# Patient Record
Sex: FEMALE | Race: WHITE | NOT HISPANIC OR LATINO
[De-identification: names, ages, dates, MRNs, and addresses within clinical notes are randomized per-mention and may not be internally consistent; named-entity substitution may affect disease eponyms.]

---

## 2023-10-14 ENCOUNTER — NON-APPOINTMENT (OUTPATIENT)
Age: 83
End: 2023-10-14

## 2023-10-15 ENCOUNTER — INPATIENT (INPATIENT)
Facility: HOSPITAL | Age: 83
LOS: 3 days | Discharge: HOME HEALTH | DRG: 291 | End: 2023-10-19
Attending: HOSPITALIST | Admitting: STUDENT IN AN ORGANIZED HEALTH CARE EDUCATION/TRAINING PROGRAM
Payer: MEDICARE

## 2023-10-15 VITALS
OXYGEN SATURATION: 96 % | TEMPERATURE: 99 F | DIASTOLIC BLOOD PRESSURE: 99 MMHG | RESPIRATION RATE: 19 BRPM | WEIGHT: 160.06 LBS | SYSTOLIC BLOOD PRESSURE: 137 MMHG | HEART RATE: 93 BPM

## 2023-10-15 DIAGNOSIS — R06.02 SHORTNESS OF BREATH: ICD-10-CM

## 2023-10-15 LAB
ALBUMIN SERPL ELPH-MCNC: 4.1 G/DL — SIGNIFICANT CHANGE UP (ref 3.5–5.2)
ALP SERPL-CCNC: 54 U/L — SIGNIFICANT CHANGE UP (ref 30–115)
ALT FLD-CCNC: 29 U/L — SIGNIFICANT CHANGE UP (ref 0–41)
ANION GAP SERPL CALC-SCNC: 13 MMOL/L — SIGNIFICANT CHANGE UP (ref 7–14)
AST SERPL-CCNC: 28 U/L — SIGNIFICANT CHANGE UP (ref 0–41)
BASOPHILS # BLD AUTO: 0.04 K/UL — SIGNIFICANT CHANGE UP (ref 0–0.2)
BASOPHILS NFR BLD AUTO: 0.6 % — SIGNIFICANT CHANGE UP (ref 0–1)
BILIRUB SERPL-MCNC: 0.7 MG/DL — SIGNIFICANT CHANGE UP (ref 0.2–1.2)
BUN SERPL-MCNC: 21 MG/DL — HIGH (ref 10–20)
CALCIUM SERPL-MCNC: 9.1 MG/DL — SIGNIFICANT CHANGE UP (ref 8.4–10.4)
CHLORIDE SERPL-SCNC: 109 MMOL/L — SIGNIFICANT CHANGE UP (ref 98–110)
CO2 SERPL-SCNC: 22 MMOL/L — SIGNIFICANT CHANGE UP (ref 17–32)
CREAT SERPL-MCNC: 0.9 MG/DL — SIGNIFICANT CHANGE UP (ref 0.7–1.5)
D DIMER BLD IA.RAPID-MCNC: 474 NG/ML DDU — HIGH
EGFR: 63 ML/MIN/1.73M2 — SIGNIFICANT CHANGE UP
EOSINOPHIL # BLD AUTO: 0.04 K/UL — SIGNIFICANT CHANGE UP (ref 0–0.7)
EOSINOPHIL NFR BLD AUTO: 0.6 % — SIGNIFICANT CHANGE UP (ref 0–8)
GAS PNL BLDV: SIGNIFICANT CHANGE UP
GLUCOSE SERPL-MCNC: 98 MG/DL — SIGNIFICANT CHANGE UP (ref 70–99)
HCT VFR BLD CALC: 42.1 % — SIGNIFICANT CHANGE UP (ref 37–47)
HGB BLD-MCNC: 13.2 G/DL — SIGNIFICANT CHANGE UP (ref 12–16)
IMM GRANULOCYTES NFR BLD AUTO: 0.3 % — SIGNIFICANT CHANGE UP (ref 0.1–0.3)
LYMPHOCYTES # BLD AUTO: 1.7 K/UL — SIGNIFICANT CHANGE UP (ref 1.2–3.4)
LYMPHOCYTES # BLD AUTO: 23.5 % — SIGNIFICANT CHANGE UP (ref 20.5–51.1)
MAGNESIUM SERPL-MCNC: 2.1 MG/DL — SIGNIFICANT CHANGE UP (ref 1.8–2.4)
MCHC RBC-ENTMCNC: 28.6 PG — SIGNIFICANT CHANGE UP (ref 27–31)
MCHC RBC-ENTMCNC: 31.4 G/DL — LOW (ref 32–37)
MCV RBC AUTO: 91.3 FL — SIGNIFICANT CHANGE UP (ref 81–99)
MONOCYTES # BLD AUTO: 0.41 K/UL — SIGNIFICANT CHANGE UP (ref 0.1–0.6)
MONOCYTES NFR BLD AUTO: 5.7 % — SIGNIFICANT CHANGE UP (ref 1.7–9.3)
NEUTROPHILS # BLD AUTO: 5.03 K/UL — SIGNIFICANT CHANGE UP (ref 1.4–6.5)
NEUTROPHILS NFR BLD AUTO: 69.3 % — SIGNIFICANT CHANGE UP (ref 42.2–75.2)
NRBC # BLD: 0 /100 WBCS — SIGNIFICANT CHANGE UP (ref 0–0)
NT-PROBNP SERPL-SCNC: HIGH PG/ML (ref 0–300)
PLATELET # BLD AUTO: 266 K/UL — SIGNIFICANT CHANGE UP (ref 130–400)
PMV BLD: 9.4 FL — SIGNIFICANT CHANGE UP (ref 7.4–10.4)
POTASSIUM SERPL-MCNC: 5.1 MMOL/L — HIGH (ref 3.5–5)
POTASSIUM SERPL-SCNC: 5.1 MMOL/L — HIGH (ref 3.5–5)
PROT SERPL-MCNC: 6.7 G/DL — SIGNIFICANT CHANGE UP (ref 6–8)
RBC # BLD: 4.61 M/UL — SIGNIFICANT CHANGE UP (ref 4.2–5.4)
RBC # FLD: 14.2 % — SIGNIFICANT CHANGE UP (ref 11.5–14.5)
SODIUM SERPL-SCNC: 144 MMOL/L — SIGNIFICANT CHANGE UP (ref 135–146)
TROPONIN T SERPL-MCNC: 0.02 NG/ML — HIGH
WBC # BLD: 7.24 K/UL — SIGNIFICANT CHANGE UP (ref 4.8–10.8)
WBC # FLD AUTO: 7.24 K/UL — SIGNIFICANT CHANGE UP (ref 4.8–10.8)

## 2023-10-15 PROCEDURE — 85025 COMPLETE CBC W/AUTO DIFF WBC: CPT

## 2023-10-15 PROCEDURE — 97166 OT EVAL MOD COMPLEX 45 MIN: CPT | Mod: GO

## 2023-10-15 PROCEDURE — 71045 X-RAY EXAM CHEST 1 VIEW: CPT | Mod: 26

## 2023-10-15 PROCEDURE — 97162 PT EVAL MOD COMPLEX 30 MIN: CPT | Mod: GP

## 2023-10-15 PROCEDURE — 97116 GAIT TRAINING THERAPY: CPT | Mod: GP

## 2023-10-15 PROCEDURE — 36415 COLL VENOUS BLD VENIPUNCTURE: CPT

## 2023-10-15 PROCEDURE — 97110 THERAPEUTIC EXERCISES: CPT | Mod: GP

## 2023-10-15 PROCEDURE — 80053 COMPREHEN METABOLIC PANEL: CPT

## 2023-10-15 PROCEDURE — 93970 EXTREMITY STUDY: CPT | Mod: 26

## 2023-10-15 PROCEDURE — 80061 LIPID PANEL: CPT

## 2023-10-15 PROCEDURE — 99223 1ST HOSP IP/OBS HIGH 75: CPT

## 2023-10-15 PROCEDURE — 93005 ELECTROCARDIOGRAM TRACING: CPT

## 2023-10-15 PROCEDURE — 99285 EMERGENCY DEPT VISIT HI MDM: CPT

## 2023-10-15 PROCEDURE — 84436 ASSAY OF TOTAL THYROXINE: CPT

## 2023-10-15 PROCEDURE — 72125 CT NECK SPINE W/O DYE: CPT | Mod: 26,MA

## 2023-10-15 PROCEDURE — 82962 GLUCOSE BLOOD TEST: CPT

## 2023-10-15 PROCEDURE — 70450 CT HEAD/BRAIN W/O DYE: CPT | Mod: 26,MA

## 2023-10-15 PROCEDURE — 73620 X-RAY EXAM OF FOOT: CPT | Mod: 26,LT

## 2023-10-15 PROCEDURE — 83735 ASSAY OF MAGNESIUM: CPT

## 2023-10-15 PROCEDURE — 84443 ASSAY THYROID STIM HORMONE: CPT

## 2023-10-15 PROCEDURE — 83880 ASSAY OF NATRIURETIC PEPTIDE: CPT

## 2023-10-15 PROCEDURE — 85027 COMPLETE CBC AUTOMATED: CPT

## 2023-10-15 PROCEDURE — 80048 BASIC METABOLIC PNL TOTAL CA: CPT

## 2023-10-15 PROCEDURE — 83036 HEMOGLOBIN GLYCOSYLATED A1C: CPT

## 2023-10-15 PROCEDURE — 73600 X-RAY EXAM OF ANKLE: CPT | Mod: 26,LT

## 2023-10-15 PROCEDURE — 71045 X-RAY EXAM CHEST 1 VIEW: CPT

## 2023-10-15 PROCEDURE — 70486 CT MAXILLOFACIAL W/O DYE: CPT | Mod: 26,MA

## 2023-10-15 PROCEDURE — 71275 CT ANGIOGRAPHY CHEST: CPT | Mod: 26,MA

## 2023-10-15 PROCEDURE — 73590 X-RAY EXAM OF LOWER LEG: CPT | Mod: 26,LT

## 2023-10-15 PROCEDURE — 84484 ASSAY OF TROPONIN QUANT: CPT

## 2023-10-15 RX ORDER — DIAZEPAM 5 MG
5 TABLET ORAL AT BEDTIME
Refills: 0 | Status: DISCONTINUED | OUTPATIENT
Start: 2023-10-15 | End: 2023-10-19

## 2023-10-15 RX ORDER — CARVEDILOL PHOSPHATE 80 MG/1
6.25 CAPSULE, EXTENDED RELEASE ORAL EVERY 12 HOURS
Refills: 0 | Status: DISCONTINUED | OUTPATIENT
Start: 2023-10-15 | End: 2023-10-19

## 2023-10-15 RX ORDER — ENOXAPARIN SODIUM 100 MG/ML
40 INJECTION SUBCUTANEOUS EVERY 24 HOURS
Refills: 0 | Status: DISCONTINUED | OUTPATIENT
Start: 2023-10-15 | End: 2023-10-19

## 2023-10-15 RX ORDER — FUROSEMIDE 40 MG
40 TABLET ORAL ONCE
Refills: 0 | Status: COMPLETED | OUTPATIENT
Start: 2023-10-15 | End: 2023-10-15

## 2023-10-15 RX ORDER — DEXAMETHASONE 0.5 MG/5ML
6 ELIXIR ORAL ONCE
Refills: 0 | Status: COMPLETED | OUTPATIENT
Start: 2023-10-15 | End: 2023-10-15

## 2023-10-15 RX ORDER — ONDANSETRON 8 MG/1
4 TABLET, FILM COATED ORAL EVERY 8 HOURS
Refills: 0 | Status: DISCONTINUED | OUTPATIENT
Start: 2023-10-15 | End: 2023-10-19

## 2023-10-15 RX ORDER — ATORVASTATIN CALCIUM 80 MG/1
40 TABLET, FILM COATED ORAL AT BEDTIME
Refills: 0 | Status: DISCONTINUED | OUTPATIENT
Start: 2023-10-15 | End: 2023-10-19

## 2023-10-15 RX ORDER — ACETAMINOPHEN 500 MG
650 TABLET ORAL EVERY 6 HOURS
Refills: 0 | Status: DISCONTINUED | OUTPATIENT
Start: 2023-10-15 | End: 2023-10-19

## 2023-10-15 RX ORDER — IPRATROPIUM/ALBUTEROL SULFATE 18-103MCG
3 AEROSOL WITH ADAPTER (GRAM) INHALATION ONCE
Refills: 0 | Status: COMPLETED | OUTPATIENT
Start: 2023-10-15 | End: 2023-10-15

## 2023-10-15 RX ORDER — FUROSEMIDE 40 MG
40 TABLET ORAL EVERY 12 HOURS
Refills: 0 | Status: DISCONTINUED | OUTPATIENT
Start: 2023-10-16 | End: 2023-10-16

## 2023-10-15 RX ADMIN — Medication 40 MILLIGRAM(S): at 14:51

## 2023-10-15 RX ADMIN — Medication 6 MILLIGRAM(S): at 14:51

## 2023-10-15 RX ADMIN — Medication 40 MILLIGRAM(S): at 18:04

## 2023-10-15 RX ADMIN — Medication 3 MILLILITER(S): at 14:51

## 2023-10-15 NOTE — H&P ADULT - HISTORY OF PRESENT ILLNESS
83F w/ PMHx HTN, HLD, and CHF presents to the ED with shortness of breath. Patient states that she has been visiting her family in [place] since Wednesday, but forgot all of her medications at her home in [place]. She has been without her carvedilol, lisinopril, and Lasix 40mg which she takes daily. She went to an urgent care this morning was noted to be short of breath and hypoxic in the mid 80s without any oxygen.  Paramedics noted that she was in paroxysmal A-fib on the EKG and brought her to the ED. At this time she remains with shortness of breath on minimal exertion however 100% O2 on 2L NC. She admits to some lightheadedness, dry cough and leg swelling but denies palpitations, chest pain, fever, chills, sore throat, abdominal pain, nausea, vomiting, dysuria. Of note 2 weeks ago she experienced a mechanical trip and fall with head strike and has had some pain around her nose and left leg since. Patient is not on AC.    Vitals:    Labs:    Imaging:    Admitted to medicine for further management 83F w/ PMHx HTN, HLD, and CHF presents to the ED with shortness of breath. Patient states that she has been visiting her family in [place] since Wednesday, but forgot all of her medications at her home in [place]. She has been without her carvedilol, lisinopril, and Lasix 40mg which she takes daily. She went to an urgent care this morning was noted to be short of breath and hypoxic in the mid 80s without any oxygen.  Paramedics noted that she was in paroxysmal A-fib on the EKG and brought her to the ED. At this time she remains with shortness of breath on minimal exertion however 100% O2 on 2L NC. She admits to some lightheadedness, dry cough and leg swelling but denies palpitations, chest pain, fever, chills, sore throat, abdominal pain, nausea, vomiting, dysuria. Of note 2 weeks ago she experienced a mechanical trip and fall with head strike and has had some pain around her nose and left leg since. Patient is not on AC.    Vitals: Temp 99F, /99, HR 93, RR 19, SpO2 96% on 3L NC    Labs: K 5.1, Trop 0.02, BNP 51665 (unknown baseline), D-dimer 474    Imaging:  - CT Head/Maxillofacial/Neck: Bilateral nasal bone fractures are noted with displacement and deviation to the right, otherwise no other acute intracranial pathology.  - CTA Chest: No PE noted. Small bilateral pleural effusions with compressive atelectasis. No consolidations. Reflux of contrast into the inferior vena cava and hepatic veins. Small bilateral pleural effusions and mild mosaic lung attenuation. Findings are compatible with right heart failure.    EKG showed sinus rhythm w/ PACs and PVCs    In the ED:  - s/p Lasix 40mg IV x2  - Duonebs x1  - Decadron 6mg IV x1    Admitted to medicine/telemetry for further management 83F w/ PMHx CHF, HTN, HLD and remote smoking hx presents to the ED with shortness of breath. Patient is visiting family from [place] and forgot her medications for the past few days. Prior to the ED, she was at urgent care where she was found to be hypoxic in the mid 80s on RA. Patient is not on home O2. She admits to some lightheadedness, dry cough, leg swelling, left leg pain and nose pain, but denies palpitations, chest pain, headaches, fever, chills, sore throat, abdominal pain, nausea, vomiting, diarrhea or dysuria. Of note, patient's daughter passed away 2 weeks ago and is undergoing a lot of emotional stress. She also had a mechanical fall a few days ago with head and LLE trauma.    Vitals: Temp 99F, /99, HR 93, RR 19, SpO2 96% on 3L NC    Labs: K 5.1, Trop 0.02, BNP 79522 (unknown baseline), D-dimer 474    Imaging:  - CT Head/Maxillofacial/Neck: Bilateral nasal bone fractures are noted with displacement and deviation to the right, otherwise no other acute intracranial pathology.  - CTA Chest: No PE noted. Small bilateral pleural effusions with compressive atelectasis. No consolidations. Reflux of contrast into the inferior vena cava and hepatic veins. Small bilateral pleural effusions and mild mosaic lung attenuation. Findings are compatible with right heart failure.    EKG showed sinus rhythm w/ PACs and PVCs    In the ED:  - s/p Lasix 40mg IV x2  - Duonebs x1  - Decadron 6mg IV x1    Admitted to medicine/telemetry for further management

## 2023-10-15 NOTE — H&P ADULT - NSHPPHYSICALEXAM_GEN_ALL_CORE
VITALS:   Vital Signs Last 24 Hrs  T(C): 36.6 (15 Oct 2023 15:59), Max: 37.2 (15 Oct 2023 11:19)  T(F): 97.9 (15 Oct 2023 15:59), Max: 99 (15 Oct 2023 11:19)  HR: 63 (15 Oct 2023 15:59) (63 - 93)  BP: 127/92 (15 Oct 2023 18:02) (127/92 - 137/99)  RR: 19 (15 Oct 2023 11:19) (19 - 19)  SpO2: 99% (15 Oct 2023 15:59) (96% - 99%)    Parameters below as of 15 Oct 2023 15:59  Patient On (Oxygen Delivery Method): nasal cannula      I&O's Summary    CAPILLARY BLOOD GLUCOSE          PHYSICAL EXAM:  General: WN/WD NAD  HEENT: PERRLA, EOMI, moist mucous membranes  Neurology: A&Ox3, nonfocal, KING x 4  Respiratory: CTA B/L, normal respiratory effort, no wheezes, crackles, rales  CV: RRR, S1S2, no murmurs, rubs or gallops  Abdominal: Soft, NT, ND +BS, Last BM  Extremities: No edema, + peripheral pulses VITALS:   Vital Signs Last 24 Hrs  T(C): 36.6 (15 Oct 2023 15:59), Max: 37.2 (15 Oct 2023 11:19)  T(F): 97.9 (15 Oct 2023 15:59), Max: 99 (15 Oct 2023 11:19)  HR: 63 (15 Oct 2023 15:59) (63 - 93)  BP: 127/92 (15 Oct 2023 18:02) (127/92 - 137/99)  RR: 19 (15 Oct 2023 11:19) (19 - 19)  SpO2: 99% (15 Oct 2023 15:59) (96% - 99%)    Parameters below as of 15 Oct 2023 15:59  Patient On (Oxygen Delivery Method): nasal cannula      I&O's Summary    CAPILLARY BLOOD GLUCOSE          PHYSICAL EXAM:  General: in NAD  HEENT: PERRLA, EOMI, moist mucous membranes, tenderness to palpation w/ slight deviation of nasal bone  Neurology: A&Ox3  Respiratory: CTA B/L, normal respiratory effort, no wheezes, crackles, rales  CV: tachycardic, irregular pulse, +S3  Abdominal: Soft, NT, ND, +BS, no guarding or rebound tenderness  Extremities: +1 edema in b/l LEs, + peripheral pulses, slight bruising of LLE

## 2023-10-15 NOTE — ED PROVIDER NOTE - CLINICAL SUMMARY MEDICAL DECISION MAKING FREE TEXT BOX
83-year-old female past medical history of hypertension hyperlipidemia CHF presents with multiple complaints.  Sent from urgent care for hypoxia, [facility] told him that patient had new onset A-fib in route to ED.  Patient reports 1 week of chest tightness shortness of breath generalized weakness dry cough.  Patient states she missed the last 5 days of Lasix because she was staying with her daughter.  Not on home O2.  No fever.  Has leg swelling associated.  Patient states she fell 2 weeks ago missed that step and hit her face.  No LOC or blood thinners.  States her nose was broken before and looks more crooked now.  No numbness weakness blurry vision altered mental status slurred speech.  No abdominal pain nausea vomiting diarrhea.  No other injuries from fall.  No neck pain back pain.    On exam, AFVSS, Well appearing, nasal septum deviation, no septal hematoma, abrasions to face healed no sign of infection, no acute distress, NCAT, EOMI, PERRLA, MMM, Neck supple, bibasilar crackles, RRR nl s1s2 No mrg, Abdomen Soft NTND, AAOx3, No Focal Deficits, 1+ bilateral pitting LE edema , no calf TTP,    A/P; mechanical fall 2 weeks ago head injury we will get CT head rule out fractures, intracranial bleed, shortness of breath chest pain appears overloaded, rule out ACS, rule out PE, rule out pneumonia, will do labs EKG chest x-ray troponin BNP D-dimer continue O2 nasal cannula, Lasix as needed, needs admission as patient is hypoxic

## 2023-10-15 NOTE — ED PROVIDER NOTE - ATTENDING APP SHARED VISIT CONTRIBUTION OF CARE
83-year-old female past medical history of hypertension hyperlipidemia CHF presents with multiple complaints.  Sent from urgent care for hypoxia, [facility] told him that patient had new onset A-fib in route to ED.  Patient reports 1 week of chest tightness shortness of breath generalized weakness dry cough.  Patient states she missed the last 5 days of Lasix because she was staying with her daughter.  Not on home O2.  No fever.  Has leg swelling associated.  Patient states she fell 2 weeks ago missed that step and hit her face.  No LOC or blood thinners.  States her nose was broken before and looks more crooked now.  No numbness weakness blurry vision altered mental status slurred speech.  No abdominal pain nausea vomiting diarrhea.  No other injuries from fall.  No neck pain back pain.    On exam, AFVSS, Well appearing, nasal septum deviation, no septal hematoma, abrasions to face healed no sign of infection, no acute distress, NCAT, EOMI, PERRLA, MMM, Neck supple, bibasilar crackles, RRR nl s1s2 No mrg, Abdomen Soft NTND, AAOx3, No Focal Deficits, 1+ bilateral pitting LE edema , no calf TTP,    A/P; mechanical fall 2 weeks ago head injury we will get CT head rule out fractures, intracranial bleed, shortness of breath chest pain appears overloaded, rule out ACS, rule out PE, rule out pneumonia, will do labs EKG chest x-ray troponin BNP D-dimer continue O2 nasal cannula, Lasix as needed, needs admission as patient is hypoxic

## 2023-10-15 NOTE — H&P ADULT - NSHPLABSRESULTS_GEN_ALL_CORE
LABS:  cret                        13.2   7.24  )-----------( 266      ( 15 Oct 2023 14:35 )             42.1     10-15    144  |  109  |  21<H>  ----------------------------<  98  5.1<H>   |  22  |  0.9    Ca    9.1      15 Oct 2023 14:35  Mg     2.1     10-15    TPro  6.7  /  Alb  4.1  /  TBili  0.7  /  DBili  x   /  AST  28  /  ALT  29  /  AlkPhos  54  10-15

## 2023-10-15 NOTE — H&P ADULT - ASSESSMENT
83F w/ PMHx HTN, HLD, and CHF presents to the ED with shortness of breath.    #CHF    #HTN    #HLD    #DVT ppx:  #GI ppx:  #Diet:  #Activity:  #Code Status:  #Dispo: 83F w/ PMHx HTN, HLD, and CHF presents to the ED with shortness of breath.    #CHF  - CTA Chest shows small b/l pleural effusions, evidence of right heart failure, no consolidations  - CXR shows vascular congestion and small b/l effusions  - EKG in ED showed Sinus Rhythm w/ PVC and PACs (according to EMS, their EKG showed AFib, pt has no hx of Afib)  - satting 96% on 3L NC  - BNP 95537 (unknown baseline)  - Trop elevated at 0.02  - takes Lasix 40mg PO qD at home, has not taken meds for the last few days  - s/p Lasix 40mg IV x2 in the ED  - trend trop until stable or downtrending  - f/u TTE  - f/u Cardio consult  - monitor tele    #Hyperkalemia  - K 5.1 on admission  -     #Nasal Bone Fractures s/p Mechanical Fall  - CT Maxillofacial shows bilateral nasal bone fractures are noted with displacement and deviation to the right  - CT Head -ve for acute intracranial pathology    #Left Leg Trauma s/p Mechanical Fall  - f/u Xray of left foot, ankle and tib/fib    #HTN  -     #HLD  -     #DVT ppx:  #GI ppx:  #Diet:  #Activity:  #Code Status:  #Dispo: from home 83F w/ PMHx HTN, HLD, and CHF presents to the ED with shortness of breath.    #Acute on Chronic CHF Exacerbation  - CTA Chest shows small b/l pleural effusions, evidence of right heart failure, no consolidations  - CXR shows vascular congestion and small b/l effusions  - EKG in ED showed Sinus Rhythm w/ PVC and PACs  - satting 96% on 3L NC  - BNP 15679 (unknown baseline)  - Trop elevated at 0.02  - takes Lasix 40mg PO qD at home, has not taken meds for the last few days  - s/p Lasix 40mg IV x2 in the ED  - c/w Lasix 40mg IV BID for now  - trend trop until stable or downtrending  - f/u TSH, A1C  - f/u TTE  - f/u Cardio consult  - strict I&Os, daily weight, 1.5 fluid restriction  - monitor Cr as pt is being actively diuresed and received IV contrast  - monitor tele    #Hyperkalemia  - K 5.1 on admission  - actively diuresing  - monitor lytes and correct PRN    #Nasal Bone Fractures s/p Mechanical Fall  #h/o Facial Trauma s/p Mechanical Fall (~2019)  - CT Maxillofacial shows bilateral nasal bone fractures are noted with displacement and deviation to the right  - CT Head -ve for acute intracranial pathology  - f/u ENT consult    #Left Leg Trauma s/p Mechanical Fall  - f/u Xray of left foot, ankle and tib/fib    #HTN  - holding home Lisinopril 10mg daily d/t hyperkalemia  - monitor BP  - resume home BP meds when appropriate    #HLD  - c/w home Atorvastatin 40mg daily  - f/u lipid panel    #DVT ppx: Heparin SubQ  #GI ppx: n/a  #Diet: regular - DASH/TLC w/ 1.5L fluid restriction  #Activity: IAT, fall risk  #Code Status: Full Code  #Dispo: from home 83F w/ PMHx HTN, HLD, and CHF presents to the ED with shortness of breath.    #Acute on Chronic CHF Exacerbation  - CTA Chest shows small b/l pleural effusions, evidence of right heart failure, no consolidations  - CXR shows vascular congestion and small b/l effusions  - EKG in ED showed Sinus Rhythm w/ PVC and PACs  - satting 96% on 3L NC  - BNP 07329 (unknown baseline)  - Trop elevated at 0.02  - takes Lasix 40mg PO qD at home, has not taken meds for the last few days  - s/p Lasix 40mg IV x2 in the ED  - c/w Lasix 40mg IV BID for now  - trend trop until stable or downtrending  - f/u TSH, A1C  - f/u TTE  - f/u Cardio consult  - strict I&Os, daily weight, 1.5 fluid restriction  - monitor Cr as pt is being actively diuresed and received IV contrast  - monitor tele    #Hyperkalemia  - K 5.1 on admission  - actively diuresing  - monitor lytes and correct PRN    #Nasal Bone Fractures s/p Mechanical Fall  #h/o Facial Trauma s/p Mechanical Fall (~2019)  - CT Maxillofacial shows bilateral nasal bone fractures are noted with displacement and deviation to the right  - CT Head -ve for acute intracranial pathology  - f/u ENT consult    #Left Leg Trauma s/p Mechanical Fall  - f/u Xray of left foot, ankle and tib/fib    #HTN  - holding home Lisinopril 10mg daily d/t hyperkalemia  - monitor BP  - resume home BP meds when appropriate    #HLD  - c/w home Atorvastatin 40mg daily  - f/u lipid panel    #DVT ppx: Lovenox SubQ  #GI ppx: n/a  #Diet: regular - DASH/TLC w/ 1.5L fluid restriction  #Activity: IAT, fall risk  #Code Status: Full Code  #Dispo: from home 83F w/ PMHx HTN, HLD, and CHF presents to the ED with shortness of breath.    #Acute on Chronic CHF Exacerbation  - CTA Chest shows small b/l pleural effusions, evidence of right heart failure, no consolidations  - CXR shows vascular congestion and small b/l effusions  - EKG in ED showed Sinus Rhythm w/ PVC and PACs  - satting 96% on 3L NC  - BNP 02993 (unknown baseline)  - Trop elevated at 0.02  - takes Lasix 40mg PO qD at home, has not taken meds for the last few days  - s/p Lasix 40mg IV x2 in the ED  - c/w Lasix 40mg IV BID for now  - trend trop until stable or downtrending  - f/u TSH, A1C  - f/u TTE  - f/u Cardio consult  - strict I&Os, daily weight, 1.5 fluid restriction  - monitor Cr as pt is being actively diuresed and received IV contrast  - monitor tele    #Hyperkalemia  - K 5.1 on admission  - actively diuresing  - monitor lytes and correct PRN    #Nasal Bone Fractures s/p Mechanical Fall  #h/o Facial Trauma s/p Mechanical Fall (~2019)  - CT Maxillofacial shows bilateral nasal bone fractures are noted with displacement and deviation to the right  - CT Head -ve for acute intracranial pathology  - f/u ENT consult    #Left Leg Trauma s/p Mechanical Fall  - f/u Xray of left foot, ankle and tib/fib    #HTN  - holding home Lisinopril 10mg daily d/t hyperkalemia  - monitor BP  - resume home BP meds when appropriate    #HLD  - c/w home Atorvastatin 40mg daily  - f/u lipid panel    #DVT ppx: Lovenox SubQ  #GI ppx: n/a  #Diet: regular - DASH/TLC w/ 1.5L fluid restriction  #Activity: IAT, fall risk - f/u PT/OT consult  #Code Status: Full Code  #Dispo: from home

## 2023-10-15 NOTE — ED PROVIDER NOTE - OBJECTIVE STATEMENT
83 year old female with a history of 83 year old female with a history of HTN, HLD, and CHF presents to the ED with shortness of breath. Patient states that she has been visiting her family in [place] since Wednesday but forgot all of her medications at her home in [place].  She has been without her carvedilol, lisinopril, and Lasix 40 mg which she takes daily.  She went to an urgent care this morning was noted to be short of breath and hypoxic in the mid 80s without any oxygen.  Paramedics noted that she was in paroxysmal A-fib on the EKG and brought her to the ED.  At this time she remains with shortness of breath on minimal exertion however 100% O2 on 2L NC.  She admits to some lightheadedness, dry cough and leg swelling but denies palpitations, chest pain, fever, chills, sore throat, abdominal pain, nausea, vomiting, dysuria.  Of note 2 weeks ago she experienced a mechanical trip and fall with head strike and has had some pain around her nose and left leg since. Patient is not on AC.

## 2023-10-15 NOTE — ED PROVIDER NOTE - CARE PLAN
Principal Discharge DX:	Acute CHF  Secondary Diagnosis:	Shortness of breath  Secondary Diagnosis:	Cardiac arrhythmia   1

## 2023-10-15 NOTE — ED PROVIDER NOTE - PHYSICAL EXAMINATION
Physical Exam    Constitutional: No acute distress.   Eyes: Conjunctiva pink, Sclera clear, PERRLA, EOMI.  ENT: Nasal bone tenderness with some deviation. No nasal discharge. No oropharyngeal erythema, edema, or exudates. Uvula midline.   Cardiovascular: irregular rhythm, regular rate. No noted murmur.   Respiratory: unlabored respiratory effort, 2L NC. Bilateral crackles  Gastrointestinal: Normal bowel sounds. soft, non distended, non-tender abdomen.   Musculoskeletal: supple neck, no midline tenderness. No joint or bony deformity. Bilateral lower extremity edema  Integumentary: warm, dry, no rash  Neurologic: awake, alert, cranial nerves II-XII grossly intact, extremities’ motor and sensory functions grossly intact  Psychiatric: appropriate mood, appropriate affect

## 2023-10-15 NOTE — H&P ADULT - ATTENDING COMMENTS
Patient seen and examined at bedside independently of the residents. I read the resident's note and agree with the plan with the additions and corrections as noted below. My note supersedes the resident's note.     REVIEW OF SYSTEMS:  CONSTITUTIONAL: No weakness, fevers or chills  EYES/ENT: No visual changes;  No vertigo or throat pain   NECK: No pain or stiffness  RESPIRATORY: No cough, wheezing, hemoptysis; + shortness of breath  CARDIOVASCULAR: No chest pain or palpitations  GASTROINTESTINAL: No abdominal or epigastric pain. No nausea, vomiting, or hematemesis; No diarrhea or constipation. No melena or hematochezia.  GENITOURINARY: No dysuria, frequency or hematuria  NEUROLOGICAL: No numbness or weakness  MSK: No pain. No weakness.   SKIN: No itching, rashes.     PMH: HTN, HLD, CHF     FHx: Reviewed. No fhx of asthma/copd, No fhx of liver and pulmonary disease. No fhx of hematological disorder.     Physical Exam:  GEN: No acute distress. Awake, Alert and oriented x 3.   Head: Atraumatic, Normocephalic.   Eye: PEERLA. No sclera icterus. EOMI.   ENT: Normal oropharynx, no thyromegaly, no mass, no lymphadenopathy.   LUNGS: Crackles b/l lung fields.   HEART: Normal. S1/S2 present. RRR. No murmur/gallops.   ABD: Soft, non-tender, non-distended. Bowel sounds present.   EXT: 1+ pitting edema B/L LE . No erythema. No tenderness.  Integumentary: No rash, No sore, No petechia.   NEURO: CN III-XII intact. Strength: 5/5 b/l ULE. Sensory intact b/l ULE.     Vital Signs Last 24 Hrs  T(C): 36.6 (15 Oct 2023 15:59), Max: 37.2 (15 Oct 2023 11:19)  T(F): 97.9 (15 Oct 2023 15:59), Max: 99 (15 Oct 2023 11:19)  HR: 63 (15 Oct 2023 15:59) (63 - 93)  BP: 127/92 (15 Oct 2023 18:02) (127/92 - 137/99)  BP(mean): --  RR: 19 (15 Oct 2023 11:19) (19 - 19)  SpO2: 99% (15 Oct 2023 15:59) (96% - 99%)    Parameters below as of 15 Oct 2023 15:59  Patient On (Oxygen Delivery Method): nasal cannula      Please see the above notes for Labs and radiology.     Assessment and Plan:     82 yo F with hx of HTN, HLD, CHF presents to ED for SOB.     Acute hypoxic respiratory failure likely 2/2 acute CHF exacerbation 2/2 medication non-compliance   - CTA chest: neg for acute PE. Reflux of contrast into the inferior vena cava and hepatic veins. Small bilateral pleural effusions and mild mosaic lung attenuation. - CXR shows b/l pulmonary vascular congestion to my read --> pending official report.   - EKG: Sinus rhythm with PVC and PAC. Prolong QTc 487.   - proBNP 90032  - 1st troponin 0.02 --> trend troponin.   - s/p IV lasix 40mg x 2 in ED.   - c/w IV lasix 40mg BID (pt takes PO lasix 4mg qd at home)   - check TTE and TSH  - monitor daily weight, strict I & O, Low Na diet with fluid restriction  - monitor on Telemetry.     HTN/HLD - c/w home med    DVT ppx: Lovenox SC  GI ppx: not indicated.   Diet: DASH diet with fluid restriction  Activity: as tolerated.     Date seen by the attending: 10/15/2023  Total time spent: 75 minutes. Patient seen and examined at bedside independently of the residents. I read the resident's note and agree with the plan with the additions and corrections as noted below. My note supersedes the resident's note.     REVIEW OF SYSTEMS:  CONSTITUTIONAL: No weakness, fevers or chills  EYES/ENT: No visual changes;  No vertigo or throat pain   NECK: No pain or stiffness  RESPIRATORY: No cough, wheezing, hemoptysis; + shortness of breath  CARDIOVASCULAR: No chest pain or palpitations  GASTROINTESTINAL: No abdominal or epigastric pain. No nausea, vomiting, or hematemesis; No diarrhea or constipation. No melena or hematochezia.  GENITOURINARY: No dysuria, frequency or hematuria  NEUROLOGICAL: No numbness or weakness  MSK: No pain. No weakness.   SKIN: No itching, rashes.     PMH: HTN, HLD, CHF     FHx: Reviewed. No fhx of asthma/copd, No fhx of liver and pulmonary disease. No fhx of hematological disorder.     Physical Exam:  GEN: No acute distress. Awake, Alert and oriented x 3.   Head: Atraumatic, Normocephalic.   Eye: PEERLA. No sclera icterus. EOMI.   ENT: Normal oropharynx, no thyromegaly, no mass, no lymphadenopathy.   LUNGS: Crackles b/l lung fields.   HEART: Normal. S1/S2 present. RRR. No murmur/gallops.   ABD: Soft, non-tender, non-distended. Bowel sounds present.   EXT: 1+ pitting edema B/L LE . No erythema. No tenderness.  Integumentary: No rash, No sore, No petechia.   NEURO: CN III-XII intact. Strength: 5/5 b/l ULE. Sensory intact b/l ULE.     Vital Signs Last 24 Hrs  T(C): 36.6 (15 Oct 2023 15:59), Max: 37.2 (15 Oct 2023 11:19)  T(F): 97.9 (15 Oct 2023 15:59), Max: 99 (15 Oct 2023 11:19)  HR: 63 (15 Oct 2023 15:59) (63 - 93)  BP: 127/92 (15 Oct 2023 18:02) (127/92 - 137/99)  BP(mean): --  RR: 19 (15 Oct 2023 11:19) (19 - 19)  SpO2: 99% (15 Oct 2023 15:59) (96% - 99%)    Parameters below as of 15 Oct 2023 15:59  Patient On (Oxygen Delivery Method): nasal cannula      Please see the above notes for Labs and radiology.     Assessment and Plan:     82 yo F with hx of HTN, HLD, CHF presents to ED for SOB.     Acute hypoxic respiratory failure likely 2/2 acute CHF exacerbation 2/2 medication non-compliance   - CTA chest: neg for acute PE. Reflux of contrast into the inferior vena cava and hepatic veins. Small bilateral pleural effusions and mild mosaic lung attenuation. - CXR shows b/l pulmonary vascular congestion to my read --> pending official report.   - EKG: Sinus rhythm with PVC and PAC. Prolong QTc 487.   - proBNP 46655  - 1st troponin 0.02 --> trend troponin.   - s/p IV lasix 40mg x 2 in ED.   - c/w IV lasix 40mg BID (pt takes PO lasix 4mg qd at home)   - check TTE and TSH  - monitor daily weight, strict I & O, Low Na diet with fluid restriction  - monitor on Telemetry.     Mild hyperkalemia   - c/w IV lasix   - repeat BMP     Nasal bone fracture 2/2 mechanical fall   - pain control prn  - ENT consult.     HTN/HLD - c/w home med    DVT ppx: Lovenox SC  GI ppx: not indicated.   Diet: DASH diet with fluid restriction  Activity: as tolerated.     Date seen by the attending: 10/15/2023  Total time spent: 75 minutes.

## 2023-10-16 LAB
A1C WITH ESTIMATED AVERAGE GLUCOSE RESULT: 5.8 % — HIGH (ref 4–5.6)
ALBUMIN SERPL ELPH-MCNC: 4.2 G/DL — SIGNIFICANT CHANGE UP (ref 3.5–5.2)
ALP SERPL-CCNC: 50 U/L — SIGNIFICANT CHANGE UP (ref 30–115)
ALT FLD-CCNC: 24 U/L — SIGNIFICANT CHANGE UP (ref 0–41)
ANION GAP SERPL CALC-SCNC: 15 MMOL/L — HIGH (ref 7–14)
AST SERPL-CCNC: 20 U/L — SIGNIFICANT CHANGE UP (ref 0–41)
BASOPHILS # BLD AUTO: 0.05 K/UL — SIGNIFICANT CHANGE UP (ref 0–0.2)
BASOPHILS NFR BLD AUTO: 0.7 % — SIGNIFICANT CHANGE UP (ref 0–1)
BILIRUB SERPL-MCNC: 1.1 MG/DL — SIGNIFICANT CHANGE UP (ref 0.2–1.2)
BUN SERPL-MCNC: 21 MG/DL — HIGH (ref 10–20)
CALCIUM SERPL-MCNC: 8.7 MG/DL — SIGNIFICANT CHANGE UP (ref 8.4–10.5)
CHLORIDE SERPL-SCNC: 100 MMOL/L — SIGNIFICANT CHANGE UP (ref 98–110)
CHOLEST SERPL-MCNC: 223 MG/DL — HIGH
CO2 SERPL-SCNC: 27 MMOL/L — SIGNIFICANT CHANGE UP (ref 17–32)
CREAT SERPL-MCNC: 1.1 MG/DL — SIGNIFICANT CHANGE UP (ref 0.7–1.5)
EGFR: 49 ML/MIN/1.73M2 — LOW
EOSINOPHIL # BLD AUTO: 0.1 K/UL — SIGNIFICANT CHANGE UP (ref 0–0.7)
EOSINOPHIL NFR BLD AUTO: 1.4 % — SIGNIFICANT CHANGE UP (ref 0–8)
ESTIMATED AVERAGE GLUCOSE: 120 MG/DL — HIGH (ref 68–114)
GLUCOSE BLDC GLUCOMTR-MCNC: 102 MG/DL — HIGH (ref 70–99)
GLUCOSE BLDC GLUCOMTR-MCNC: 102 MG/DL — HIGH (ref 70–99)
GLUCOSE BLDC GLUCOMTR-MCNC: 103 MG/DL — HIGH (ref 70–99)
GLUCOSE BLDC GLUCOMTR-MCNC: 103 MG/DL — HIGH (ref 70–99)
GLUCOSE SERPL-MCNC: 86 MG/DL — SIGNIFICANT CHANGE UP (ref 70–99)
HCT VFR BLD CALC: 42.1 % — SIGNIFICANT CHANGE UP (ref 37–47)
HDLC SERPL-MCNC: 66 MG/DL — SIGNIFICANT CHANGE UP
HGB BLD-MCNC: 13.2 G/DL — SIGNIFICANT CHANGE UP (ref 12–16)
IMM GRANULOCYTES NFR BLD AUTO: 0.4 % — HIGH (ref 0.1–0.3)
LIPID PNL WITH DIRECT LDL SERPL: 137 MG/DL — HIGH
LYMPHOCYTES # BLD AUTO: 1.79 K/UL — SIGNIFICANT CHANGE UP (ref 1.2–3.4)
LYMPHOCYTES # BLD AUTO: 25.9 % — SIGNIFICANT CHANGE UP (ref 20.5–51.1)
MAGNESIUM SERPL-MCNC: 1.9 MG/DL — SIGNIFICANT CHANGE UP (ref 1.8–2.4)
MCHC RBC-ENTMCNC: 28.7 PG — SIGNIFICANT CHANGE UP (ref 27–31)
MCHC RBC-ENTMCNC: 31.4 G/DL — LOW (ref 32–37)
MCV RBC AUTO: 91.5 FL — SIGNIFICANT CHANGE UP (ref 81–99)
MONOCYTES # BLD AUTO: 0.52 K/UL — SIGNIFICANT CHANGE UP (ref 0.1–0.6)
MONOCYTES NFR BLD AUTO: 7.5 % — SIGNIFICANT CHANGE UP (ref 1.7–9.3)
NEUTROPHILS # BLD AUTO: 4.43 K/UL — SIGNIFICANT CHANGE UP (ref 1.4–6.5)
NEUTROPHILS NFR BLD AUTO: 64.1 % — SIGNIFICANT CHANGE UP (ref 42.2–75.2)
NON HDL CHOLESTEROL: 157 MG/DL — HIGH
NRBC # BLD: 0 /100 WBCS — SIGNIFICANT CHANGE UP (ref 0–0)
PLATELET # BLD AUTO: 242 K/UL — SIGNIFICANT CHANGE UP (ref 130–400)
PMV BLD: 9.5 FL — SIGNIFICANT CHANGE UP (ref 7.4–10.4)
POTASSIUM SERPL-MCNC: 3.9 MMOL/L — SIGNIFICANT CHANGE UP (ref 3.5–5)
POTASSIUM SERPL-SCNC: 3.9 MMOL/L — SIGNIFICANT CHANGE UP (ref 3.5–5)
PROT SERPL-MCNC: 6.7 G/DL — SIGNIFICANT CHANGE UP (ref 6–8)
RAPID RVP RESULT: DETECTED
RBC # BLD: 4.6 M/UL — SIGNIFICANT CHANGE UP (ref 4.2–5.4)
RBC # FLD: 14.2 % — SIGNIFICANT CHANGE UP (ref 11.5–14.5)
RV+EV RNA SPEC QL NAA+PROBE: DETECTED
SARS-COV-2 RNA SPEC QL NAA+PROBE: SIGNIFICANT CHANGE UP
SODIUM SERPL-SCNC: 142 MMOL/L — SIGNIFICANT CHANGE UP (ref 135–146)
T4 AB SER-ACNC: 7.1 UG/DL — SIGNIFICANT CHANGE UP (ref 4.6–12)
TRIGL SERPL-MCNC: 96 MG/DL — SIGNIFICANT CHANGE UP
TROPONIN T SERPL-MCNC: 0.03 NG/ML — CRITICAL HIGH
TROPONIN T SERPL-MCNC: 0.03 NG/ML — CRITICAL HIGH
TSH SERPL-MCNC: 3.3 UIU/ML — SIGNIFICANT CHANGE UP (ref 0.27–4.2)
WBC # BLD: 6.92 K/UL — SIGNIFICANT CHANGE UP (ref 4.8–10.8)
WBC # FLD AUTO: 6.92 K/UL — SIGNIFICANT CHANGE UP (ref 4.8–10.8)

## 2023-10-16 PROCEDURE — 99232 SBSQ HOSP IP/OBS MODERATE 35: CPT

## 2023-10-16 PROCEDURE — 99222 1ST HOSP IP/OBS MODERATE 55: CPT

## 2023-10-16 RX ORDER — FUROSEMIDE 40 MG
40 TABLET ORAL DAILY
Refills: 0 | Status: DISCONTINUED | OUTPATIENT
Start: 2023-10-17 | End: 2023-10-17

## 2023-10-16 RX ADMIN — CARVEDILOL PHOSPHATE 6.25 MILLIGRAM(S): 80 CAPSULE, EXTENDED RELEASE ORAL at 17:28

## 2023-10-16 RX ADMIN — Medication 40 MILLIGRAM(S): at 17:29

## 2023-10-16 RX ADMIN — Medication 650 MILLIGRAM(S): at 08:05

## 2023-10-16 RX ADMIN — CARVEDILOL PHOSPHATE 6.25 MILLIGRAM(S): 80 CAPSULE, EXTENDED RELEASE ORAL at 05:18

## 2023-10-16 RX ADMIN — CARVEDILOL PHOSPHATE 6.25 MILLIGRAM(S): 80 CAPSULE, EXTENDED RELEASE ORAL at 00:18

## 2023-10-16 RX ADMIN — Medication 650 MILLIGRAM(S): at 09:05

## 2023-10-16 RX ADMIN — ATORVASTATIN CALCIUM 40 MILLIGRAM(S): 80 TABLET, FILM COATED ORAL at 21:49

## 2023-10-16 RX ADMIN — ENOXAPARIN SODIUM 40 MILLIGRAM(S): 100 INJECTION SUBCUTANEOUS at 05:18

## 2023-10-16 RX ADMIN — Medication 40 MILLIGRAM(S): at 05:18

## 2023-10-16 NOTE — PATIENT PROFILE ADULT - FUNCTIONAL ASSESSMENT - BASIC MOBILITY 6.
3-calculated by average/Not able to assess (calculate score using [facility] averaging method)

## 2023-10-16 NOTE — PHYSICAL THERAPY INITIAL EVALUATION ADULT - ADDITIONAL COMMENTS
pt states she lives alone, 4 TETO, was independently ambulating,sometimes using a cane, and climbing stairs independently.

## 2023-10-16 NOTE — PATIENT PROFILE ADULT - FALL HARM RISK - RISK INTERVENTIONS

## 2023-10-16 NOTE — PHYSICAL THERAPY INITIAL EVALUATION ADULT - REHAB POTENTIAL, PT EVAL
Subjective   Mariya is a 3 year old female who is accompanied by:mother     Well Child 3 Year    HPI  Additional concerns today: None     Review of Systems   All other systems reviewed and are negative.      Patient's medications, allergies, past medical, surgical, social and family histories were reviewed and updated as appropriate.    Objective   BP 92/60   Ht 3' 9.47\" (1.155 m)   Wt 21.6 kg (47 lb 9.9 oz)   BMI 16.19 kg/m²   BSA 0.83 m²   BMI Percentile: 76 %ile (Z= 0.71) based on CDC (Girls, 2-20 Years) BMI-for-age based on BMI available as of 7/3/2023.  Growth parameters appropriate for age? Yes    Physical Exam  Constitutional:       General: She is not in acute distress.     Appearance: She is well-developed.   HENT:      Head: Normocephalic.      Right Ear: Tympanic membrane, ear canal and external ear normal.      Left Ear: Tympanic membrane, ear canal and external ear normal.      Nose: Nose normal.      Mouth/Throat:      Mouth: Mucous membranes are moist.      Pharynx: Oropharynx is clear.      Neck: Normal range of motion and neck supple.   Eyes:      Pupils: Pupils are equal, round, and reactive to light.   Cardiovascular:      Rate and Rhythm: Normal rate and regular rhythm.      Heart sounds: S1 normal and S2 normal. No murmur heard.  Pulmonary:      Effort: Pulmonary effort is normal.      Breath sounds: Normal breath sounds.   Abdominal:      General: There is no distension.      Palpations: Abdomen is soft. There is no mass.      Tenderness: There is no abdominal tenderness.   Genitourinary:     Comments: Normal for age  Musculoskeletal:         General: No deformity. Normal range of motion.   Lymphadenopathy:      Cervical: No cervical adenopathy.   Skin:     General: Skin is warm.      Findings: No rash.   Neurological:      Mental Status: She is alert.      Sensory: No sensory deficit.      Motor: No abnormal muscle tone.         Assessment   Problem List Items Addressed This Visit     None  Visit Diagnoses     Encounter for routine child health examination without abnormal findings    -  Primary    Relevant Orders    DTAP IPV VACC 4 THRU 6 YRS (Completed)          Screening tests  Developmental milestones were reviewed and were: normal based on age.    Counseling  Nutrition/Weight Management:  Assessment and discussion of current Nutrition behaviors performed:  Yes  Assessment and discussion of current Physical Activity behaviors performed:   Yes    Immunizations: per orders.  History of previous adverse reactions to immunizations? no  Immunizations given today and vaccine counseling including benefits, risks, and adverse reactions were provided by myself during the visit.    School form was provided at today's visit    Follow-up yearly for a well visit, or sooner as needed.   good, to achieve stated therapy goals

## 2023-10-16 NOTE — PATIENT PROFILE ADULT - STATED REASON FOR ADMISSION
as per ems pt went to urgent care with mild chest pain. pt been non compliant with her meds for 5 days. ems gave 4 asa  chest pain

## 2023-10-16 NOTE — PROGRESS NOTE ADULT - SUBJECTIVE AND OBJECTIVE BOX
URIEL MCDANIEL  83y, Female  Allergy: No Known Allergies    Hospital Day: 1d    Patient seen and examined earlier today. patient with no major complaints  she does not use oxygen at home. family at bedside   as per family the nasal fracture was many years ago and patient refused surgery. again had a fall few weeks ago,.     PMH/PSH:  PAST MEDICAL & SURGICAL HISTORY:      LAST 24-Hr EVENTS:    VITALS:  T(F): 98.1 (10-16-23 @ 15:48), Max: 98.8 (10-16-23 @ 05:23)  HR: 79 (10-16-23 @ 15:48)  BP: 113/79 (10-16-23 @ 15:48) (112/76 - 150/78)  RR: 18 (10-16-23 @ 15:48)  SpO2: 99% (10-16-23 @ 15:48)          TESTS & MEASUREMENTS:  Weight/BMI  72.6 (10-15-23 @ 11:19)                          13.2   6.92  )-----------( 242      ( 16 Oct 2023 06:42 )             42.1         10-16    142  |  100  |  21<H>  ----------------------------<  86  3.9   |  27  |  1.1    Ca    8.7      16 Oct 2023 06:42  Mg     1.9     10-16    TPro  6.7  /  Alb  4.2  /  TBili  1.1  /  DBili  x   /  AST  20  /  ALT  24  /  AlkPhos  50  10-16    LIVER FUNCTIONS - ( 16 Oct 2023 06:42 )  Alb: 4.2 g/dL / Pro: 6.7 g/dL / ALK PHOS: 50 U/L / ALT: 24 U/L / AST: 20 U/L / GGT: x           CARDIAC MARKERS ( 16 Oct 2023 06:42 )  x     / 0.03 ng/mL / x     / x     / x      CARDIAC MARKERS ( 16 Oct 2023 01:15 )  x     / 0.03 ng/mL / x     / x     / x      CARDIAC MARKERS ( 15 Oct 2023 14:35 )  x     / 0.02 ng/mL / x     / x     / x            Urinalysis Basic - ( 16 Oct 2023 06:42 )    Color: x / Appearance: x / SG: x / pH: x  Gluc: 86 mg/dL / Ketone: x  / Bili: x / Urobili: x   Blood: x / Protein: x / Nitrite: x   Leuk Esterase: x / RBC: x / WBC x   Sq Epi: x / Non Sq Epi: x / Bacteria: x        D-Dimer Assay, Quantitative: 474 ng/mL DDU (10-15-23 @ 14:35)              A1C with Estimated Average Glucose Result: 5.8 % (10-16-23 @ 06:42)          RADIOLOGY, ECG, & ADDITIONAL TESTS:  12 Lead ECG:   Ventricular Rate 94 BPM    Atrial Rate 94 BPM    P-R Interval 178 ms    QRS Duration 90 ms    Q-T Interval 390 ms    QTC Calculation(Bazett) 487 ms    P Axis 78 degrees    R Axis 106 degrees    T Axis 37 degrees    Diagnosis Line Sinus rhythm with occasional Premature ventricular complexes and Premature  atrial complexes  Rightward axis  Nonspecific T wave abnormality  Prolonged QT  Abnormal ECG    Confirmed by Efrem De Paz ([id number]) on 10/15/2023 1:52:05 PM (10-15-23 @ 11:26)    CT Angio Chest PE Protocol w/ IV Cont:   ACC: [id number] EXAM:  CT ANGIO CHEST PULM ART [facility]   ORDERED BY: TASHA COBIAN     PROCEDURE DATE:  10/15/2023          INTERPRETATION:  CLINICAL HISTORY / REASON FOR EXAM: Shortness of breath    TECHNIQUE: Multislice helical sections were obtained from the thoracic   inlet to the lung bases during rapid administration of 75 mL Omnipaque   350 intravenous contrast using a CTA pulmonary embolism protocol, 25 mL   discarded. Thin sections were reconstructed through the pulmonary   vasculature. Coronal, sagittal and 3D/MIP reformatted images are also   submitted.    COMPARISON CT: None.    FINDINGS:    PULMONARY EMBOLUS: No central or segmental pulmonary embolus.    TUBES/LINES: None.    LUNGS, PLEURA, AIRWAYS: Small bilateral pleural effusions with   compressive atelectasis No pneumothorax. No consolidation. Central   airways patent.    THORACIC NODES: No mediastinal or axillary lymphadenopathy.    MEDIASTINUM/GREAT VESSELS: Reflux of contrast into the inferior vena cava   and hepatic veins. No pericardial effusion. Heart size unremarkable.   Multivessel coronary artery calcifications. No aneurysmal dilation of the   thoracic aorta.    VISUALIZED UPPER ABDOMEN: Unremarkable.    BONES/SOFT TISSUES: Degenerative changes of the thoracic spine.      IMPRESSION:    No CTA evidence of acute pulmonary embolus.    Reflux of contrast into the inferior vena cava and hepatic veins. Small   bilateral pleural effusions and mild mosaic lung attenuation. Findings   are compatible with right heart failure.    --- End of Report ---          RACHEL HEART MD; Resident Radiologist  This document has been electronically signed.  ESTELA EDWARDS MD; Attending Radiologist  This document has been electronically signed. Oct 15 2023  8:36PM (10-15-23 @ 17:10)  CT Head No Cont:   ACC: [id number] EXAM:  CT MAXILLOFACIAL   ORDERED BY: TASHA COBIAN     ACC: [id number] EXAM:  CT CERVICAL SPINE   ORDERED BY: TASHA COBIAN     ACC: [id number] EXAM:  CT BRAIN   ORDERED BY: TASHA COBIAN     PROCEDURE DATE:  10/15/2023          INTERPRETATION:  CLINICAL INDICATIONS:  Fall with head strike.    TECHNIQUE:  Noncontrast head, maxillofacial and cervical spine CT was   performed.    Multiple contiguous axial images were obtained from the skull base to the   vertex without the use of intravenous contrast. Reformatted coronal and   sagittal images were were subsequently obtained and reviewed.    Axial 1.3 mm sections from the frontal sinuses through the maxilla.  The   study was supplemented with coronal and sagittal reformatted images.   Intravenous contrast was not administered    Axial images were obtained through the cervical spine using multislice   helical technique.  Reformatted coronal and sagittal images were were   subsequently obtained and reviewed.    COMPARISON: None.    FINDINGS:    CT BRAIN:    There is no evidence for acute intracranial hemorrhage, mass effect or   midline shift.    There is periventricular and scattered subcortical white matter   hypodensity.    The basal cisterns are patent. There is no hydrocephalus.    There is no extra-axial collection.    The calvarium is intact, without depressed fracture. The mastoid air   cells are clear.    MAXILLOFACIAL BONES:    Heart palate there are bilateral nasal fractures which are displaced and   deviated to the right. Origins    The visualized sinuses demonstrate no evidence of opacification or   mucosal thickening.    The soft tissues are grossly unremarkable.    The orbits are unremarkable. The extraocular muscles, optic nerves and   retrobulbar fat are unremarkable.    The visualized neck soft tissues are unremarkable.      CT CERVICAL SPINE:    There is no prevertebral soft tissue swelling. There is no splaying of   the spinous processes. The occipital condyles are normal. Lateral masses   of C1 align normally with C2. No lucent fracture line is identified.   There is no spondylolisthesis.    Multilevel degenerative changes are present. Findings include   uncovertebral spurring, loss of normal disc space height, endplate   sclerosis, and facet joint arthrosis.    Spinal canal contents are grossly unremarkable though suboptimally   evaluated inherent to CT technique.    The visualized lung apices are within normal limits.    Miscellaneous:  None.    IMPRESSION:    CT HEAD:  No acute intracranial pathology or hemorrhage. No acute calvarial   fracture.    Mild chronic microvascular type changes.    MAXILLOFACIAL BONES:  Bilateral nasal bone fractures are noted with displacement and deviation   to the right..    CT CERVICAL SPINE:  No acute fracture or subluxation.    --- End of Report ---            LIBBY SNYDER MD; Attending Radiologist  This document has been electronically signed. Oct 15 2023  1:44PM (10-15-23 @ 13:00)    RECENT DIAGNOSTIC ORDERS:  Diet, Regular:   DASH/TLC Sodium & Cholesterol Restricted  1500mL Fluid Restriction ([id number])  Low Sodium (10-15-23 @ 22:40)  TTE Echo Complete w/o Contrast w/ Doppler:   Transport: Stretcher-Crib  Monitor: w/o Monitor (10-15-23 @ 20:42)      MEDICATIONS:  MEDICATIONS  (STANDING):  atorvastatin 40 milliGRAM(s) Oral at bedtime  carvedilol 6.25 milliGRAM(s) Oral every 12 hours  enoxaparin Injectable 40 milliGRAM(s) SubCutaneous every 24 hours  furosemide   Injectable 40 milliGRAM(s) IV Push every 12 hours    MEDICATIONS  (PRN):  acetaminophen     Tablet .. 650 milliGRAM(s) Oral every 6 hours PRN Temp greater or equal to 38C (100.4F), Mild Pain (1 - 3)  aluminum hydroxide/magnesium hydroxide/simethicone Suspension 30 milliLiter(s) Oral every 4 hours PRN Dyspepsia  diazepam    Tablet 5 milliGRAM(s) Oral at bedtime PRN for insomnia  ondansetron Injectable 4 milliGRAM(s) IV Push every 8 hours PRN Nausea and/or Vomiting      HOME MEDICATIONS:  atorvastatin 40 mg oral tablet (10-15)  carvedilol 6.25 mg oral tablet (10-15)  diazePAM 5 mg oral tablet (10-15)  Lasix 40 mg oral tablet (10-15)  lisinopril 10 mg oral tablet (10-15)      PHYSICAL EXAM:  GEN: No acute distress. Awake, Alert and oriented x 3.   Head: Atraumatic, Normocephalic.   Eye: PEERLA. No sclera icterus. EOMI.   LUNGS: no audible wheezing   HEART: Normal. S1/S2 present. RRR. No murmur/gallops.   ABD: Soft, non-tender, non-distended. Bowel sounds present.   EXT: 1+ pitting edema B/L LE . No erythema. No tenderness.  Integumentary: No rash, No sore, No petechia.   NEURO: CN III-XII intact. Strength: 5/5 b/l ULE. Sensory intact b/l ULE.

## 2023-10-16 NOTE — PHYSICAL THERAPY INITIAL EVALUATION ADULT - PERTINENT HX OF CURRENT PROBLEM, REHAB EVAL
83F w/ PMHx CHF, HTN, HLD and remote smoking hx presents to the ED with shortness of breath. Patient is visiting family from [place] and forgot her medications for the past few days. Prior to the ED, she was at urgent care where she was found to be hypoxic in the mid 80s on RA. Patient is not on home O2. She admits to some lightheadedness, dry cough, leg swelling, left leg pain and nose pain, but denies palpitations, chest pain, headaches, fever, chills, sore throat, abdominal pain, nausea, vomiting, diarrhea or dysuria. Of note, patient's daughter passed away 2 weeks ago and is undergoing a lot of emotional stress. She also had a mechanical fall a few days ago with head and LLE trauma.

## 2023-10-16 NOTE — CONSULT NOTE ADULT - SUBJECTIVE AND OBJECTIVE BOX
HPI:  83F w/ PMHx CHF, HTN, HLD and remote smoking hx presents to the ED with shortness of breath. Patient is visiting family from [place] and forgot her medications for the past few days. Prior to the ED, she was at urgent care where she was found to be hypoxic in the mid 80s on RA. Patient is not on home O2. She admits to some lightheadedness, dry cough, leg swelling, left leg pain and nose pain, but denies palpitations, chest pain, headaches, fever, chills, sore throat, abdominal pain, nausea, vomiting, diarrhea or dysuria. Of note, patient's daughter passed away 2 weeks ago and is undergoing a lot of emotional stress. She also had a mechanical fall a few days ago with head and LLE trauma.    Vitals: Temp 99F, /99, HR 93, RR 19, SpO2 96% on 3L NC    Labs: K 5.1, Trop 0.02, BNP 43826 (unknown baseline), D-dimer 474    Imaging:  - CT Head/Maxillofacial/Neck: Bilateral nasal bone fractures are noted with displacement and deviation to the right, otherwise no other acute intracranial pathology.  - CTA Chest: No PE noted. Small bilateral pleural effusions with compressive atelectasis. No consolidations. Reflux of contrast into the inferior vena cava and hepatic veins. Small bilateral pleural effusions and mild mosaic lung attenuation. Findings are compatible with right heart failure.    EKG showed sinus rhythm w/ PACs and PVCs    In the ED:  - s/p Lasix 40mg IV x2  - Duonebs x1  - Decadron 6mg IV x1    Admitted to medicine/telemetry for further management (15 Oct 2023 21:22)      PAST MEDICAL & SURGICAL HISTORY  see above    FAMILY HISTORY:  FAMILY HISTORY:      SOCIAL HISTORY:  Social History:      ALLERGIES:  No Known Allergies      MEDICATIONS:  atorvastatin 40 milliGRAM(s) Oral at bedtime  carvedilol 6.25 milliGRAM(s) Oral every 12 hours  enoxaparin Injectable 40 milliGRAM(s) SubCutaneous every 24 hours  furosemide   Injectable 40 milliGRAM(s) IV Push every 12 hours    PRN:  acetaminophen     Tablet .. 650 milliGRAM(s) Oral every 6 hours PRN  aluminum hydroxide/magnesium hydroxide/simethicone Suspension 30 milliLiter(s) Oral every 4 hours PRN  diazepam    Tablet 5 milliGRAM(s) Oral at bedtime PRN  ondansetron Injectable 4 milliGRAM(s) IV Push every 8 hours PRN      HOME MEDICATIONS:  Home Medications:  atorvastatin 40 mg oral tablet: 1 tab(s) orally once a day (at bedtime) (15 Oct 2023 22:29)  carvedilol 6.25 mg oral tablet: 1 tab(s) orally every 12 hours (15 Oct 2023 22:29)  diazePAM 5 mg oral tablet: 1 tab(s) orally once a day (at bedtime) as needed for  insomnia (15 Oct 2023 22:30)  Lasix 40 mg oral tablet: 1 tab(s) orally once a day (15 Oct 2023 22:30)  lisinopril 10 mg oral tablet: 1 tab(s) orally once a day (15 Oct 2023 22:30)      VITALS:   T(F): 98.4 (10-16 @ 08:04), Max: 99 (10-15 @ 11:19)  HR: 86 (10-16 @ 08:04) (63 - 93)  BP: 120/78 (10-16 @ 08:04) (112/76 - 150/78)  BP(mean): --  RR: 18 (10-16 @ 08:04) (18 - 19)  SpO2: 95% (10-16 @ 08:04) (95% - 99%)      REVIEW OF SYSTEMS:  CONSTITUTIONAL: generalized weakness  HEENT: No visual changes, neck/ear pain  RESPIRATORY: see HPI  CARDIOVASCULAR: See HPI  GASTROINTESTINAL: No abdominal pain. No nausea, vomiting, diarrhea   GENITOURINARY: No dysuria, frequency or hematuria  NEUROLOGICAL: No new focal deficits  SKIN: No new rashes    PHYSICAL EXAM:  General: Not in distress.     HEENT: EOMI  Cardio: regular, S1, S2   Pulm: B/L rales  Abdomen: Soft, non-tender, non-distended. Normoactive bowel sounds  Extremities: LE edema   Neuro: A&O x3. No focal deficits    LABS:                        13.2   6.92  )-----------( 242      ( 16 Oct 2023 06:42 )             42.1     10-16    142  |  100  |  21<H>  ----------------------------<  86  3.9   |  27  |  1.1    Ca    8.7      16 Oct 2023 06:42  Mg     1.9     10-16    TPro  6.7  /  Alb  4.2  /  TBili  1.1  /  DBili  x   /  AST  20  /  ALT  24  /  AlkPhos  50  10-16      Troponin T, Serum: 0.03 ng/mL *HH* (10-16-23 @ 06:42)  Troponin T, Serum: 0.03 ng/mL *HH* (10-16-23 @ 01:15)  Troponin T, Serum: 0.02 ng/mL *H* (10-15-23 @ 14:35)    CARDIAC MARKERS ( 16 Oct 2023 06:42 )  x     / 0.03 ng/mL / x     / x     / x      CARDIAC MARKERS ( 16 Oct 2023 01:15 )  x     / 0.03 ng/mL / x     / x     / x      CARDIAC MARKERS ( 15 Oct 2023 14:35 )  x     / 0.02 ng/mL / x     / x     / x            10-16 Chol 223<H> LDL -- HDL 66 Trig 96  SARS-CoV-2: NotDetec (15 Oct 2023 15:23)      RADIOLOGY:  < from: CT Angio Chest PE Protocol w/ IV Cont (10.15.23 @ 17:10) >  IMPRESSION:    No CTA evidence of acute pulmonary embolus.    Reflux of contrast into the inferior vena cava and hepatic veins. Small   bilateral pleural effusions and mild mosaic lung attenuation. Findings   are compatible with right heart failure.    --- End of Report ---    < end of copied text >    -TTE:  NA  -CCTA: NA  -STRESS TEST: NA  -CATHETERIZATION: NA    -OTHER:  EC Lead ECG:   Ventricular Rate 94 BPM    Atrial Rate 94 BPM    P-R Interval 178 ms    QRS Duration 90 ms    Q-T Interval 390 ms    QTC Calculation(Bazett) 487 ms    P Axis 78 degrees    R Axis 106 degrees    T Axis 37 degrees    Diagnosis Line Sinus rhythm with occasional Premature ventricular complexes and Premature  atrial complexes  Rightward axis  Nonspecific T wave abnormality  Prolonged QT  Abnormal ECG    Confirmed by Efrem De Paz ([id number]) on 10/15/2023 1:52:05 PM (10-15 @ 11:26)      TELEMETRY EVENTS: No events noted HPI:  83F w/ PMHx CHF, HTN, HLD and remote smoking hx presents to the ED with shortness of breath. Patient is visiting family from [place] and forgot her medications for the past few days. Prior to the ED, she was at urgent care where she was found to be hypoxic in the mid 80s on RA. Patient is not on home O2. She admits to some lightheadedness, dry cough, leg swelling, left leg pain and nose pain, but denies palpitations, chest pain, headaches, fever, chills, sore throat, abdominal pain, nausea, vomiting, diarrhea or dysuria. Of note, patient's daughter passed away 2 weeks ago and is undergoing a lot of emotional stress. She also had a mechanical fall a few days ago with head and LLE trauma.    Vitals: Temp 99F, /99, HR 93, RR 19, SpO2 96% on 3L NC    Labs: K 5.1, Trop 0.02, BNP 73885 (unknown baseline), D-dimer 474    Imaging:  - CT Head/Maxillofacial/Neck: Bilateral nasal bone fractures are noted with displacement and deviation to the right, otherwise no other acute intracranial pathology.  - CTA Chest: No PE noted. Small bilateral pleural effusions with compressive atelectasis. No consolidations. Reflux of contrast into the inferior vena cava and hepatic veins. Small bilateral pleural effusions and mild mosaic lung attenuation. Findings are compatible with right heart failure.    EKG showed sinus rhythm w/ PACs and PVCs    In the ED:  - s/p Lasix 40mg IV x2  - Duonebs x1  - Decadron 6mg IV x1    Admitted to medicine/telemetry for further management (15 Oct 2023 21:22)      PAST MEDICAL & SURGICAL HISTORY  see above    FAMILY HISTORY:  FAMILY HISTORY:      SOCIAL HISTORY:  Social History:      ALLERGIES:  No Known Allergies      MEDICATIONS:  atorvastatin 40 milliGRAM(s) Oral at bedtime  carvedilol 6.25 milliGRAM(s) Oral every 12 hours  enoxaparin Injectable 40 milliGRAM(s) SubCutaneous every 24 hours  furosemide   Injectable 40 milliGRAM(s) IV Push every 12 hours    PRN:  acetaminophen     Tablet .. 650 milliGRAM(s) Oral every 6 hours PRN  aluminum hydroxide/magnesium hydroxide/simethicone Suspension 30 milliLiter(s) Oral every 4 hours PRN  diazepam    Tablet 5 milliGRAM(s) Oral at bedtime PRN  ondansetron Injectable 4 milliGRAM(s) IV Push every 8 hours PRN      HOME MEDICATIONS:  Home Medications:  atorvastatin 40 mg oral tablet: 1 tab(s) orally once a day (at bedtime) (15 Oct 2023 22:29)  carvedilol 6.25 mg oral tablet: 1 tab(s) orally every 12 hours (15 Oct 2023 22:29)  diazePAM 5 mg oral tablet: 1 tab(s) orally once a day (at bedtime) as needed for  insomnia (15 Oct 2023 22:30)  Lasix 40 mg oral tablet: 1 tab(s) orally once a day (15 Oct 2023 22:30)  lisinopril 10 mg oral tablet: 1 tab(s) orally once a day (15 Oct 2023 22:30)      VITALS:   T(F): 98.4 (10-16 @ 08:04), Max: 99 (10-15 @ 11:19)  HR: 86 (10-16 @ 08:04) (63 - 93)  BP: 120/78 (10-16 @ 08:04) (112/76 - 150/78)  BP(mean): --  RR: 18 (10-16 @ 08:04) (18 - 19)  SpO2: 95% (10-16 @ 08:04) (95% - 99%)      REVIEW OF SYSTEMS:  CONSTITUTIONAL: generalized weakness  HEENT: No visual changes, neck/ear pain  RESPIRATORY: see HPI  CARDIOVASCULAR: See HPI  GASTROINTESTINAL: No abdominal pain. No nausea, vomiting, diarrhea   GENITOURINARY: No dysuria, frequency or hematuria  NEUROLOGICAL: No new focal deficits  SKIN: No new rashes    PHYSICAL EXAM:  General: Not in distress.     HEENT: EOMI  Cardio: regular, S1, S2   Pulm: B/L rales  Abdomen: Soft, non-tender, non-distended. Normoactive bowel sounds  Extremities: LE edema   Neuro: A&O x3. No focal deficits    LABS:                        13.2   6.92  )-----------( 242      ( 16 Oct 2023 06:42 )             42.1     10-16    142  |  100  |  21<H>  ----------------------------<  86  3.9   |  27  |  1.1    Ca    8.7      16 Oct 2023 06:42  Mg     1.9     10-16    TPro  6.7  /  Alb  4.2  /  TBili  1.1  /  DBili  x   /  AST  20  /  ALT  24  /  AlkPhos  50  10-16      Troponin T, Serum: 0.03 ng/mL *HH* (10-16-23 @ 06:42)  Troponin T, Serum: 0.03 ng/mL *HH* (10-16-23 @ 01:15)  Troponin T, Serum: 0.02 ng/mL *H* (10-15-23 @ 14:35)    CARDIAC MARKERS ( 16 Oct 2023 06:42 )  x     / 0.03 ng/mL / x     / x     / x      CARDIAC MARKERS ( 16 Oct 2023 01:15 )  x     / 0.03 ng/mL / x     / x     / x      CARDIAC MARKERS ( 15 Oct 2023 14:35 )  x     / 0.02 ng/mL / x     / x     / x            10-16 Chol 223<H> LDL -- HDL 66 Trig 96  SARS-CoV-2: NotDetec (15 Oct 2023 15:23)      RADIOLOGY:  < from: CT Angio Chest PE Protocol w/ IV Cont (10.15.23 @ 17:10) >  IMPRESSION:    No CTA evidence of acute pulmonary embolus.    Reflux of contrast into the inferior vena cava and hepatic veins. Small   bilateral pleural effusions and mild mosaic lung attenuation. Findings   are compatible with right heart failure.    --- End of Report ---    < end of copied text >    -TTE:  NA  -CCTA: NA  -STRESS TEST: NA  -CATHETERIZATION: NA    -OTHER:  EC Lead ECG:   Ventricular Rate 94 BPM    Atrial Rate 94 BPM    P-R Interval 178 ms    QRS Duration 90 ms    Q-T Interval 390 ms    QTC Calculation(Bazett) 487 ms    P Axis 78 degrees    R Axis 106 degrees    T Axis 37 degrees    Diagnosis Line Sinus rhythm with occasional Premature ventricular complexes and Premature  atrial complexes  Rightward axis  Nonspecific T wave abnormality  Prolonged QT  Abnormal ECG    Confirmed by Efrem De Paz ([id number]) on 10/15/2023 1:52:05 PM (10-15 @ 11:26)      TELEMETRY EVENTS: No events noted

## 2023-10-16 NOTE — CONSULT NOTE ADULT - ATTENDING COMMENTS
83yoF with HTN (on carvedilol 6.25 mg BID, lisinopril 10 mg daily, and Lasix 40 mg PO daily), HLD (on atorvastatin 40 mg nightly), and prior tobacco use who presented to the ED for worsening dyspnea in the context of 5 months of dyspnea.     Patient lives in [place], but has been recently staying in [place] with her daughter. She did not bring her medications, and therefore has not been taking her pills for the past 5 days. Per her daughter, the patient does not let her family know about any symptoms. She endorsed some SOB and was taken to an [facility] where her O2 saturation was found to be low, prompting presentation to the ED.     In the ED, CTA ruled out PE, but did show GGO and small bilateral pleural effusions. BNP > 12k. RVP (+) for Entero/rhinovirus.     It is unclear if the patient's symptoms are due to a viral URI/pneumonia or volume overload, likely a combination of both. For her pleural effusions and 5 days of medication noncompliance, agree with Lasix 40 mg IV BID. Please see below for more detailed instructions.     Recommendations:   - Continue Lasix 40 mg IV BID   - Check BMP BID  - When patient develops contraction alkalosis or Cr uptrends, please stop IV diuretics and resume home dose of Lasix 40 mg PO daily  - If oxygen requirement does not resolve with IV diuretics, her hypoxia may be due to viral URI/PNA  - TTE pending  - Continue carvedilol 6.25 mg BID and lisinopril 10 mg daily   - Please obtain outpatient records from her cardiologist  - Cardiology consult team to follow up once echo is read

## 2023-10-16 NOTE — ED ADULT NURSE NOTE - CHIEF COMPLAINT QUOTE
as per ems pt went to urgent care with mild chest pain. pt been non compliant with her meds for 5 days. ems gave 4 asa

## 2023-10-16 NOTE — PHYSICAL THERAPY INITIAL EVALUATION ADULT - GENERAL OBSERVATIONS, REHAB EVAL
3:00-3:30 pt encountere din bed in NAD, +tele, primafit. + O2 via nasal canula, 4L/min SPO2 was 93%. Pt had fair tolerance for transfers and ambulation, due to SOB on exertion.    Pt would benefit from home PT to restore prior level of mobility and safety.

## 2023-10-17 ENCOUNTER — TRANSCRIPTION ENCOUNTER (OUTPATIENT)
Age: 83
End: 2023-10-17

## 2023-10-17 LAB
ALBUMIN SERPL ELPH-MCNC: 4 G/DL — SIGNIFICANT CHANGE UP (ref 3.5–5.2)
ALBUMIN SERPL ELPH-MCNC: 4 G/DL — SIGNIFICANT CHANGE UP (ref 3.5–5.2)
ALP SERPL-CCNC: 47 U/L — SIGNIFICANT CHANGE UP (ref 30–115)
ALP SERPL-CCNC: 47 U/L — SIGNIFICANT CHANGE UP (ref 30–115)
ALT FLD-CCNC: 24 U/L — SIGNIFICANT CHANGE UP (ref 0–41)
ALT FLD-CCNC: 24 U/L — SIGNIFICANT CHANGE UP (ref 0–41)
ANION GAP SERPL CALC-SCNC: 10 MMOL/L — SIGNIFICANT CHANGE UP (ref 7–14)
ANION GAP SERPL CALC-SCNC: 10 MMOL/L — SIGNIFICANT CHANGE UP (ref 7–14)
ANION GAP SERPL CALC-SCNC: 11 MMOL/L — SIGNIFICANT CHANGE UP (ref 7–14)
ANION GAP SERPL CALC-SCNC: 11 MMOL/L — SIGNIFICANT CHANGE UP (ref 7–14)
AST SERPL-CCNC: 19 U/L — SIGNIFICANT CHANGE UP (ref 0–41)
AST SERPL-CCNC: 19 U/L — SIGNIFICANT CHANGE UP (ref 0–41)
BILIRUB SERPL-MCNC: 0.6 MG/DL — SIGNIFICANT CHANGE UP (ref 0.2–1.2)
BILIRUB SERPL-MCNC: 0.6 MG/DL — SIGNIFICANT CHANGE UP (ref 0.2–1.2)
BUN SERPL-MCNC: 28 MG/DL — HIGH (ref 10–20)
BUN SERPL-MCNC: 28 MG/DL — HIGH (ref 10–20)
BUN SERPL-MCNC: 43 MG/DL — HIGH (ref 10–20)
BUN SERPL-MCNC: 43 MG/DL — HIGH (ref 10–20)
CALCIUM SERPL-MCNC: 8.6 MG/DL — SIGNIFICANT CHANGE UP (ref 8.4–10.5)
CALCIUM SERPL-MCNC: 8.6 MG/DL — SIGNIFICANT CHANGE UP (ref 8.4–10.5)
CALCIUM SERPL-MCNC: 8.9 MG/DL — SIGNIFICANT CHANGE UP (ref 8.4–10.4)
CALCIUM SERPL-MCNC: 8.9 MG/DL — SIGNIFICANT CHANGE UP (ref 8.4–10.4)
CHLORIDE SERPL-SCNC: 101 MMOL/L — SIGNIFICANT CHANGE UP (ref 98–110)
CHLORIDE SERPL-SCNC: 101 MMOL/L — SIGNIFICANT CHANGE UP (ref 98–110)
CHLORIDE SERPL-SCNC: 96 MMOL/L — LOW (ref 98–110)
CHLORIDE SERPL-SCNC: 96 MMOL/L — LOW (ref 98–110)
CO2 SERPL-SCNC: 28 MMOL/L — SIGNIFICANT CHANGE UP (ref 17–32)
CO2 SERPL-SCNC: 28 MMOL/L — SIGNIFICANT CHANGE UP (ref 17–32)
CO2 SERPL-SCNC: 30 MMOL/L — SIGNIFICANT CHANGE UP (ref 17–32)
CO2 SERPL-SCNC: 30 MMOL/L — SIGNIFICANT CHANGE UP (ref 17–32)
CREAT SERPL-MCNC: 1.1 MG/DL — SIGNIFICANT CHANGE UP (ref 0.7–1.5)
CREAT SERPL-MCNC: 1.1 MG/DL — SIGNIFICANT CHANGE UP (ref 0.7–1.5)
CREAT SERPL-MCNC: 1.7 MG/DL — HIGH (ref 0.7–1.5)
CREAT SERPL-MCNC: 1.7 MG/DL — HIGH (ref 0.7–1.5)
EGFR: 29 ML/MIN/1.73M2 — LOW
EGFR: 29 ML/MIN/1.73M2 — LOW
EGFR: 49 ML/MIN/1.73M2 — LOW
EGFR: 49 ML/MIN/1.73M2 — LOW
GLUCOSE SERPL-MCNC: 135 MG/DL — HIGH (ref 70–99)
GLUCOSE SERPL-MCNC: 135 MG/DL — HIGH (ref 70–99)
GLUCOSE SERPL-MCNC: 90 MG/DL — SIGNIFICANT CHANGE UP (ref 70–99)
GLUCOSE SERPL-MCNC: 90 MG/DL — SIGNIFICANT CHANGE UP (ref 70–99)
HCT VFR BLD CALC: 42 % — SIGNIFICANT CHANGE UP (ref 37–47)
HCT VFR BLD CALC: 42 % — SIGNIFICANT CHANGE UP (ref 37–47)
HGB BLD-MCNC: 13.2 G/DL — SIGNIFICANT CHANGE UP (ref 12–16)
HGB BLD-MCNC: 13.2 G/DL — SIGNIFICANT CHANGE UP (ref 12–16)
MAGNESIUM SERPL-MCNC: 2.1 MG/DL — SIGNIFICANT CHANGE UP (ref 1.8–2.4)
MAGNESIUM SERPL-MCNC: 2.1 MG/DL — SIGNIFICANT CHANGE UP (ref 1.8–2.4)
MCHC RBC-ENTMCNC: 28.8 PG — SIGNIFICANT CHANGE UP (ref 27–31)
MCHC RBC-ENTMCNC: 28.8 PG — SIGNIFICANT CHANGE UP (ref 27–31)
MCHC RBC-ENTMCNC: 31.4 G/DL — LOW (ref 32–37)
MCHC RBC-ENTMCNC: 31.4 G/DL — LOW (ref 32–37)
MCV RBC AUTO: 91.7 FL — SIGNIFICANT CHANGE UP (ref 81–99)
MCV RBC AUTO: 91.7 FL — SIGNIFICANT CHANGE UP (ref 81–99)
NRBC # BLD: 0 /100 WBCS — SIGNIFICANT CHANGE UP (ref 0–0)
NRBC # BLD: 0 /100 WBCS — SIGNIFICANT CHANGE UP (ref 0–0)
PLATELET # BLD AUTO: 229 K/UL — SIGNIFICANT CHANGE UP (ref 130–400)
PLATELET # BLD AUTO: 229 K/UL — SIGNIFICANT CHANGE UP (ref 130–400)
PMV BLD: 9.3 FL — SIGNIFICANT CHANGE UP (ref 7.4–10.4)
PMV BLD: 9.3 FL — SIGNIFICANT CHANGE UP (ref 7.4–10.4)
POTASSIUM SERPL-MCNC: 3.6 MMOL/L — SIGNIFICANT CHANGE UP (ref 3.5–5)
POTASSIUM SERPL-MCNC: 3.6 MMOL/L — SIGNIFICANT CHANGE UP (ref 3.5–5)
POTASSIUM SERPL-MCNC: 4.2 MMOL/L — SIGNIFICANT CHANGE UP (ref 3.5–5)
POTASSIUM SERPL-MCNC: 4.2 MMOL/L — SIGNIFICANT CHANGE UP (ref 3.5–5)
POTASSIUM SERPL-SCNC: 3.6 MMOL/L — SIGNIFICANT CHANGE UP (ref 3.5–5)
POTASSIUM SERPL-SCNC: 3.6 MMOL/L — SIGNIFICANT CHANGE UP (ref 3.5–5)
POTASSIUM SERPL-SCNC: 4.2 MMOL/L — SIGNIFICANT CHANGE UP (ref 3.5–5)
POTASSIUM SERPL-SCNC: 4.2 MMOL/L — SIGNIFICANT CHANGE UP (ref 3.5–5)
PROT SERPL-MCNC: 6.2 G/DL — SIGNIFICANT CHANGE UP (ref 6–8)
PROT SERPL-MCNC: 6.2 G/DL — SIGNIFICANT CHANGE UP (ref 6–8)
RBC # BLD: 4.58 M/UL — SIGNIFICANT CHANGE UP (ref 4.2–5.4)
RBC # BLD: 4.58 M/UL — SIGNIFICANT CHANGE UP (ref 4.2–5.4)
RBC # FLD: 13.9 % — SIGNIFICANT CHANGE UP (ref 11.5–14.5)
RBC # FLD: 13.9 % — SIGNIFICANT CHANGE UP (ref 11.5–14.5)
SODIUM SERPL-SCNC: 137 MMOL/L — SIGNIFICANT CHANGE UP (ref 135–146)
SODIUM SERPL-SCNC: 137 MMOL/L — SIGNIFICANT CHANGE UP (ref 135–146)
SODIUM SERPL-SCNC: 139 MMOL/L — SIGNIFICANT CHANGE UP (ref 135–146)
SODIUM SERPL-SCNC: 139 MMOL/L — SIGNIFICANT CHANGE UP (ref 135–146)
WBC # BLD: 5.4 K/UL — SIGNIFICANT CHANGE UP (ref 4.8–10.8)
WBC # BLD: 5.4 K/UL — SIGNIFICANT CHANGE UP (ref 4.8–10.8)
WBC # FLD AUTO: 5.4 K/UL — SIGNIFICANT CHANGE UP (ref 4.8–10.8)
WBC # FLD AUTO: 5.4 K/UL — SIGNIFICANT CHANGE UP (ref 4.8–10.8)

## 2023-10-17 PROCEDURE — 99232 SBSQ HOSP IP/OBS MODERATE 35: CPT

## 2023-10-17 PROCEDURE — 93010 ELECTROCARDIOGRAM REPORT: CPT

## 2023-10-17 PROCEDURE — 99223 1ST HOSP IP/OBS HIGH 75: CPT

## 2023-10-17 RX ORDER — CHLORHEXIDINE GLUCONATE 213 G/1000ML
1 SOLUTION TOPICAL
Refills: 0 | Status: DISCONTINUED | OUTPATIENT
Start: 2023-10-17 | End: 2023-10-19

## 2023-10-17 RX ORDER — FUROSEMIDE 40 MG
40 TABLET ORAL DAILY
Refills: 0 | Status: DISCONTINUED | OUTPATIENT
Start: 2023-10-18 | End: 2023-10-18

## 2023-10-17 RX ORDER — FUROSEMIDE 40 MG
40 TABLET ORAL EVERY 12 HOURS
Refills: 0 | Status: DISCONTINUED | OUTPATIENT
Start: 2023-10-17 | End: 2023-10-17

## 2023-10-17 RX ORDER — POTASSIUM CHLORIDE 20 MEQ
40 PACKET (EA) ORAL ONCE
Refills: 0 | Status: COMPLETED | OUTPATIENT
Start: 2023-10-17 | End: 2023-10-17

## 2023-10-17 RX ADMIN — Medication 30 MILLILITER(S): at 00:06

## 2023-10-17 RX ADMIN — CARVEDILOL PHOSPHATE 6.25 MILLIGRAM(S): 80 CAPSULE, EXTENDED RELEASE ORAL at 05:46

## 2023-10-17 RX ADMIN — Medication 40 MILLIGRAM(S): at 05:46

## 2023-10-17 RX ADMIN — ENOXAPARIN SODIUM 40 MILLIGRAM(S): 100 INJECTION SUBCUTANEOUS at 05:46

## 2023-10-17 RX ADMIN — CHLORHEXIDINE GLUCONATE 1 APPLICATION(S): 213 SOLUTION TOPICAL at 06:50

## 2023-10-17 RX ADMIN — CARVEDILOL PHOSPHATE 6.25 MILLIGRAM(S): 80 CAPSULE, EXTENDED RELEASE ORAL at 18:11

## 2023-10-17 RX ADMIN — Medication 40 MILLIEQUIVALENT(S): at 18:11

## 2023-10-17 RX ADMIN — ATORVASTATIN CALCIUM 40 MILLIGRAM(S): 80 TABLET, FILM COATED ORAL at 21:24

## 2023-10-17 NOTE — OCCUPATIONAL THERAPY INITIAL EVALUATION ADULT - LOWER BODY DRESSING, ASSISTIVE DEVICE, OT EVAL
Pt educated on AE (sock aid) to increase I with LB dressing. Pt declined to use sock aid. Pt reports able to don socks from chair at home.

## 2023-10-17 NOTE — CONSULT NOTE ADULT - SUBJECTIVE AND OBJECTIVE BOX
ENT Consult Note:   Pt is an 83 year old female w/ PMHx CHF, HTN, HLD and remote smoking hx a/w a CHF exacerbation, ENT called for b/l nasal bone fracture seen on CT. Patient seen and examined at bedside, pt in NAD and without active bleeding or exposed nasal bone at this time. Patient states that her daughter passed away 3 weeks ago and is undergoing a lot of emotional stress and noted that around that time pt sustained a mechanical fall and presented to the hospital for a nose bleed which was difficult to control. Patient notes that she again a few days ago sustained another fall with head and LLE trauma and again hurt her nose, this time without any nasal bleeding.  Prior to the admission pt presented to an [facility] where she was found to be hypoxic in the mid 80s on RA. Patient is not on home O2. She admits to some lightheadedness, dry cough, leg swelling, left leg pain and nose pain, but denies palpitations, chest pain, headaches, fever or chills.    [ x ] A 10 Point Review of Systems was negative except where noted    Allergies: No Known Allergies    MEDICATIONS  (STANDING):  atorvastatin 40 milliGRAM(s) Oral at bedtime  carvedilol 6.25 milliGRAM(s) Oral every 12 hours  chlorhexidine 2% Cloths 1 Application(s) Topical <User Schedule>  enoxaparin Injectable 40 milliGRAM(s) SubCutaneous every 24 hours  furosemide   Injectable 40 milliGRAM(s) IV Push daily    MEDICATIONS  (PRN):  acetaminophen     Tablet .. 650 milliGRAM(s) Oral every 6 hours PRN Temp greater or equal to 38C (100.4F), Mild Pain (1 - 3)  aluminum hydroxide/magnesium hydroxide/simethicone Suspension 30 milliLiter(s) Oral every 4 hours PRN Dyspepsia  diazepam    Tablet 5 milliGRAM(s) Oral at bedtime PRN for insomnia  ondansetron Injectable 4 milliGRAM(s) IV Push every 8 hours PRN Nausea and/or Vomiting    Vital Signs Last 24 Hrs  T(C): 36.7 (17 Oct 2023 04:25), Max: 37.1 (17 Oct 2023 00:21)  T(F): 98.1 (17 Oct 2023 04:25), Max: 98.8 (17 Oct 2023 00:21)  HR: 75 (17 Oct 2023 04:25) (75 - 86)  BP: 101/69 (17 Oct 2023 04:25) (101/69 - 123/84)  RR: 18 (17 Oct 2023 04:25) (18 - 19)  SpO2: 96% (17 Oct 2023 04:25) (89% - 100%)    Parameters below as of 17 Oct 2023 00:21  Patient On (Oxygen Delivery Method): nasal cannula  O2 Flow (L/min): 5    PHYSICAL EXAM:  Gen: Well-developed, well-nourished, NAD.  No drooling or pooling of secretions.  Good vocal quality, no hoarseness  Skin: Good color, non diaphoretic  Head: NC/AT.   EENT: +lateral angulation and displacement of entire nose to the right. +minimal ttp to nose. +saddle deformity noted to nasal bridge. Nares bilaterally patent, no blood or discharge noted. Oral cavity no erythema/edema. Tongue wnl, uvula midline, no tenderness throughout FOM. Posterior oropharynx clear, no blood/discharge noted.   Neck: Trachea midline, supple  Resp: Breathing easily, no accessory muscle use, no stridor or stertor.    Cardio: +S1/S2  Abd: Soft, nontender, nondistended  Neuro: Awake and alert  Psych: Normal mood, normal affect  Ext: No peripheral edema/cyanosis, KING x 4    LABS:                        13.2   6.92  )-----------( 242      ( 16 Oct 2023 06:42 )             42.1     10-16    142  |  100  |  21<H>  ----------------------------<  86  3.9   |  27  |  1.1    Ca    8.7      16 Oct 2023 06:42  Mg     1.9     10-16    TPro  6.7  /  Alb  4.2  /  TBili  1.1  /  DBili  x   /  AST  20  /  ALT  24  /  AlkPhos  50  10-16      Urinalysis Basic - ( 16 Oct 2023 06:42 )  Color: x / Appearance: x / SG: x / pH: x  Gluc: 86 mg/dL / Ketone: x  / Bili: x / Urobili: x   Blood: x / Protein: x / Nitrite: x   Leuk Esterase: x / RBC: x / WBC x   Sq Epi: x / Non Sq Epi: x / Bacteria: x      IMAGING/ADDITIONAL STUDIES:     ACC: [id number] EXAM:  CT MAXILLOFACIAL   ORDERED BY: TASHA COBIAN     ACC: [id number] EXAM:  CT CERVICAL SPINE   ORDERED BY: TASHA COBIAN     ACC: [id number] EXAM:  CT BRAIN   ORDERED BY: TASHA COBIAN     PROCEDURE DATE:  10/15/2023      INTERPRETATION:  CLINICAL INDICATIONS:  Fall with head strike.    TECHNIQUE:  Noncontrast head, maxillofacial and cervical spine CT was   performed.    Multiple contiguous axial images were obtained from the skull base to the   vertex without the use of intravenous contrast. Reformatted coronal and   sagittal images were were subsequently obtained and reviewed.    Axial 1.3 mm sections from the frontal sinuses through the maxilla.  The   study was supplemented with coronal and sagittal reformatted images.   Intravenous contrast was not administered    Axial images were obtained through the cervical spine using multislice   helical technique.  Reformatted coronal and sagittal images were were   subsequently obtained and reviewed.    COMPARISON: None.    FINDINGS:    CT BRAIN:    There is no evidence for acute intracranial hemorrhage, mass effect or   midline shift.    There is periventricular and scattered subcortical white matter   hypodensity.    The basal cisterns are patent. There is no hydrocephalus.    There is no extra-axial collection.    The calvarium is intact, without depressed fracture. The mastoid air   cells are clear.    MAXILLOFACIAL BONES:    Heart palate there are bilateral nasal fractures which are displaced and   deviated to the right. Origins    The visualized sinuses demonstrate no evidence of opacification or   mucosal thickening.    The soft tissues are grossly unremarkable.    The orbits are unremarkable. The extraocular muscles, optic nerves and   retrobulbar fat are unremarkable.    The visualized neck soft tissues are unremarkable.      CT CERVICAL SPINE:    There is no prevertebral soft tissue swelling. There is no splaying of   the spinous processes. The occipital condyles are normal. Lateral masses   of C1 align normally with C2. No lucent fracture line is identified.   There is no spondylolisthesis.    Multilevel degenerative changes are present. Findings include   uncovertebral spurring, loss of normal disc space height, endplate   sclerosis, and facet joint arthrosis.    Spinal canal contents are grossly unremarkable though suboptimally   evaluated inherent to CT technique.    The visualized lung apices are within normal limits.    Miscellaneous:  None.    IMPRESSION:    CT HEAD:  No acute intracranial pathology or hemorrhage. No acute calvarial   fracture.    Mild chronic microvascular type changes.    MAXILLOFACIAL BONES:  Bilateral nasal bone fractures are noted with displacement and deviation   to the right..    CT CERVICAL SPINE:  No acute fracture or subluxation.    --- End of Report --    LIBBY SNYDER MD; Attending Radiologist  This document has been electronically signed. Oct 15 2023  1:44PM

## 2023-10-17 NOTE — CONSULT NOTE ADULT - ASSESSMENT
84 y/o F with PMHx of HTN, HLD, CHF (Undefined CM) presented with SOB/CARUSO X 3 days. Pt has DNS and chronic nasal congestion however noticed worsening post nasal drip with associated cough, minimal sputum production and generalized weakness. Of note pt has not been taking her home meds, including lasix and did not watch salt intake in her diet. She follows with cardiologist in [place] Dr Rodriguez and was told that she has a weak heart. Does not remember getting cath or NST however had a bunch of w/up done per pt.    IMPRESSION  Acute HF exacerbation  Undefined CM  URI, Entero-rhinovirus +  HTN, HLD    RECOMMENDATIONS  telemonitoring  strict Is and Os, daily weight, fluid restriction, monitor lytes/BUN/Cret  c/w IV Lasix BID for today, switch to qd from tomorrow and then resume PO when euvolemic  c/w Coreg and and Lisinopril  c/w lipitor  f/u TTE   get records from primary cardiologist  management of URI, possible bronchitis per primary team  
Pt is an 83 year old female w/ PMHx CHF, HTN, HLD and remote smoking hx a/w a CHF exacerbation, ENT called for b/l nasal bone fracture s/p fall 3 weeks and a few days ago. Patient without active bleeding or exposed nasal bone at this time.    Plan:  - No acute ENT/surgical intervention indicated at this time  - No exposed bone or active bleeding noted throughout exam  - Recommend outpatient f/u with ENT for further management of nasal bone fracture  - Will discuss with attending

## 2023-10-17 NOTE — PROGRESS NOTE ADULT - SUBJECTIVE AND OBJECTIVE BOX
Brief Interval History  Patient is a 83y old Female who presents with a chief complaint of CHF Exacerbation (17 Oct 2023 06:02)    URIEL MCDANIEL is admitted with a primary diagnosis of Acute CHF    Today is Hospital Day 2: Patient states that she is mildly labored in her breathing.    Admission HPI  HPI:  83F w/ PMHx CHF, HTN, HLD and remote smoking hx presents to the ED with shortness of breath. Patient is visiting family from [place] and forgot her medications for the past few days. Prior to the ED, she was at urgent care where she was found to be hypoxic in the mid 80s on RA. Patient is not on home O2. She admits to some lightheadedness, dry cough, leg swelling, left leg pain and nose pain, but denies palpitations, chest pain, headaches, fever, chills, sore throat, abdominal pain, nausea, vomiting, diarrhea or dysuria. Of note, patient's daughter passed away 2 weeks ago and is undergoing a lot of emotional stress. She also had a mechanical fall a few days ago with head and LLE trauma.    Vitals: Temp 99F, /99, HR 93, RR 19, SpO2 96% on 3L NC    Labs: K 5.1, Trop 0.02, BNP 71332 (unknown baseline), D-dimer 474    Imaging:  - CT Head/Maxillofacial/Neck: Bilateral nasal bone fractures are noted with displacement and deviation to the right, otherwise no other acute intracranial pathology.  - CTA Chest: No PE noted. Small bilateral pleural effusions with compressive atelectasis. No consolidations. Reflux of contrast into the inferior vena cava and hepatic veins. Small bilateral pleural effusions and mild mosaic lung attenuation. Findings are compatible with right heart failure.    EKG showed sinus rhythm w/ PACs and PVCs    In the ED:  - s/p Lasix 40mg IV x2  - Duonebs x1  - Decadron 6mg IV x1    Admitted to medicine/telemetry for further management (15 Oct 2023 21:22)      Code Status  Full Code    Past Medical and Surgical History    Social History  Social History:      Allergies  No Known Allergies    Medications  Standing Medications  atorvastatin 40 milliGRAM(s) Oral at bedtime  carvedilol 6.25 milliGRAM(s) Oral every 12 hours  chlorhexidine 2% Cloths 1 Application(s) Topical <User Schedule>  enoxaparin Injectable 40 milliGRAM(s) SubCutaneous every 24 hours  furosemide   Injectable 40 milliGRAM(s) IV Push every 12 hours  potassium chloride    Tablet ER 40 milliEquivalent(s) Oral once    PRN Medications  acetaminophen     Tablet .. 650 milliGRAM(s) Oral every 6 hours PRN  aluminum hydroxide/magnesium hydroxide/simethicone Suspension 30 milliLiter(s) Oral every 4 hours PRN  diazepam    Tablet 5 milliGRAM(s) Oral at bedtime PRN  ondansetron Injectable 4 milliGRAM(s) IV Push every 8 hours PRN    Vitals  T(F): 98.1  HR: 75  BP: 101/69  RR: 18  SpO2: 96%    I&O's    10-16-23 @ 07:01  -  10-17-23 @ 07:00  --------------------------------------------------------  IN: 120 mL / OUT: 225 mL / NET: -105 mL    10-17-23 @ 07:01  -  10-17-23 @ 12:46  --------------------------------------------------------  IN: 210 mL / OUT: 0 mL / NET: 210 mL        Review of Systems  Constitutional: No weakness, fevers, or chills  Eyes/ENT: No visual changes. No vertigo or throat pain   Neck: No pain or stiffness  Respiratory: Mild intermittent "labored breathing"  Cardiovascular: No chest pain or palpitations  Gastrointestinal: No abdominal or epigastric pain. No nausea, vomiting, or hematemesis. No diarrhea or constipation. No melena or hematochezia  Genitourinary: No dysuria, frequency, or hematuria  Musculoskeletal: FROM all extremities, normal strength, no calf tenderness  Neurological: No numbness or weakness  Skin: No itching or rashes    Physical Examination  General: NAD, lying in bed comfortably  Head: NCAT  Neck: Supple, no JVD  Cardiac: Regular rate and rhythm. No murmurs, gallops, or rubs  Pulmonary: On 3L NC, lungs clear to auscultation  Abdominal: Soft, nontender, and nondistended with positive bowel sounds  Extremities: Mild increase in edema   Neurologic: AOx3, nonfocal  Skin: No rashes or lesions    Tubes, Lines, and Drains    Labs                        13.2   5.40  )-----------( 229      ( 17 Oct 2023 05:41 )             42.0     10-17    139  |  101  |  28<H>  ----------------------------<  90  3.6   |  28  |  1.1    Ca    8.6      17 Oct 2023 05:41  Mg     2.1     10-17    TPro  6.2  /  Alb  4.0  /  TBili  0.6  /  DBili  x   /  AST  19  /  ALT  24  /  AlkPhos  47  10-17      Urinalysis Basic - ( 17 Oct 2023 05:41 )    Color: x / Appearance: x / SG: x / pH: x  Gluc: 90 mg/dL / Ketone: x  / Bili: x / Urobili: x   Blood: x / Protein: x / Nitrite: x   Leuk Esterase: x / RBC: x / WBC x   Sq Epi: x / Non Sq Epi: x / Bacteria: x            CARDIAC MARKERS ( 16 Oct 2023 06:42 )  x     / 0.03 ng/mL / x     / x     / x      CARDIAC MARKERS ( 16 Oct 2023 01:15 )  x     / 0.03 ng/mL / x     / x     / x      CARDIAC MARKERS ( 15 Oct 2023 14:35 )  x     / 0.02 ng/mL / x     / x     / x            Radiology  CXR  Xray Chest 1 View- PORTABLE-Urgent:   ACC: [id number] EXAM:  XR CHEST PORTABLE URGENT 1V   ORDERED BY: TASHA COBIAN     PROCEDURE DATE:  10/15/2023          INTERPRETATION:  Clinical History / Reason for exam: Chest Pain    Comparison : Chest radiograph: None    Technique/Positioning:Frontal view of the chest    Findings:    Support devices: None.    Cardiac/mediastinum/hilum: The cardiac silhouette is enlarged.    Skeleton/soft tissues: No evidence of acute osseous abnormality.    Lung parenchyma/Pleura: Bibasilar opacities. No evidence of pneumothorax.    Impression:  Bibasilar opacities.            --- End of Report ---            ROSSI MONTAÑO MD; Attending Radiologist  This document has been electronically signed. Oct 16 2023 12:35AM (10-15-23 @ 13:23)      CT  CT Angio Chest PE Protocol w/ IV Cont:   ACC: [id number] EXAM:  CT ANGIO CHEST PULM ART WAWIC   ORDERED BY: TASHA COBIAN     PROCEDURE DATE:  10/15/2023          INTERPRETATION:  CLINICAL HISTORY / REASON FOR EXAM: Shortness of breath    TECHNIQUE: Multislice helical sections were obtained from the thoracic   inlet to the lung bases during rapid administration of 75 mL Omnipaque   350 intravenous contrast using a CTA pulmonary embolism protocol, 25 mL   discarded. Thin sections were reconstructed through the pulmonary   vasculature. Coronal, sagittal and 3D/MIP reformatted images are also   submitted.    COMPARISON CT: None.    FINDINGS:    PULMONARY EMBOLUS: No central or segmental pulmonary embolus.    TUBES/LINES: None.    LUNGS, PLEURA, AIRWAYS: Small bilateral pleural effusions with   compressive atelectasis No pneumothorax. No consolidation. Central   airways patent.    THORACIC NODES: No mediastinal or axillary lymphadenopathy.    MEDIASTINUM/GREAT VESSELS: Reflux of contrast into the inferior vena cava   and hepatic veins. No pericardial effusion. Heart size unremarkable.   Multivessel coronary artery calcifications. No aneurysmal dilation of the   thoracic aorta.    VISUALIZED UPPER ABDOMEN: Unremarkable.    BONES/SOFT TISSUES: Degenerative changes of the thoracic spine.      IMPRESSION:    No CTA evidence of acute pulmonary embolus.    Reflux of contrast into the inferior vena cava and hepatic veins. Small   bilateral pleural effusions and mild mosaic lung attenuation. Findings   are compatible with right heart failure.    --- End of Report ---          RACHEL HEART MD; Resident Radiologist  This document has been electronically signed.  ESTELA EDWARDS MD; Attending Radiologist  This document has been electronically signed. Oct 15 2023  8:36PM (10-15-23 @ 17:10)   Brief Interval History  Patient is a 83y old Female who presents with a chief complaint of CHF Exacerbation (17 Oct 2023 06:02)    URIEL MCDANIEL is admitted with a primary diagnosis of Acute CHF    Today is Hospital Day 2: Patient states that she is mildly labored in her breathing.    Admission HPI  HPI:  83F w/ PMHx CHF, HTN, HLD and remote smoking hx presents to the ED with shortness of breath. Patient is visiting family from [place] and forgot her medications for the past few days. Prior to the ED, she was at urgent care where she was found to be hypoxic in the mid 80s on RA. Patient is not on home O2. She admits to some lightheadedness, dry cough, leg swelling, left leg pain and nose pain, but denies palpitations, chest pain, headaches, fever, chills, sore throat, abdominal pain, nausea, vomiting, diarrhea or dysuria. Of note, patient's daughter passed away 2 weeks ago and is undergoing a lot of emotional stress. She also had a mechanical fall a few days ago with head and LLE trauma.    Vitals: Temp 99F, /99, HR 93, RR 19, SpO2 96% on 3L NC    Labs: K 5.1, Trop 0.02, BNP 61537 (unknown baseline), D-dimer 474    Imaging:  - CT Head/Maxillofacial/Neck: Bilateral nasal bone fractures are noted with displacement and deviation to the right, otherwise no other acute intracranial pathology.  - CTA Chest: No PE noted. Small bilateral pleural effusions with compressive atelectasis. No consolidations. Reflux of contrast into the inferior vena cava and hepatic veins. Small bilateral pleural effusions and mild mosaic lung attenuation. Findings are compatible with right heart failure.    EKG showed sinus rhythm w/ PACs and PVCs    In the ED:  - s/p Lasix 40mg IV x2  - Duonebs x1  - Decadron 6mg IV x1    Admitted to medicine/telemetry for further management (15 Oct 2023 21:22)      Code Status  Full Code    Past Medical and Surgical History    Social History  Social History:      Allergies  No Known Allergies    Medications  Standing Medications  atorvastatin 40 milliGRAM(s) Oral at bedtime  carvedilol 6.25 milliGRAM(s) Oral every 12 hours  chlorhexidine 2% Cloths 1 Application(s) Topical <User Schedule>  enoxaparin Injectable 40 milliGRAM(s) SubCutaneous every 24 hours  furosemide   Injectable 40 milliGRAM(s) IV Push every 12 hours  potassium chloride    Tablet ER 40 milliEquivalent(s) Oral once    PRN Medications  acetaminophen     Tablet .. 650 milliGRAM(s) Oral every 6 hours PRN  aluminum hydroxide/magnesium hydroxide/simethicone Suspension 30 milliLiter(s) Oral every 4 hours PRN  diazepam    Tablet 5 milliGRAM(s) Oral at bedtime PRN  ondansetron Injectable 4 milliGRAM(s) IV Push every 8 hours PRN    Vitals  T(F): 98.1  HR: 75  BP: 101/69  RR: 18  SpO2: 96%    I&O's    10-16-23 @ 07:01  -  10-17-23 @ 07:00  --------------------------------------------------------  IN: 120 mL / OUT: 225 mL / NET: -105 mL    10-17-23 @ 07:01  -  10-17-23 @ 12:46  --------------------------------------------------------  IN: 210 mL / OUT: 0 mL / NET: 210 mL        Review of Systems  Constitutional: No weakness, fevers, or chills  Eyes/ENT: No visual changes. No vertigo or throat pain   Neck: No pain or stiffness  Respiratory: Mild intermittent "labored breathing"  Cardiovascular: No chest pain or palpitations  Gastrointestinal: No abdominal or epigastric pain. No nausea, vomiting, or hematemesis. No diarrhea or constipation. No melena or hematochezia  Genitourinary: No dysuria, frequency, or hematuria  Musculoskeletal: FROM all extremities, normal strength, no calf tenderness  Neurological: No numbness or weakness  Skin: No itching or rashes    Physical Examination  General: NAD, lying in bed comfortably  Head: NCAT  Neck: Supple, no JVD  Cardiac: Regular rate and rhythm. No murmurs, gallops, or rubs  Pulmonary: On 3L NC, lungs clear to auscultation  Abdominal: Soft, nontender, and nondistended with positive bowel sounds  Extremities: Trace edema in b/l LLE, L>R  Neurologic: AOx3, nonfocal  Skin: No rashes or lesions    Tubes, Lines, and Drains    Labs                        13.2   5.40  )-----------( 229      ( 17 Oct 2023 05:41 )             42.0     10-17    139  |  101  |  28<H>  ----------------------------<  90  3.6   |  28  |  1.1    Ca    8.6      17 Oct 2023 05:41  Mg     2.1     10-17    TPro  6.2  /  Alb  4.0  /  TBili  0.6  /  DBili  x   /  AST  19  /  ALT  24  /  AlkPhos  47  10-17      Urinalysis Basic - ( 17 Oct 2023 05:41 )    Color: x / Appearance: x / SG: x / pH: x  Gluc: 90 mg/dL / Ketone: x  / Bili: x / Urobili: x   Blood: x / Protein: x / Nitrite: x   Leuk Esterase: x / RBC: x / WBC x   Sq Epi: x / Non Sq Epi: x / Bacteria: x            CARDIAC MARKERS ( 16 Oct 2023 06:42 )  x     / 0.03 ng/mL / x     / x     / x      CARDIAC MARKERS ( 16 Oct 2023 01:15 )  x     / 0.03 ng/mL / x     / x     / x      CARDIAC MARKERS ( 15 Oct 2023 14:35 )  x     / 0.02 ng/mL / x     / x     / x            Radiology  CXR  Xray Chest 1 View- PORTABLE-Urgent:   ACC: [id number] EXAM:  XR CHEST PORTABLE URGENT 1V   ORDERED BY: TASHA COBIAN     PROCEDURE DATE:  10/15/2023          INTERPRETATION:  Clinical History / Reason for exam: Chest Pain    Comparison : Chest radiograph: None    Technique/Positioning:Frontal view of the chest    Findings:    Support devices: None.    Cardiac/mediastinum/hilum: The cardiac silhouette is enlarged.    Skeleton/soft tissues: No evidence of acute osseous abnormality.    Lung parenchyma/Pleura: Bibasilar opacities. No evidence of pneumothorax.    Impression:  Bibasilar opacities.            --- End of Report ---            ROSSI MONTAÑO MD; Attending Radiologist  This document has been electronically signed. Oct 16 2023 12:35AM (10-15-23 @ 13:23)      CT  CT Angio Chest PE Protocol w/ IV Cont:   ACC: [id number] EXAM:  CT ANGIO CHEST PULM ART WAWIC   ORDERED BY: TASHA COBIAN     PROCEDURE DATE:  10/15/2023          INTERPRETATION:  CLINICAL HISTORY / REASON FOR EXAM: Shortness of breath    TECHNIQUE: Multislice helical sections were obtained from the thoracic   inlet to the lung bases during rapid administration of 75 mL Omnipaque   350 intravenous contrast using a CTA pulmonary embolism protocol, 25 mL   discarded. Thin sections were reconstructed through the pulmonary   vasculature. Coronal, sagittal and 3D/MIP reformatted images are also   submitted.    COMPARISON CT: None.    FINDINGS:    PULMONARY EMBOLUS: No central or segmental pulmonary embolus.    TUBES/LINES: None.    LUNGS, PLEURA, AIRWAYS: Small bilateral pleural effusions with   compressive atelectasis No pneumothorax. No consolidation. Central   airways patent.    THORACIC NODES: No mediastinal or axillary lymphadenopathy.    MEDIASTINUM/GREAT VESSELS: Reflux of contrast into the inferior vena cava   and hepatic veins. No pericardial effusion. Heart size unremarkable.   Multivessel coronary artery calcifications. No aneurysmal dilation of the   thoracic aorta.    VISUALIZED UPPER ABDOMEN: Unremarkable.    BONES/SOFT TISSUES: Degenerative changes of the thoracic spine.      IMPRESSION:    No CTA evidence of acute pulmonary embolus.    Reflux of contrast into the inferior vena cava and hepatic veins. Small   bilateral pleural effusions and mild mosaic lung attenuation. Findings   are compatible with right heart failure.    --- End of Report ---          RACHEL HEART MD; Resident Radiologist  This document has been electronically signed.  ESTELA EDWARDS MD; Attending Radiologist  This document has been electronically signed. Oct 15 2023  8:36PM (10-15-23 @ 17:10)

## 2023-10-17 NOTE — DISCHARGE NOTE NURSING/CASE MANAGEMENT/SOCIAL WORK - NSDCPEFALRISK_GEN_ALL_CORE
For information on Fall & Injury Prevention, visit: [email or web address] OR  [email or web address] OR  https://www.cdc.gov/steadi/patient.html

## 2023-10-17 NOTE — OCCUPATIONAL THERAPY INITIAL EVALUATION ADULT - ADDITIONAL COMMENTS
Pt reports was independent with ADLS and homemaking. Pt son would often take pt shopping. Pt does not drive.

## 2023-10-17 NOTE — OCCUPATIONAL THERAPY INITIAL EVALUATION ADULT - DIAGNOSIS, OT EVAL
Debility 2* CHF exacerbation/B/L nasal bone fx with displacement
Debility 2* Acute hypoxic respiratory failure likely 2* CHF exacerbation, nasal fracture

## 2023-10-17 NOTE — OCCUPATIONAL THERAPY INITIAL EVALUATION ADULT - NSOTDMEREC_GEN_A_CORE
Pt educated on benefit of shower chair for increase safety with bathing. Pt declined to use shower chair

## 2023-10-17 NOTE — CONSULT NOTE ADULT - NS ATTEND AMEND GEN_ALL_CORE FT
Patient seen and examined at bedside.     I reviewed, interpreted and discussed CT images showing a mildly displaced nasal bone fracture.    I discussed with patient and her daughter options of closed reduction, that needs to be done within 3-4weeks from accident, otherwise the nose heals in a displaced position.    Patient admits her breathing has not changed much after the recent fall.    Will follow.

## 2023-10-17 NOTE — OCCUPATIONAL THERAPY INITIAL EVALUATION ADULT - PATIENT PROFILE REVIEW, REHAB EVAL
[id number] Pt chart thoroughly reviewed prior to OT evaluation./yes
Chart thoroughly reviewed prior to IE/yes

## 2023-10-17 NOTE — OCCUPATIONAL THERAPY INITIAL EVALUATION ADULT - NSOTDISCHREC_GEN_A_CORE
Pt requires supervision with ADLS. OT recommends d/c to home pending progress when medically stable. Refer to IE for details.

## 2023-10-17 NOTE — OCCUPATIONAL THERAPY INITIAL EVALUATION ADULT - PERTINENT HX OF CURRENT PROBLEM, REHAB EVAL
83F w/ PMHx CHF, HTN, HLD and remote smoking hx presents to the ED with shortness of breath. Patient is visiting family from [place] and forgot her medications for the past few days. Prior to the ED, she was at urgent care where she was found to be hypoxic in the mid 80s on RA. Patient is not on home O2. She admits to some lightheadedness, dry cough, leg swelling, left leg pain and nose pain, but denies palpitations, chest pain, headaches, fever, chills, sore throat, abdominal pain, nausea, vomiting, diarrhea or dysuria. Of note, patient's daughter passed away 2 weeks ago and is undergoing a lot of emotional stress. She also had a mechanical fall a few days ago with head and LLE trauma.
83F w/ PMHx CHF, HTN, HLD and remote smoking hx presents to the ED with shortness of breath. Patient is visiting family from [place] and forgot her medications for the past few days. Prior to the ED, she was at urgent care where she was found to be hypoxic in the mid 80s on RA. Patient is not on home O2. She admits to some lightheadedness, dry cough, leg swelling, left leg pain and nose pain, but denies palpitations, chest pain, headaches, fever, chills, sore throat, abdominal pain, nausea, vomiting, diarrhea or dysuria. Of note, patient's daughter passed away 2 weeks ago and is undergoing a lot of emotional stress. She also had a mechanical fall a few days ago with head and LLE trauma. There is no evidence for acute intracranial hemorrhage, mass effect or midline shift in brain. +nasal fracture

## 2023-10-17 NOTE — PROGRESS NOTE ADULT - SUBJECTIVE AND OBJECTIVE BOX
JONAS URIEL  83y, Female  Allergy: No Known Allergies    Hospital Day: 1d    Patient seen and examined earlier today. Patient with no major complaints  she does not use oxygen at home.  no acute overnight events.     PMH/PSH:  PAST MEDICAL & SURGICAL HISTORY:      LAST 24-Hr EVENTS:    VITALS:  T(F): 98.1 (10-16-23 @ 15:48), Max: 98.8 (10-16-23 @ 05:23)  HR: 79 (10-16-23 @ 15:48)  BP: 113/79 (10-16-23 @ 15:48) (112/76 - 150/78)  RR: 18 (10-16-23 @ 15:48)  SpO2: 99% (10-16-23 @ 15:48)          TESTS & MEASUREMENTS:  Weight/BMI      LABS:                        13.2   5.40  )-----------( 229      ( 17 Oct 2023 05:41 )             42.0     10-17    139  |  101  |  28<H>  ----------------------------<  90  3.6   |  28  |  1.1    Ca    8.6      17 Oct 2023 05:41  Mg     2.1     10-17    TPro  6.2  /  Alb  4.0  /  TBili  0.6  /  DBili  x   /  AST  19  /  ALT  24  /  AlkPhos  47  10-17                CARDIAC MARKERS ( 16 Oct 2023 06:42 )  x     / 0.03 ng/mL / x     / x     / x      CARDIAC MARKERS ( 16 Oct 2023 01:15 )  x     / 0.03 ng/mL / x     / x     / x      CARDIAC MARKERS ( 15 Oct 2023 14:35 )  x     / 0.02 ng/mL / x     / x     / x            Urinalysis Basic - ( 16 Oct 2023 06:42 )    Color: x / Appearance: x / SG: x / pH: x  Gluc: 86 mg/dL / Ketone: x  / Bili: x / Urobili: x   Blood: x / Protein: x / Nitrite: x   Leuk Esterase: x / RBC: x / WBC x   Sq Epi: x / Non Sq Epi: x / Bacteria: x        D-Dimer Assay, Quantitative: 474 ng/mL DDU (10-15-23 @ 14:35)              A1C with Estimated Average Glucose Result: 5.8 % (10-16-23 @ 06:42)          RADIOLOGY, ECG, & ADDITIONAL TESTS:  12 Lead ECG:   Ventricular Rate 94 BPM    Atrial Rate 94 BPM    P-R Interval 178 ms    QRS Duration 90 ms    Q-T Interval 390 ms    QTC Calculation(Bazett) 487 ms    P Axis 78 degrees    R Axis 106 degrees    T Axis 37 degrees    Diagnosis Line Sinus rhythm with occasional Premature ventricular complexes and Premature  atrial complexes  Rightward axis  Nonspecific T wave abnormality  Prolonged QT  Abnormal ECG    Confirmed by Efrem De Paz ([id number]) on 10/15/2023 1:52:05 PM (10-15-23 @ 11:26)    CT Angio Chest PE Protocol w/ IV Cont:   ACC: [id number] EXAM:  CT ANGIO CHEST PULM ART WAWIC   ORDERED BY: TASHA COBIAN     PROCEDURE DATE:  10/15/2023          INTERPRETATION:  CLINICAL HISTORY / REASON FOR EXAM: Shortness of breath    TECHNIQUE: Multislice helical sections were obtained from the thoracic   inlet to the lung bases during rapid administration of 75 mL Omnipaque   350 intravenous contrast using a CTA pulmonary embolism protocol, 25 mL   discarded. Thin sections were reconstructed through the pulmonary   vasculature. Coronal, sagittal and 3D/MIP reformatted images are also   submitted.    COMPARISON CT: None.    FINDINGS:    PULMONARY EMBOLUS: No central or segmental pulmonary embolus.    TUBES/LINES: None.    LUNGS, PLEURA, AIRWAYS: Small bilateral pleural effusions with   compressive atelectasis No pneumothorax. No consolidation. Central   airways patent.    THORACIC NODES: No mediastinal or axillary lymphadenopathy.    MEDIASTINUM/GREAT VESSELS: Reflux of contrast into the inferior vena cava   and hepatic veins. No pericardial effusion. Heart size unremarkable.   Multivessel coronary artery calcifications. No aneurysmal dilation of the   thoracic aorta.    VISUALIZED UPPER ABDOMEN: Unremarkable.    BONES/SOFT TISSUES: Degenerative changes of the thoracic spine.      IMPRESSION:    No CTA evidence of acute pulmonary embolus.    Reflux of contrast into the inferior vena cava and hepatic veins. Small   bilateral pleural effusions and mild mosaic lung attenuation. Findings   are compatible with right heart failure.    --- End of Report ---          RACHEL HEART MD; Resident Radiologist  This document has been electronically signed.  ESTELA EDWARDS MD; Attending Radiologist  This document has been electronically signed. Oct 15 2023  8:36PM (10-15-23 @ 17:10)  CT Head No Cont:   ACC: [id number] EXAM:  CT MAXILLOFACIAL   ORDERED BY: TASHA COBIAN     ACC: [id number] EXAM:  CT CERVICAL SPINE   ORDERED BY: TASHA COBIAN     ACC: [id number] EXAM:  CT BRAIN   ORDERED BY: TASHA COBIAN     PROCEDURE DATE:  10/15/2023          INTERPRETATION:  CLINICAL INDICATIONS:  Fall with head strike.    TECHNIQUE:  Noncontrast head, maxillofacial and cervical spine CT was   performed.    Multiple contiguous axial images were obtained from the skull base to the   vertex without the use of intravenous contrast. Reformatted coronal and   sagittal images were were subsequently obtained and reviewed.    Axial 1.3 mm sections from the frontal sinuses through the maxilla.  The   study was supplemented with coronal and sagittal reformatted images.   Intravenous contrast was not administered    Axial images were obtained through the cervical spine using multislice   helical technique.  Reformatted coronal and sagittal images were were   subsequently obtained and reviewed.    COMPARISON: None.    FINDINGS:    CT BRAIN:    There is no evidence for acute intracranial hemorrhage, mass effect or   midline shift.    There is periventricular and scattered subcortical white matter   hypodensity.    The basal cisterns are patent. There is no hydrocephalus.    There is no extra-axial collection.    The calvarium is intact, without depressed fracture. The mastoid air   cells are clear.    MAXILLOFACIAL BONES:    Heart palate there are bilateral nasal fractures which are displaced and   deviated to the right. Origins    The visualized sinuses demonstrate no evidence of opacification or   mucosal thickening.    The soft tissues are grossly unremarkable.    The orbits are unremarkable. The extraocular muscles, optic nerves and   retrobulbar fat are unremarkable.    The visualized neck soft tissues are unremarkable.      CT CERVICAL SPINE:    There is no prevertebral soft tissue swelling. There is no splaying of   the spinous processes. The occipital condyles are normal. Lateral masses   of C1 align normally with C2. No lucent fracture line is identified.   There is no spondylolisthesis.    Multilevel degenerative changes are present. Findings include   uncovertebral spurring, loss of normal disc space height, endplate   sclerosis, and facet joint arthrosis.    Spinal canal contents are grossly unremarkable though suboptimally   evaluated inherent to CT technique.    The visualized lung apices are within normal limits.    Miscellaneous:  None.    IMPRESSION:    CT HEAD:  No acute intracranial pathology or hemorrhage. No acute calvarial   fracture.    Mild chronic microvascular type changes.    MAXILLOFACIAL BONES:  Bilateral nasal bone fractures are noted with displacement and deviation   to the right..    CT CERVICAL SPINE:  No acute fracture or subluxation.    --- End of Report ---            LIBBY SNYDER MD; Attending Radiologist  This document has been electronically signed. Oct 15 2023  1:44PM (10-15-23 @ 13:00)    RECENT DIAGNOSTIC ORDERS:  Diet, Regular:   DASH/TLC Sodium & Cholesterol Restricted  1500mL Fluid Restriction ([id number])  Low Sodium (10-15-23 @ 22:40)  TTE Echo Complete w/o Contrast w/ Doppler:   Transport: Stretcher-Crib  Monitor: w/o Monitor (10-15-23 @ 20:42)      MEDICATIONS:  MEDICATIONS  (STANDING):  atorvastatin 40 milliGRAM(s) Oral at bedtime  carvedilol 6.25 milliGRAM(s) Oral every 12 hours  enoxaparin Injectable 40 milliGRAM(s) SubCutaneous every 24 hours  furosemide   Injectable 40 milliGRAM(s) IV Push every 12 hours    MEDICATIONS  (PRN):  acetaminophen     Tablet .. 650 milliGRAM(s) Oral every 6 hours PRN Temp greater or equal to 38C (100.4F), Mild Pain (1 - 3)  aluminum hydroxide/magnesium hydroxide/simethicone Suspension 30 milliLiter(s) Oral every 4 hours PRN Dyspepsia  diazepam    Tablet 5 milliGRAM(s) Oral at bedtime PRN for insomnia  ondansetron Injectable 4 milliGRAM(s) IV Push every 8 hours PRN Nausea and/or Vomiting      HOME MEDICATIONS:  atorvastatin 40 mg oral tablet (10-15)  carvedilol 6.25 mg oral tablet (10-15)  diazePAM 5 mg oral tablet (10-15)  Lasix 40 mg oral tablet (10-15)  lisinopril 10 mg oral tablet (10-15)      PHYSICAL EXAM:  GEN: No acute distress. Awake, Alert and oriented x 3.   Head: Atraumatic, Normocephalic.   Eye: PEERLA. No sclera icterus. EOMI.   LUNGS: no audible wheezing   HEART: Normal. S1/S2 present. RRR. No murmur/gallops.   ABD: Soft, non-tender, non-distended. Bowel sounds present.   EXT: 1+ pitting edema B/L LE . No erythema. No tenderness.  Integumentary: No rash, No sore, No petechia.   NEURO: CN III-XII intact. Strength: 5/5 b/l ULE. Sensory intact b/l ULE.

## 2023-10-17 NOTE — DISCHARGE NOTE NURSING/CASE MANAGEMENT/SOCIAL WORK - PATIENT PORTAL LINK FT
You can access the FollowMyHealth Patient Portal offered by [facility] by registering at the following website: [email or web address]. By joining [facility]’s FollowMyHealth portal, you will also be able to view your health information using other applications (apps) compatible with our system.

## 2023-10-17 NOTE — OCCUPATIONAL THERAPY INITIAL EVALUATION ADULT - LIVES WITH, PROFILE
Pt reports lives in private home (1/2 bath on first floor and full bath on second floor). Pt was living with daughter who passed away a few weeks ago. Pt currently living [place]. Pt son lives in the basement

## 2023-10-18 LAB
ANION GAP SERPL CALC-SCNC: 14 MMOL/L — SIGNIFICANT CHANGE UP (ref 7–14)
ANION GAP SERPL CALC-SCNC: 14 MMOL/L — SIGNIFICANT CHANGE UP (ref 7–14)
ANION GAP SERPL CALC-SCNC: 17 MMOL/L — HIGH (ref 7–14)
ANION GAP SERPL CALC-SCNC: 17 MMOL/L — HIGH (ref 7–14)
BUN SERPL-MCNC: 37 MG/DL — HIGH (ref 10–20)
CALCIUM SERPL-MCNC: 8.9 MG/DL — SIGNIFICANT CHANGE UP (ref 8.4–10.4)
CALCIUM SERPL-MCNC: 8.9 MG/DL — SIGNIFICANT CHANGE UP (ref 8.4–10.4)
CALCIUM SERPL-MCNC: 8.9 MG/DL — SIGNIFICANT CHANGE UP (ref 8.4–10.5)
CALCIUM SERPL-MCNC: 8.9 MG/DL — SIGNIFICANT CHANGE UP (ref 8.4–10.5)
CHLORIDE SERPL-SCNC: 100 MMOL/L — SIGNIFICANT CHANGE UP (ref 98–110)
CHLORIDE SERPL-SCNC: 100 MMOL/L — SIGNIFICANT CHANGE UP (ref 98–110)
CHLORIDE SERPL-SCNC: 103 MMOL/L — SIGNIFICANT CHANGE UP (ref 98–110)
CHLORIDE SERPL-SCNC: 103 MMOL/L — SIGNIFICANT CHANGE UP (ref 98–110)
CO2 SERPL-SCNC: 23 MMOL/L — SIGNIFICANT CHANGE UP (ref 17–32)
CO2 SERPL-SCNC: 23 MMOL/L — SIGNIFICANT CHANGE UP (ref 17–32)
CO2 SERPL-SCNC: 24 MMOL/L — SIGNIFICANT CHANGE UP (ref 17–32)
CO2 SERPL-SCNC: 24 MMOL/L — SIGNIFICANT CHANGE UP (ref 17–32)
CREAT SERPL-MCNC: 1.2 MG/DL — SIGNIFICANT CHANGE UP (ref 0.7–1.5)
CREAT SERPL-MCNC: 1.2 MG/DL — SIGNIFICANT CHANGE UP (ref 0.7–1.5)
CREAT SERPL-MCNC: 1.5 MG/DL — SIGNIFICANT CHANGE UP (ref 0.7–1.5)
CREAT SERPL-MCNC: 1.5 MG/DL — SIGNIFICANT CHANGE UP (ref 0.7–1.5)
EGFR: 34 ML/MIN/1.73M2 — LOW
EGFR: 34 ML/MIN/1.73M2 — LOW
EGFR: 44 ML/MIN/1.73M2 — LOW
EGFR: 44 ML/MIN/1.73M2 — LOW
GLUCOSE SERPL-MCNC: 165 MG/DL — HIGH (ref 70–99)
GLUCOSE SERPL-MCNC: 165 MG/DL — HIGH (ref 70–99)
GLUCOSE SERPL-MCNC: 82 MG/DL — SIGNIFICANT CHANGE UP (ref 70–99)
GLUCOSE SERPL-MCNC: 82 MG/DL — SIGNIFICANT CHANGE UP (ref 70–99)
MAGNESIUM SERPL-MCNC: 2.3 MG/DL — SIGNIFICANT CHANGE UP (ref 1.8–2.4)
MAGNESIUM SERPL-MCNC: 2.3 MG/DL — SIGNIFICANT CHANGE UP (ref 1.8–2.4)
NT-PROBNP SERPL-SCNC: 1796 PG/ML — HIGH (ref 0–300)
NT-PROBNP SERPL-SCNC: 1796 PG/ML — HIGH (ref 0–300)
POTASSIUM SERPL-MCNC: 4.3 MMOL/L — SIGNIFICANT CHANGE UP (ref 3.5–5)
POTASSIUM SERPL-MCNC: 4.3 MMOL/L — SIGNIFICANT CHANGE UP (ref 3.5–5)
POTASSIUM SERPL-MCNC: 4.7 MMOL/L — SIGNIFICANT CHANGE UP (ref 3.5–5)
POTASSIUM SERPL-MCNC: 4.7 MMOL/L — SIGNIFICANT CHANGE UP (ref 3.5–5)
POTASSIUM SERPL-SCNC: 4.3 MMOL/L — SIGNIFICANT CHANGE UP (ref 3.5–5)
POTASSIUM SERPL-SCNC: 4.3 MMOL/L — SIGNIFICANT CHANGE UP (ref 3.5–5)
POTASSIUM SERPL-SCNC: 4.7 MMOL/L — SIGNIFICANT CHANGE UP (ref 3.5–5)
POTASSIUM SERPL-SCNC: 4.7 MMOL/L — SIGNIFICANT CHANGE UP (ref 3.5–5)
SODIUM SERPL-SCNC: 137 MMOL/L — SIGNIFICANT CHANGE UP (ref 135–146)
SODIUM SERPL-SCNC: 137 MMOL/L — SIGNIFICANT CHANGE UP (ref 135–146)
SODIUM SERPL-SCNC: 144 MMOL/L — SIGNIFICANT CHANGE UP (ref 135–146)
SODIUM SERPL-SCNC: 144 MMOL/L — SIGNIFICANT CHANGE UP (ref 135–146)

## 2023-10-18 PROCEDURE — 71045 X-RAY EXAM CHEST 1 VIEW: CPT | Mod: 26

## 2023-10-18 PROCEDURE — 99233 SBSQ HOSP IP/OBS HIGH 50: CPT

## 2023-10-18 RX ORDER — FUROSEMIDE 40 MG
40 TABLET ORAL DAILY
Refills: 0 | Status: DISCONTINUED | OUTPATIENT
Start: 2023-10-19 | End: 2023-10-19

## 2023-10-18 RX ADMIN — CHLORHEXIDINE GLUCONATE 1 APPLICATION(S): 213 SOLUTION TOPICAL at 06:14

## 2023-10-18 RX ADMIN — ATORVASTATIN CALCIUM 40 MILLIGRAM(S): 80 TABLET, FILM COATED ORAL at 21:41

## 2023-10-18 RX ADMIN — CARVEDILOL PHOSPHATE 6.25 MILLIGRAM(S): 80 CAPSULE, EXTENDED RELEASE ORAL at 06:35

## 2023-10-18 RX ADMIN — CARVEDILOL PHOSPHATE 6.25 MILLIGRAM(S): 80 CAPSULE, EXTENDED RELEASE ORAL at 18:20

## 2023-10-18 RX ADMIN — Medication 40 MILLIGRAM(S): at 06:34

## 2023-10-18 RX ADMIN — ENOXAPARIN SODIUM 40 MILLIGRAM(S): 100 INJECTION SUBCUTANEOUS at 06:34

## 2023-10-18 NOTE — PROGRESS NOTE ADULT - SUBJECTIVE AND OBJECTIVE BOX
Brief Interval History  Patient is a 83y old Female who presents with a chief complaint of CHF Exacerbation (17 Oct 2023 06:02)    URIEL MCDANIEL is admitted with a primary diagnosis of Acute CHF    Day 2: Patient states that she is mildly labored in her breathing    Today is hospital Day 3: Patient on 3L NC, states she feels comfortable on this amount of O2, will try to wean    Admission HPI  HPI:  83F w/ PMHx CHF, HTN, HLD and remote smoking hx presents to the ED with shortness of breath. Patient is visiting family from [place] and forgot her medications for the past few days. Prior to the ED, she was at urgent care where she was found to be hypoxic in the mid 80s on RA. Patient is not on home O2. She admits to some lightheadedness, dry cough, leg swelling, left leg pain and nose pain, but denies palpitations, chest pain, headaches, fever, chills, sore throat, abdominal pain, nausea, vomiting, diarrhea or dysuria. Of note, patient's daughter passed away 2 weeks ago and is undergoing a lot of emotional stress. She also had a mechanical fall a few days ago with head and LLE trauma.    Vitals: Temp 99F, /99, HR 93, RR 19, SpO2 96% on 3L NC    Labs: K 5.1, Trop 0.02, BNP 99309 (unknown baseline), D-dimer 474    Imaging:  - CT Head/Maxillofacial/Neck: Bilateral nasal bone fractures are noted with displacement and deviation to the right, otherwise no other acute intracranial pathology.  - CTA Chest: No PE noted. Small bilateral pleural effusions with compressive atelectasis. No consolidations. Reflux of contrast into the inferior vena cava and hepatic veins. Small bilateral pleural effusions and mild mosaic lung attenuation. Findings are compatible with right heart failure.    EKG showed sinus rhythm w/ PACs and PVCs    In the ED:  - s/p Lasix 40mg IV x2  - Duonebs x1  - Decadron 6mg IV x1    Admitted to medicine/telemetry for further management (15 Oct 2023 21:22)      Code Status  Full Code    Past Medical and Surgical History    Social History  Social History:      Allergies  No Known Allergies    Medications  Standing Medications  atorvastatin 40 milliGRAM(s) Oral at bedtime  carvedilol 6.25 milliGRAM(s) Oral every 12 hours  chlorhexidine 2% Cloths 1 Application(s) Topical <User Schedule>  enoxaparin Injectable 40 milliGRAM(s) SubCutaneous every 24 hours  furosemide   Injectable 40 milliGRAM(s) IV Push every 12 hours  potassium chloride    Tablet ER 40 milliEquivalent(s) Oral once    PRN Medications  acetaminophen     Tablet .. 650 milliGRAM(s) Oral every 6 hours PRN  aluminum hydroxide/magnesium hydroxide/simethicone Suspension 30 milliLiter(s) Oral every 4 hours PRN  diazepam    Tablet 5 milliGRAM(s) Oral at bedtime PRN  ondansetron Injectable 4 milliGRAM(s) IV Push every 8 hours PRN    Vitals  T(F): 98.1  HR: 75  BP: 101/69  RR: 18  SpO2: 96%    I&O's    10-16-23 @ 07:01  -  10-17-23 @ 07:00  --------------------------------------------------------  IN: 120 mL / OUT: 225 mL / NET: -105 mL    10-17-23 @ 07:01  -  10-17-23 @ 12:46  --------------------------------------------------------  IN: 210 mL / OUT: 0 mL / NET: 210 mL      Physical Examination  General: NAD, lying in bed comfortably  Head: NCAT  Neck: Supple, no JVD  Cardiac: Regular rate and rhythm. No murmurs, gallops, or rubs  Pulmonary: On 3L NC, lungs clear to auscultation  Abdominal: Soft, nontender, and nondistended with positive bowel sounds  Extremities: Trace edema in b/l LLE, L>R  Neurologic: AOx3, nonfocal  Skin: No rashes or lesions    Tubes, Lines, and Drains    Labs                        13.2   5.40  )-----------( 229      ( 17 Oct 2023 05:41 )             42.0     10-17    139  |  101  |  28<H>  ----------------------------<  90  3.6   |  28  |  1.1    Ca    8.6      17 Oct 2023 05:41  Mg     2.1     10-17    TPro  6.2  /  Alb  4.0  /  TBili  0.6  /  DBili  x   /  AST  19  /  ALT  24  /  AlkPhos  47  10-17      Urinalysis Basic - ( 17 Oct 2023 05:41 )    Color: x / Appearance: x / SG: x / pH: x  Gluc: 90 mg/dL / Ketone: x  / Bili: x / Urobili: x   Blood: x / Protein: x / Nitrite: x   Leuk Esterase: x / RBC: x / WBC x   Sq Epi: x / Non Sq Epi: x / Bacteria: x            CARDIAC MARKERS ( 16 Oct 2023 06:42 )  x     / 0.03 ng/mL / x     / x     / x      CARDIAC MARKERS ( 16 Oct 2023 01:15 )  x     / 0.03 ng/mL / x     / x     / x      CARDIAC MARKERS ( 15 Oct 2023 14:35 )  x     / 0.02 ng/mL / x     / x     / x            Radiology  CXR  Xray Chest 1 View- PORTABLE-Urgent:   ACC: [id number] EXAM:  XR CHEST PORTABLE URGENT 1V   ORDERED BY: TASHA COBIAN     PROCEDURE DATE:  10/15/2023          INTERPRETATION:  Clinical History / Reason for exam: Chest Pain    Comparison : Chest radiograph: None    Technique/Positioning:Frontal view of the chest    Findings:    Support devices: None.    Cardiac/mediastinum/hilum: The cardiac silhouette is enlarged.    Skeleton/soft tissues: No evidence of acute osseous abnormality.    Lung parenchyma/Pleura: Bibasilar opacities. No evidence of pneumothorax.    Impression:  Bibasilar opacities.            --- End of Report ---            ROSSI MONTAÑO MD; Attending Radiologist  This document has been electronically signed. Oct 16 2023 12:35AM (10-15-23 @ 13:23)      CT  CT Angio Chest PE Protocol w/ IV Cont:   ACC: [id number] EXAM:  CT ANGIO CHEST PULM ART WAWIC   ORDERED BY: TASHA COBIAN     PROCEDURE DATE:  10/15/2023          INTERPRETATION:  CLINICAL HISTORY / REASON FOR EXAM: Shortness of breath    TECHNIQUE: Multislice helical sections were obtained from the thoracic   inlet to the lung bases during rapid administration of 75 mL Omnipaque   350 intravenous contrast using a CTA pulmonary embolism protocol, 25 mL   discarded. Thin sections were reconstructed through the pulmonary   vasculature. Coronal, sagittal and 3D/MIP reformatted images are also   submitted.    COMPARISON CT: None.    FINDINGS:    PULMONARY EMBOLUS: No central or segmental pulmonary embolus.    TUBES/LINES: None.    LUNGS, PLEURA, AIRWAYS: Small bilateral pleural effusions with   compressive atelectasis No pneumothorax. No consolidation. Central   airways patent.    THORACIC NODES: No mediastinal or axillary lymphadenopathy.    MEDIASTINUM/GREAT VESSELS: Reflux of contrast into the inferior vena cava   and hepatic veins. No pericardial effusion. Heart size unremarkable.   Multivessel coronary artery calcifications. No aneurysmal dilation of the   thoracic aorta.    VISUALIZED UPPER ABDOMEN: Unremarkable.    BONES/SOFT TISSUES: Degenerative changes of the thoracic spine.      IMPRESSION:    No CTA evidence of acute pulmonary embolus.    Reflux of contrast into the inferior vena cava and hepatic veins. Small   bilateral pleural effusions and mild mosaic lung attenuation. Findings   are compatible with right heart failure.    --- End of Report ---          RACHEL HEART MD; Resident Radiologist  This document has been electronically signed.  ESTELA EDWARDS MD; Attending Radiologist  This document has been electronically signed. Oct 15 2023  8:36PM (10-15-23 @ 17:10)

## 2023-10-18 NOTE — PROGRESS NOTE ADULT - SUBJECTIVE AND OBJECTIVE BOX
CHIEF COMPLAINT:    Patient is a 83y old  Female who presents with a chief complaint of CHF Exacerbation     INTERVAL HPI/OVERNIGHT EVENTS:    Patient seen and examined at bedside. No acute overnight events occurred.    ROS: Denies SOB. All other systems are negative.    Medications:  Standing  atorvastatin 40 milliGRAM(s) Oral at bedtime  carvedilol 6.25 milliGRAM(s) Oral every 12 hours  chlorhexidine 2% Cloths 1 Application(s) Topical <User Schedule>  enoxaparin Injectable 40 milliGRAM(s) SubCutaneous every 24 hours    PRN Meds  acetaminophen     Tablet .. 650 milliGRAM(s) Oral every 6 hours PRN  aluminum hydroxide/magnesium hydroxide/simethicone Suspension 30 milliLiter(s) Oral every 4 hours PRN  diazepam    Tablet 5 milliGRAM(s) Oral at bedtime PRN  ondansetron Injectable 4 milliGRAM(s) IV Push every 8 hours PRN        Vital Signs:    T(F): 96.8 (10-18-23 @ 04:56), Max: 97.5 (10-17-23 @ 14:09)  HR: 83 (10-18-23 @ 04:56) (67 - 83)  BP: 99/56 (10-18-23 @ 04:56) (99/56 - 109/58)  RR: 18 (10-18-23 @ 04:56) (18 - 18)  SpO2: --  I&O's Summary    17 Oct 2023 07:01  -  18 Oct 2023 07:00  --------------------------------------------------------  IN: 686 mL / OUT: 0 mL / NET: 686 mL    18 Oct 2023 07:  -  18 Oct 2023 13:46  --------------------------------------------------------  IN: 230 mL / OUT: 0 mL / NET: 230 mL      Daily     Daily Weight in k.8 (18 Oct 2023 04:56)  CAPILLARY BLOOD GLUCOSE          PHYSICAL EXAM:  GENERAL:  NAD  SKIN: No rashes or lesions  HEENT: Atraumatic. Normocephalic. Anicteric  NECK:  No JVD.   PULMONARY: Clear to ausculation bilaterally. No wheezing. No rales  CVS: Normal S1, S2. Regular rate and rhythm. No murmurs.  ABDOMEN/GI: Soft, Nontender, Nondistended; Bowel sounds are present  EXTREMITIES:  trace edema B/L LE, right more than left  NEUROLOGIC:  No motor deficit.  PSYCH: Alert & oriented x 3, normal affect      LABS:                        13.2   5.40  )-----------( 229      ( 17 Oct 2023 05:41 )             42.0     10-    144  |  103  |  37<H>  ----------------------------<  82  4.7   |  24  |  1.2    Ca    8.9      18 Oct 2023 06:24  Mg     2.3     10-18    TPro  6.2  /  Alb  4.0  /  TBili  0.6  /  DBili  x   /  AST  19  /  ALT  24  /  AlkPhos  47  10-17        Trop 0.03, CKMB --, CK --, 10-16-23 @ 06:42  Trop 0.03, CKMB --, CK --, 10-16-23 @ 01:15  Trop 0.02, CKMB --, CK --, 10-15-23 @ 14:35        RADIOLOGY & ADDITIONAL TESTS:  Imaging or report Personally Reviewed:  [ ] YES  [ ] NO -->no new images    Telemetry reviewed independently - NSR, no acute events  EKG reviewed independently -->no new EKGs    Consultant(s) Notes Reviewed:  [ ] YES  [ ] NO  Care Discussed with Consultants/Other Providers [ ] YES  [ ] NO    Case discussed with resident  Care discussed with pt

## 2023-10-18 NOTE — PROGRESS NOTE ADULT - ASSESSMENT
82 yo F with hx of HTN, HLD, CHF presents to ED for SOB.     Acute hypoxic respiratory failure   likely 2/2 acute CHF exacerbation 2/2 medication non-compliance and rhino virus   - CTA chest: neg for acute PE. Reflux of contrast into the inferior vena cava and hepatic veins. Small bilateral pleural effusions and mild mosaic lung attenuation. -   - EKG: Sinus rhythm with PVC and PAC. Prolong QTc 487.   - proBNP 32186  - troponin 0.03. no chest pain during my interview   - s/p IV lasix 40mg x 2 in ED.   - changed to iv lasix daily.,   - check TTE and TSH, 3.3  - monitor daily weight, strict I & O, Low Na diet with fluid restriction  - monitor on Telemetry.   follow cardiology recommendations     Mild hyperkalemia , improved   - c/w IV lasix   - repeat BMP     Nasal bone fracture 2/2 mechanical fall   - pain control prn  - ENT consult. no intervention, OP follow up     HTN/HLD - c/w home med    DVT ppx: Lovenox SC  GI ppx: not indicated.   Diet: DASH diet with fluid restriction  Activity: as tolerated.       follow up: follow cardiology and ENT.   follow echo. monitor potassium 
82 yo F PMHx HTN, HLD, chronic HFpEF presented to ED for evaluation of SOB. Pt reports she missed several days of lasix. Unfortunately pt's daughter recently passed away. She went to stay with her other daughter and forgot her lasix at home. Pt had mechanical fall. Her daughter's stairs didn't have a railing, pt lost footing and fell.     Acute hypoxic respiratory failure  Likely due to acute on chronic HFpEF due to missed lasix  - CTA chest: neg for acute PE. Reflux of contrast into the inferior vena cava and hepatic veins. Small bilateral pleural effusions and mild mosaic lung attenuation. -   - EKG: Sinus rhythm with PVC and PAC. Prolong QTc 487.   - proBNP 92596  - troponin 0.03  - s/p IV lasix 40mg x 2 in ED.   - changed to iv lasix daily.,   - check TTE and TSH, 3.3  - monitor daily weight, strict I & O, Low Na diet with fluid restriction  - monitor on Telemetry.       LOLA on CKD III  - transient increase to 1.7 creatinine, this morning is normla  - c/w IV lasix as pt with still some edema    Mild hyperkalemia  - resolve     Nasal bone fracture   Due mechanical fall   - pain control prn  - as per ENT no intervention    HTN/HLD   - c/w home med    DVT ppx: Lovenox SC  GI ppx: not indicated.   Diet: DASH diet with fluid restriction  Activity: as tolerated.     #Progress Note Handoff:  Pending (specify): Resolution of LOLA  Family discussion: as above with pt  Disposition: Home_x__/SNF___/Other________/Unknown at this time________  
82 yo F with hx of HTN, HLD, CHF presents to ED for SOB.     Acute hypoxic respiratory failure   likely 2/2 acute CHF exacerbation 2/2 medication non-compliance and rhino virus   - CTA chest: neg for acute PE. Reflux of contrast into the inferior vena cava and hepatic veins. Small bilateral pleural effusions and mild mosaic lung attenuation. -   - EKG: Sinus rhythm with PVC and PAC. Prolong QTc 487.   - proBNP 18195  - troponin 0.03. no chest pain during my interview   - s/p IV lasix 40mg x 2 in ED.   - c/w IV lasix 40mg BID (pt takes PO lasix 40mg qd at home)   - check TTE and TSH, pending   - monitor daily weight, strict I & O, Low Na diet with fluid restriction  - monitor on Telemetry.   follow cardiology recommendations     Mild hyperkalemia   - c/w IV lasix   - repeat BMP     Nasal bone fracture 2/2 mechanical fall   - pain control prn  - ENT consult. pending     HTN/HLD - c/w home med    DVT ppx: Lovenox SC  GI ppx: not indicated.   Diet: DASH diet with fluid restriction  Activity: as tolerated.       follow up: follow cardiology and ENT. d/w familt at bedside.  follow echo.  tsh
83F w/ PMHx HTN, HLD, and CHF presents to the ED with shortness of breath.    #Acute on Chronic CHF Exacerbation  - CTA Chest shows small b/l pleural effusions, evidence of right heart failure, no consolidations  - CXR shows vascular congestion and small b/l effusions  - EKG in ED showed Sinus Rhythm w/ PVC and PACs  - Sat 96% on 3L NC  - BNP 15885 (unknown baseline), now 1796  - Trop elevated at 0.02  - Takes Lasix 40mg PO qD at home, did not take meds for past few days before presentation  - S/p Lasix 40mg IV x2 in the ED  - Hold lasix for LOLA  - Trop 0.02->0.03->0.03  - A1c 5.8  - Outpatient cardiology results obtained and scanned  - 2/2023 TTE EF 50-55% with no diastolic filling defect    #LOLA  - 1.1 -> 1.7 -> 1.2  - In setting of lasix 40 BID  - Will hold diuresis  - Monitor Cr  #Hyperkalemia - Resolved  - K 5.1 on admission  - monitor lytes and correct PRN    #Nasal Bone Fractures s/p Mechanical Fall  #h/o Facial Trauma s/p Mechanical Fall (~2019)  - CT Maxillofacial shows bilateral nasal bone fractures are noted with displacement and deviation to the right  - CT Head -ve for acute intracranial pathology  - F/u ENT consult    #Left Leg Trauma s/p Mechanical Fall  - XR of left foot, ankle and tib/fib negative for fracture    #HTN  - holding home Lisinopril 10mg daily d/t hyperkalemia  - Monitor BP  - resume home BP meds when appropriate  - Coreg 6.25 BID    #HLD  - C/w home Atorvastatin 40mg daily  - Total Cholesterol 223,     #Misc  #DVT ppx: Lovenox  #GI ppx: n/a  #Diet: regular - DASH/TLC w/ 1.5L fluid restriction  #Code Status: Full Code  #Dispo: from home  
83F w/ PMHx HTN, HLD, and CHF presents to the ED with shortness of breath.    #Acute on Chronic CHF Exacerbation  - CTA Chest shows small b/l pleural effusions, evidence of right heart failure, no consolidations  - CXR shows vascular congestion and small b/l effusions  - EKG in ED showed Sinus Rhythm w/ PVC and PACs  - Sat 96% on 3L NC  - BNP 31957 (unknown baseline)  - Trop elevated at 0.02  - Takes Lasix 40mg PO qD at home, did not take meds for past few days before presentation  - S/p Lasix 40mg IV x2 in the ED  - C/w Lasix 40mg IV BID for now  - Trop 0.02->0.03->0.03  - A1c 5.8  - F/u TTE    #Hyperkalemia - Resolved  - K 5.1 on admission  - actively diuresing  - monitor lytes and correct PRN    #Nasal Bone Fractures s/p Mechanical Fall  #h/o Facial Trauma s/p Mechanical Fall (~2019)  - CT Maxillofacial shows bilateral nasal bone fractures are noted with displacement and deviation to the right  - CT Head -ve for acute intracranial pathology  - F/u ENT consult    #Left Leg Trauma s/p Mechanical Fall  - XR of left foot, ankle and tib/fib negative for fracture    #HTN  - holding home Lisinopril 10mg daily d/t hyperkalemia  - Monitor BP  - resume home BP meds when appropriate  - Coreg 6.25 BID    #HLD  - C/w home Atorvastatin 40mg daily  - Total Cholesterol 223,     #Misc  #DVT ppx: Lovenox SubQ  #GI ppx: n/a  #Diet: regular - DASH/TLC w/ 1.5L fluid restriction  #Activity: IAT, fall risk - f/u PT/OT consult  #Code Status: Full Code  #Dispo: from home

## 2023-10-19 ENCOUNTER — TRANSCRIPTION ENCOUNTER (OUTPATIENT)
Age: 83
End: 2023-10-19

## 2023-10-19 VITALS
SYSTOLIC BLOOD PRESSURE: 105 MMHG | TEMPERATURE: 98 F | DIASTOLIC BLOOD PRESSURE: 65 MMHG | HEART RATE: 76 BPM | RESPIRATION RATE: 18 BRPM | WEIGHT: 171.74 LBS

## 2023-10-19 LAB
ANION GAP SERPL CALC-SCNC: 15 MMOL/L — HIGH (ref 7–14)
ANION GAP SERPL CALC-SCNC: 15 MMOL/L — HIGH (ref 7–14)
BUN SERPL-MCNC: 34 MG/DL — HIGH (ref 10–20)
BUN SERPL-MCNC: 34 MG/DL — HIGH (ref 10–20)
CALCIUM SERPL-MCNC: 8.4 MG/DL — SIGNIFICANT CHANGE UP (ref 8.4–10.5)
CALCIUM SERPL-MCNC: 8.4 MG/DL — SIGNIFICANT CHANGE UP (ref 8.4–10.5)
CHLORIDE SERPL-SCNC: 104 MMOL/L — SIGNIFICANT CHANGE UP (ref 98–110)
CHLORIDE SERPL-SCNC: 104 MMOL/L — SIGNIFICANT CHANGE UP (ref 98–110)
CO2 SERPL-SCNC: 21 MMOL/L — SIGNIFICANT CHANGE UP (ref 17–32)
CO2 SERPL-SCNC: 21 MMOL/L — SIGNIFICANT CHANGE UP (ref 17–32)
CREAT SERPL-MCNC: 1.1 MG/DL — SIGNIFICANT CHANGE UP (ref 0.7–1.5)
CREAT SERPL-MCNC: 1.1 MG/DL — SIGNIFICANT CHANGE UP (ref 0.7–1.5)
EGFR: 49 ML/MIN/1.73M2 — LOW
EGFR: 49 ML/MIN/1.73M2 — LOW
GLUCOSE SERPL-MCNC: 88 MG/DL — SIGNIFICANT CHANGE UP (ref 70–99)
GLUCOSE SERPL-MCNC: 88 MG/DL — SIGNIFICANT CHANGE UP (ref 70–99)
HCT VFR BLD CALC: 41 % — SIGNIFICANT CHANGE UP (ref 37–47)
HCT VFR BLD CALC: 41 % — SIGNIFICANT CHANGE UP (ref 37–47)
HGB BLD-MCNC: 13.2 G/DL — SIGNIFICANT CHANGE UP (ref 12–16)
HGB BLD-MCNC: 13.2 G/DL — SIGNIFICANT CHANGE UP (ref 12–16)
MAGNESIUM SERPL-MCNC: 2.2 MG/DL — SIGNIFICANT CHANGE UP (ref 1.8–2.4)
MAGNESIUM SERPL-MCNC: 2.2 MG/DL — SIGNIFICANT CHANGE UP (ref 1.8–2.4)
MCHC RBC-ENTMCNC: 29 PG — SIGNIFICANT CHANGE UP (ref 27–31)
MCHC RBC-ENTMCNC: 29 PG — SIGNIFICANT CHANGE UP (ref 27–31)
MCHC RBC-ENTMCNC: 32.2 G/DL — SIGNIFICANT CHANGE UP (ref 32–37)
MCHC RBC-ENTMCNC: 32.2 G/DL — SIGNIFICANT CHANGE UP (ref 32–37)
MCV RBC AUTO: 90.1 FL — SIGNIFICANT CHANGE UP (ref 81–99)
MCV RBC AUTO: 90.1 FL — SIGNIFICANT CHANGE UP (ref 81–99)
NRBC # BLD: 0 /100 WBCS — SIGNIFICANT CHANGE UP (ref 0–0)
NRBC # BLD: 0 /100 WBCS — SIGNIFICANT CHANGE UP (ref 0–0)
PLATELET # BLD AUTO: 239 K/UL — SIGNIFICANT CHANGE UP (ref 130–400)
PLATELET # BLD AUTO: 239 K/UL — SIGNIFICANT CHANGE UP (ref 130–400)
PMV BLD: 9.7 FL — SIGNIFICANT CHANGE UP (ref 7.4–10.4)
PMV BLD: 9.7 FL — SIGNIFICANT CHANGE UP (ref 7.4–10.4)
POTASSIUM SERPL-MCNC: 4.2 MMOL/L — SIGNIFICANT CHANGE UP (ref 3.5–5)
POTASSIUM SERPL-MCNC: 4.2 MMOL/L — SIGNIFICANT CHANGE UP (ref 3.5–5)
POTASSIUM SERPL-SCNC: 4.2 MMOL/L — SIGNIFICANT CHANGE UP (ref 3.5–5)
POTASSIUM SERPL-SCNC: 4.2 MMOL/L — SIGNIFICANT CHANGE UP (ref 3.5–5)
RBC # BLD: 4.55 M/UL — SIGNIFICANT CHANGE UP (ref 4.2–5.4)
RBC # BLD: 4.55 M/UL — SIGNIFICANT CHANGE UP (ref 4.2–5.4)
RBC # FLD: 13.8 % — SIGNIFICANT CHANGE UP (ref 11.5–14.5)
RBC # FLD: 13.8 % — SIGNIFICANT CHANGE UP (ref 11.5–14.5)
SODIUM SERPL-SCNC: 140 MMOL/L — SIGNIFICANT CHANGE UP (ref 135–146)
SODIUM SERPL-SCNC: 140 MMOL/L — SIGNIFICANT CHANGE UP (ref 135–146)
WBC # BLD: 5.91 K/UL — SIGNIFICANT CHANGE UP (ref 4.8–10.8)
WBC # BLD: 5.91 K/UL — SIGNIFICANT CHANGE UP (ref 4.8–10.8)
WBC # FLD AUTO: 5.91 K/UL — SIGNIFICANT CHANGE UP (ref 4.8–10.8)
WBC # FLD AUTO: 5.91 K/UL — SIGNIFICANT CHANGE UP (ref 4.8–10.8)

## 2023-10-19 PROCEDURE — 99239 HOSP IP/OBS DSCHRG MGMT >30: CPT

## 2023-10-19 RX ADMIN — CARVEDILOL PHOSPHATE 6.25 MILLIGRAM(S): 80 CAPSULE, EXTENDED RELEASE ORAL at 05:56

## 2023-10-19 RX ADMIN — CHLORHEXIDINE GLUCONATE 1 APPLICATION(S): 213 SOLUTION TOPICAL at 06:01

## 2023-10-19 RX ADMIN — Medication 40 MILLIGRAM(S): at 05:56

## 2023-10-19 RX ADMIN — Medication 5 MILLIGRAM(S): at 01:15

## 2023-10-19 RX ADMIN — ENOXAPARIN SODIUM 40 MILLIGRAM(S): 100 INJECTION SUBCUTANEOUS at 05:56

## 2023-10-19 NOTE — DISCHARGE NOTE PROVIDER - PROVIDER TOKENS
PROVIDER:[TOKEN:[[id number]],FOLLOWUP:[2 weeks]],PROVIDER:[TOKEN:[[id number]],FOLLOWUP:[1 week]]

## 2023-10-19 NOTE — DISCHARGE NOTE PROVIDER - NSDCPNSUBOBJ_GEN_ALL_CORE
Pt seen and examined at bedside. Daughter was present. Pt is euvolemic, not hypoxic, did well with PT. Pt and daughter insisting on going home today. Case d/w cardio-ok to do echo as an outpt as exacerabation seems to be related to missed lasix dose. Do not want to monitor on transition to PO lasix. Pt to be dc home today/.

## 2023-10-19 NOTE — DISCHARGE NOTE PROVIDER - NSDCMRMEDTOKEN_GEN_ALL_CORE_FT
atorvastatin 40 mg oral tablet: 1 tab(s) orally once a day (at bedtime)  carvedilol 6.25 mg oral tablet: 1 tab(s) orally every 12 hours  diazePAM 5 mg oral tablet: 1 tab(s) orally once a day (at bedtime) as needed for  insomnia  Lasix 40 mg oral tablet: 1 tab(s) orally once a day  lisinopril 10 mg oral tablet: 1 tab(s) orally once a day

## 2023-10-19 NOTE — DISCHARGE NOTE PROVIDER - CARE PROVIDERS DIRECT ADDRESSES
,[email or web address],DirectAddress_Unknown

## 2023-10-19 NOTE — DISCHARGE NOTE PROVIDER - HOSPITAL COURSE
History of Presenting Illness:   83F w/ PMHx CHF, HTN, HLD and remote smoking hx presents to the ED with shortness of breath. Patient is visiting family from [place] and forgot her medications for the past few days. Prior to the ED, she was at urgent care where she was found to be hypoxic in the mid 80s on RA. Patient is not on home O2. She admits to some lightheadedness, dry cough, leg swelling, left leg pain and nose pain, but denies palpitations, chest pain, headaches, fever, chills, sore throat, abdominal pain, nausea, vomiting, diarrhea or dysuria. Of note, patient's daughter passed away 2 weeks ago and is undergoing a lot of emotional stress. She also had a mechanical fall a few days ago with head and LLE trauma.    Vitals: Temp 99F, /99, HR 93, RR 19, SpO2 96% on 3L NC    Labs: K 5.1, Trop 0.02, BNP 57910 (unknown baseline), D-dimer 474    Imaging:  - CT Head/Maxillofacial/Neck: Bilateral nasal bone fractures are noted with displacement and deviation to the right, otherwise no other acute intracranial pathology.  - CTA Chest: No PE noted. Small bilateral pleural effusions with compressive atelectasis. No consolidations. Reflux of contrast into the inferior vena cava and hepatic veins. Small bilateral pleural effusions and mild mosaic lung attenuation. Findings are compatible with right heart failure.    EKG showed sinus rhythm w/ PACs and PVCs    In the ED:  - s/p Lasix 40mg IV x2  - Duonebs x1  - Decadron 6mg IV x1    Admitted to medicine/telemetry for further management    Hospital Course :   82 yo F PMHx HTN, HLD, chronic HFpEF presented to ED for evaluation of SOB. Pt reports she missed several days of lasix. Unfortunately pt's daughter recently passed away. She went to stay with her other daughter and forgot her lasix at home. Pt had mechanical fall. Her daughter's stairs didn't have a railing, pt lost footing and fell.     Acute hypoxic respiratory failure  Likely due to acute on chronic HFpEF due to missed lasix  - CTA chest: neg for acute PE. Reflux of contrast into the inferior vena cava and hepatic veins. Small bilateral pleural effusions and mild mosaic lung attenuation. -   - EKG: Sinus rhythm with PVC and PAC. Prolong QTc 487.   - proBNP 02459  - troponin 0.03  - s/p IV lasix 40mg x 2 in ED.   - changed to iv lasix daily.,   - check TTE and TSH, 3.3  - monitor daily weight, strict I & O, Low Na diet with fluid restriction  - monitor on Telemetry.   - Patient is euvolemic on examination and stable enough to get discharged       LOLA on CKD III  - Creatinine back to basline     Mild hyperkalemia  - resolved     Nasal bone fracture   Due mechanical fall   - pain control prn  - as per ENT no intervention    HTN/HLD   - c/w home med    DVT ppx: Lovenox SC  GI ppx: not indicated.   Diet: DASH diet with fluid restriction  Activity: as tolerated.

## 2023-10-19 NOTE — DISCHARGE NOTE PROVIDER - CARE PROVIDER_API CALL
Abbie Mathews  Geriatric Medicine  [address] [place] [address]  Phone: [phone]  Fax: [phone]  Follow Up Time: 2 weeks    Janelle Clarke  Cardiovascular Disease  [address] [place] [address]  Phone: [phone]  Fax: [phone]  Follow Up Time: 1 week

## 2023-10-19 NOTE — DISCHARGE NOTE PROVIDER - NSDCCPCAREPLAN_GEN_ALL_CORE_FT
PRINCIPAL DISCHARGE DIAGNOSIS  Diagnosis: Acute CHF  Assessment and Plan of Treatment: You were admitted to the hospital for acute heart failure and the following was done .   # Acute Heart Failure due to missed Lasix   - You were admitted to the telemetry floor for heart failure management which was managed with durosemide IV and transitioned to oral.   - Carvedilol was used   - Daily weight was monitored   - Input and output was monitored   - You are euvolemic on examination and are stable enough to get discharged  # LOLA on CKD 3   - You had acute kidney injury for which Lasix was holded  - Your creatinine came back to baseline   - You are stable for discharge   # Nasal bone Fracture   - You were seen by ENT   - According to them, you need to follow up in the outpatient for the management   # Hypertension:   - Continue with your home medications   Please follow up with your appointments   Please take the medications as prescribed      SECONDARY DISCHARGE DIAGNOSES  Diagnosis: Shortness of breath  Assessment and Plan of Treatment:     Diagnosis: Cardiac arrhythmia  Assessment and Plan of Treatment:

## 2023-10-20 ENCOUNTER — INPATIENT (INPATIENT)
Facility: HOSPITAL | Age: 83
LOS: 5 days | Discharge: HOME HEALTH | DRG: 275 | End: 2023-10-26
Attending: INTERNAL MEDICINE | Admitting: INTERNAL MEDICINE
Payer: MEDICARE

## 2023-10-20 VITALS
OXYGEN SATURATION: 99 % | WEIGHT: 169.98 LBS | TEMPERATURE: 97 F | SYSTOLIC BLOOD PRESSURE: 90 MMHG | DIASTOLIC BLOOD PRESSURE: 50 MMHG | HEART RATE: 58 BPM | HEIGHT: 61 IN | RESPIRATION RATE: 16 BRPM

## 2023-10-20 DIAGNOSIS — R55 SYNCOPE AND COLLAPSE: ICD-10-CM

## 2023-10-20 LAB
ALBUMIN SERPL ELPH-MCNC: 3.8 G/DL — SIGNIFICANT CHANGE UP (ref 3.5–5.2)
ALBUMIN SERPL ELPH-MCNC: 3.8 G/DL — SIGNIFICANT CHANGE UP (ref 3.5–5.2)
ALP SERPL-CCNC: 113 U/L — SIGNIFICANT CHANGE UP (ref 30–115)
ALP SERPL-CCNC: 113 U/L — SIGNIFICANT CHANGE UP (ref 30–115)
ALT FLD-CCNC: 60 U/L — HIGH (ref 0–41)
ALT FLD-CCNC: 60 U/L — HIGH (ref 0–41)
ANION GAP SERPL CALC-SCNC: 9 MMOL/L — SIGNIFICANT CHANGE UP (ref 7–14)
ANION GAP SERPL CALC-SCNC: 9 MMOL/L — SIGNIFICANT CHANGE UP (ref 7–14)
APPEARANCE UR: ABNORMAL
APPEARANCE UR: ABNORMAL
AST SERPL-CCNC: 55 U/L — HIGH (ref 0–41)
AST SERPL-CCNC: 55 U/L — HIGH (ref 0–41)
BACTERIA # UR AUTO: ABNORMAL /HPF
BACTERIA # UR AUTO: ABNORMAL /HPF
BASE EXCESS BLDV CALC-SCNC: 5.7 MMOL/L — HIGH (ref -2–3)
BASE EXCESS BLDV CALC-SCNC: 5.7 MMOL/L — HIGH (ref -2–3)
BASOPHILS # BLD AUTO: 0.03 K/UL — SIGNIFICANT CHANGE UP (ref 0–0.2)
BASOPHILS # BLD AUTO: 0.03 K/UL — SIGNIFICANT CHANGE UP (ref 0–0.2)
BASOPHILS NFR BLD AUTO: 0.5 % — SIGNIFICANT CHANGE UP (ref 0–1)
BASOPHILS NFR BLD AUTO: 0.5 % — SIGNIFICANT CHANGE UP (ref 0–1)
BILIRUB SERPL-MCNC: 0.5 MG/DL — SIGNIFICANT CHANGE UP (ref 0.2–1.2)
BILIRUB SERPL-MCNC: 0.5 MG/DL — SIGNIFICANT CHANGE UP (ref 0.2–1.2)
BILIRUB UR-MCNC: NEGATIVE — SIGNIFICANT CHANGE UP
BILIRUB UR-MCNC: NEGATIVE — SIGNIFICANT CHANGE UP
BUN SERPL-MCNC: 32 MG/DL — HIGH (ref 10–20)
BUN SERPL-MCNC: 32 MG/DL — HIGH (ref 10–20)
CA-I SERPL-SCNC: 1.09 MMOL/L — LOW (ref 1.15–1.33)
CA-I SERPL-SCNC: 1.09 MMOL/L — LOW (ref 1.15–1.33)
CALCIUM SERPL-MCNC: 8.9 MG/DL — SIGNIFICANT CHANGE UP (ref 8.4–10.4)
CALCIUM SERPL-MCNC: 8.9 MG/DL — SIGNIFICANT CHANGE UP (ref 8.4–10.4)
CAST: 4 /LPF — SIGNIFICANT CHANGE UP (ref 0–4)
CAST: 4 /LPF — SIGNIFICANT CHANGE UP (ref 0–4)
CHLORIDE SERPL-SCNC: 101 MMOL/L — SIGNIFICANT CHANGE UP (ref 98–110)
CHLORIDE SERPL-SCNC: 101 MMOL/L — SIGNIFICANT CHANGE UP (ref 98–110)
CO2 SERPL-SCNC: 28 MMOL/L — SIGNIFICANT CHANGE UP (ref 17–32)
CO2 SERPL-SCNC: 28 MMOL/L — SIGNIFICANT CHANGE UP (ref 17–32)
COLOR SPEC: YELLOW — SIGNIFICANT CHANGE UP
COLOR SPEC: YELLOW — SIGNIFICANT CHANGE UP
CREAT SERPL-MCNC: 1.3 MG/DL — SIGNIFICANT CHANGE UP (ref 0.7–1.5)
CREAT SERPL-MCNC: 1.3 MG/DL — SIGNIFICANT CHANGE UP (ref 0.7–1.5)
DIFF PNL FLD: NEGATIVE — SIGNIFICANT CHANGE UP
DIFF PNL FLD: NEGATIVE — SIGNIFICANT CHANGE UP
EGFR: 40 ML/MIN/1.73M2 — LOW
EGFR: 40 ML/MIN/1.73M2 — LOW
EOSINOPHIL # BLD AUTO: 0.1 K/UL — SIGNIFICANT CHANGE UP (ref 0–0.7)
EOSINOPHIL # BLD AUTO: 0.1 K/UL — SIGNIFICANT CHANGE UP (ref 0–0.7)
EOSINOPHIL NFR BLD AUTO: 1.7 % — SIGNIFICANT CHANGE UP (ref 0–8)
EOSINOPHIL NFR BLD AUTO: 1.7 % — SIGNIFICANT CHANGE UP (ref 0–8)
GAS PNL BLDV: 135 MMOL/L — LOW (ref 136–145)
GAS PNL BLDV: 135 MMOL/L — LOW (ref 136–145)
GAS PNL BLDV: SIGNIFICANT CHANGE UP
GAS PNL BLDV: SIGNIFICANT CHANGE UP
GLUCOSE SERPL-MCNC: 129 MG/DL — HIGH (ref 70–99)
GLUCOSE SERPL-MCNC: 129 MG/DL — HIGH (ref 70–99)
GLUCOSE UR QL: NEGATIVE MG/DL — SIGNIFICANT CHANGE UP
GLUCOSE UR QL: NEGATIVE MG/DL — SIGNIFICANT CHANGE UP
HCO3 BLDV-SCNC: 32 MMOL/L — HIGH (ref 22–29)
HCO3 BLDV-SCNC: 32 MMOL/L — HIGH (ref 22–29)
HCT VFR BLD CALC: 42.3 % — SIGNIFICANT CHANGE UP (ref 37–47)
HCT VFR BLD CALC: 42.3 % — SIGNIFICANT CHANGE UP (ref 37–47)
HCT VFR BLDA CALC: 40 % — SIGNIFICANT CHANGE UP (ref 39–51)
HCT VFR BLDA CALC: 40 % — SIGNIFICANT CHANGE UP (ref 39–51)
HGB BLD CALC-MCNC: 13.4 G/DL — SIGNIFICANT CHANGE UP (ref 12.6–17.4)
HGB BLD CALC-MCNC: 13.4 G/DL — SIGNIFICANT CHANGE UP (ref 12.6–17.4)
HGB BLD-MCNC: 13.7 G/DL — SIGNIFICANT CHANGE UP (ref 12–16)
HGB BLD-MCNC: 13.7 G/DL — SIGNIFICANT CHANGE UP (ref 12–16)
IMM GRANULOCYTES NFR BLD AUTO: 0.3 % — SIGNIFICANT CHANGE UP (ref 0.1–0.3)
IMM GRANULOCYTES NFR BLD AUTO: 0.3 % — SIGNIFICANT CHANGE UP (ref 0.1–0.3)
KETONES UR-MCNC: NEGATIVE MG/DL — SIGNIFICANT CHANGE UP
KETONES UR-MCNC: NEGATIVE MG/DL — SIGNIFICANT CHANGE UP
LACTATE BLDV-MCNC: 0.6 MMOL/L — SIGNIFICANT CHANGE UP (ref 0.5–2)
LACTATE BLDV-MCNC: 0.6 MMOL/L — SIGNIFICANT CHANGE UP (ref 0.5–2)
LEUKOCYTE ESTERASE UR-ACNC: ABNORMAL
LEUKOCYTE ESTERASE UR-ACNC: ABNORMAL
LYMPHOCYTES # BLD AUTO: 1.31 K/UL — SIGNIFICANT CHANGE UP (ref 1.2–3.4)
LYMPHOCYTES # BLD AUTO: 1.31 K/UL — SIGNIFICANT CHANGE UP (ref 1.2–3.4)
LYMPHOCYTES # BLD AUTO: 22.1 % — SIGNIFICANT CHANGE UP (ref 20.5–51.1)
LYMPHOCYTES # BLD AUTO: 22.1 % — SIGNIFICANT CHANGE UP (ref 20.5–51.1)
MAGNESIUM SERPL-MCNC: 2.3 MG/DL — SIGNIFICANT CHANGE UP (ref 1.8–2.4)
MAGNESIUM SERPL-MCNC: 2.3 MG/DL — SIGNIFICANT CHANGE UP (ref 1.8–2.4)
MCHC RBC-ENTMCNC: 29.7 PG — SIGNIFICANT CHANGE UP (ref 27–31)
MCHC RBC-ENTMCNC: 29.7 PG — SIGNIFICANT CHANGE UP (ref 27–31)
MCHC RBC-ENTMCNC: 32.4 G/DL — SIGNIFICANT CHANGE UP (ref 32–37)
MCHC RBC-ENTMCNC: 32.4 G/DL — SIGNIFICANT CHANGE UP (ref 32–37)
MCV RBC AUTO: 91.6 FL — SIGNIFICANT CHANGE UP (ref 81–99)
MCV RBC AUTO: 91.6 FL — SIGNIFICANT CHANGE UP (ref 81–99)
MONOCYTES # BLD AUTO: 0.45 K/UL — SIGNIFICANT CHANGE UP (ref 0.1–0.6)
MONOCYTES # BLD AUTO: 0.45 K/UL — SIGNIFICANT CHANGE UP (ref 0.1–0.6)
MONOCYTES NFR BLD AUTO: 7.6 % — SIGNIFICANT CHANGE UP (ref 1.7–9.3)
MONOCYTES NFR BLD AUTO: 7.6 % — SIGNIFICANT CHANGE UP (ref 1.7–9.3)
NEUTROPHILS # BLD AUTO: 4.02 K/UL — SIGNIFICANT CHANGE UP (ref 1.4–6.5)
NEUTROPHILS # BLD AUTO: 4.02 K/UL — SIGNIFICANT CHANGE UP (ref 1.4–6.5)
NEUTROPHILS NFR BLD AUTO: 67.8 % — SIGNIFICANT CHANGE UP (ref 42.2–75.2)
NEUTROPHILS NFR BLD AUTO: 67.8 % — SIGNIFICANT CHANGE UP (ref 42.2–75.2)
NITRITE UR-MCNC: NEGATIVE — SIGNIFICANT CHANGE UP
NITRITE UR-MCNC: NEGATIVE — SIGNIFICANT CHANGE UP
NRBC # BLD: 0 /100 WBCS — SIGNIFICANT CHANGE UP (ref 0–0)
NRBC # BLD: 0 /100 WBCS — SIGNIFICANT CHANGE UP (ref 0–0)
NT-PROBNP SERPL-SCNC: 2707 PG/ML — HIGH (ref 0–300)
NT-PROBNP SERPL-SCNC: 2707 PG/ML — HIGH (ref 0–300)
PCO2 BLDV: 53 MMHG — HIGH (ref 39–42)
PCO2 BLDV: 53 MMHG — HIGH (ref 39–42)
PH BLDV: 7.39 — SIGNIFICANT CHANGE UP (ref 7.32–7.43)
PH BLDV: 7.39 — SIGNIFICANT CHANGE UP (ref 7.32–7.43)
PH UR: 5.5 — SIGNIFICANT CHANGE UP (ref 5–8)
PH UR: 5.5 — SIGNIFICANT CHANGE UP (ref 5–8)
PLATELET # BLD AUTO: 240 K/UL — SIGNIFICANT CHANGE UP (ref 130–400)
PLATELET # BLD AUTO: 240 K/UL — SIGNIFICANT CHANGE UP (ref 130–400)
PMV BLD: 9.6 FL — SIGNIFICANT CHANGE UP (ref 7.4–10.4)
PMV BLD: 9.6 FL — SIGNIFICANT CHANGE UP (ref 7.4–10.4)
PO2 BLDV: 20 MMHG — SIGNIFICANT CHANGE UP
PO2 BLDV: 20 MMHG — SIGNIFICANT CHANGE UP
POTASSIUM BLDV-SCNC: 4.9 MMOL/L — SIGNIFICANT CHANGE UP (ref 3.5–5.1)
POTASSIUM BLDV-SCNC: 4.9 MMOL/L — SIGNIFICANT CHANGE UP (ref 3.5–5.1)
POTASSIUM SERPL-MCNC: 4.5 MMOL/L — SIGNIFICANT CHANGE UP (ref 3.5–5)
POTASSIUM SERPL-MCNC: 4.5 MMOL/L — SIGNIFICANT CHANGE UP (ref 3.5–5)
POTASSIUM SERPL-SCNC: 4.5 MMOL/L — SIGNIFICANT CHANGE UP (ref 3.5–5)
POTASSIUM SERPL-SCNC: 4.5 MMOL/L — SIGNIFICANT CHANGE UP (ref 3.5–5)
PROT SERPL-MCNC: 6.3 G/DL — SIGNIFICANT CHANGE UP (ref 6–8)
PROT SERPL-MCNC: 6.3 G/DL — SIGNIFICANT CHANGE UP (ref 6–8)
PROT UR-MCNC: SIGNIFICANT CHANGE UP MG/DL
PROT UR-MCNC: SIGNIFICANT CHANGE UP MG/DL
RBC # BLD: 4.62 M/UL — SIGNIFICANT CHANGE UP (ref 4.2–5.4)
RBC # BLD: 4.62 M/UL — SIGNIFICANT CHANGE UP (ref 4.2–5.4)
RBC # FLD: 13.8 % — SIGNIFICANT CHANGE UP (ref 11.5–14.5)
RBC # FLD: 13.8 % — SIGNIFICANT CHANGE UP (ref 11.5–14.5)
RBC CASTS # UR COMP ASSIST: 1 /HPF — SIGNIFICANT CHANGE UP (ref 0–4)
RBC CASTS # UR COMP ASSIST: 1 /HPF — SIGNIFICANT CHANGE UP (ref 0–4)
SAO2 % BLDV: 24.3 % — SIGNIFICANT CHANGE UP
SAO2 % BLDV: 24.3 % — SIGNIFICANT CHANGE UP
SODIUM SERPL-SCNC: 138 MMOL/L — SIGNIFICANT CHANGE UP (ref 135–146)
SODIUM SERPL-SCNC: 138 MMOL/L — SIGNIFICANT CHANGE UP (ref 135–146)
SP GR SPEC: 1.02 — SIGNIFICANT CHANGE UP (ref 1–1.03)
SP GR SPEC: 1.02 — SIGNIFICANT CHANGE UP (ref 1–1.03)
SQUAMOUS # UR AUTO: 10 /HPF — HIGH (ref 0–5)
SQUAMOUS # UR AUTO: 10 /HPF — HIGH (ref 0–5)
TROPONIN T SERPL-MCNC: <0.01 NG/ML — SIGNIFICANT CHANGE UP
TROPONIN T SERPL-MCNC: <0.01 NG/ML — SIGNIFICANT CHANGE UP
UROBILINOGEN FLD QL: 1 MG/DL — SIGNIFICANT CHANGE UP (ref 0.2–1)
UROBILINOGEN FLD QL: 1 MG/DL — SIGNIFICANT CHANGE UP (ref 0.2–1)
WBC # BLD: 5.93 K/UL — SIGNIFICANT CHANGE UP (ref 4.8–10.8)
WBC # BLD: 5.93 K/UL — SIGNIFICANT CHANGE UP (ref 4.8–10.8)
WBC # FLD AUTO: 5.93 K/UL — SIGNIFICANT CHANGE UP (ref 4.8–10.8)
WBC # FLD AUTO: 5.93 K/UL — SIGNIFICANT CHANGE UP (ref 4.8–10.8)
WBC UR QL: 15 /HPF — HIGH (ref 0–5)
WBC UR QL: 15 /HPF — HIGH (ref 0–5)

## 2023-10-20 PROCEDURE — 36415 COLL VENOUS BLD VENIPUNCTURE: CPT

## 2023-10-20 PROCEDURE — 87086 URINE CULTURE/COLONY COUNT: CPT

## 2023-10-20 PROCEDURE — 71045 X-RAY EXAM CHEST 1 VIEW: CPT | Mod: 26

## 2023-10-20 PROCEDURE — C1721: CPT

## 2023-10-20 PROCEDURE — C1777: CPT

## 2023-10-20 PROCEDURE — 86850 RBC ANTIBODY SCREEN: CPT

## 2023-10-20 PROCEDURE — 71046 X-RAY EXAM CHEST 2 VIEWS: CPT

## 2023-10-20 PROCEDURE — 93005 ELECTROCARDIOGRAM TRACING: CPT

## 2023-10-20 PROCEDURE — 99285 EMERGENCY DEPT VISIT HI MDM: CPT

## 2023-10-20 PROCEDURE — C1769: CPT

## 2023-10-20 PROCEDURE — 80053 COMPREHEN METABOLIC PANEL: CPT

## 2023-10-20 PROCEDURE — 93010 ELECTROCARDIOGRAM REPORT: CPT

## 2023-10-20 PROCEDURE — C1889: CPT

## 2023-10-20 PROCEDURE — 81001 URINALYSIS AUTO W/SCOPE: CPT

## 2023-10-20 PROCEDURE — 99497 ADVNCD CARE PLAN 30 MIN: CPT | Mod: 25

## 2023-10-20 PROCEDURE — 71045 X-RAY EXAM CHEST 1 VIEW: CPT

## 2023-10-20 PROCEDURE — 85610 PROTHROMBIN TIME: CPT

## 2023-10-20 PROCEDURE — 84484 ASSAY OF TROPONIN QUANT: CPT

## 2023-10-20 PROCEDURE — C1892: CPT

## 2023-10-20 PROCEDURE — 93458 L HRT ARTERY/VENTRICLE ANGIO: CPT

## 2023-10-20 PROCEDURE — C1887: CPT

## 2023-10-20 PROCEDURE — 85730 THROMBOPLASTIN TIME PARTIAL: CPT

## 2023-10-20 PROCEDURE — C1898: CPT

## 2023-10-20 PROCEDURE — 86901 BLOOD TYPING SEROLOGIC RH(D): CPT

## 2023-10-20 PROCEDURE — C1894: CPT

## 2023-10-20 PROCEDURE — 93306 TTE W/DOPPLER COMPLETE: CPT

## 2023-10-20 PROCEDURE — 85025 COMPLETE CBC W/AUTO DIFF WBC: CPT

## 2023-10-20 PROCEDURE — 33249 INSJ/RPLCMT DEFIB W/LEAD(S): CPT

## 2023-10-20 PROCEDURE — 86900 BLOOD TYPING SEROLOGIC ABO: CPT

## 2023-10-20 PROCEDURE — 99223 1ST HOSP IP/OBS HIGH 75: CPT

## 2023-10-20 PROCEDURE — 83735 ASSAY OF MAGNESIUM: CPT

## 2023-10-20 PROCEDURE — 93283 PRGRMG EVAL IMPLANTABLE DFB: CPT

## 2023-10-20 RX ORDER — DIAZEPAM 5 MG
1 TABLET ORAL
Refills: 0 | DISCHARGE

## 2023-10-20 RX ORDER — ATORVASTATIN CALCIUM 80 MG/1
1 TABLET, FILM COATED ORAL
Refills: 0 | DISCHARGE

## 2023-10-20 RX ORDER — FUROSEMIDE 40 MG
1 TABLET ORAL
Refills: 0 | DISCHARGE

## 2023-10-20 RX ORDER — LANOLIN ALCOHOL/MO/W.PET/CERES
3 CREAM (GRAM) TOPICAL AT BEDTIME
Refills: 0 | Status: DISCONTINUED | OUTPATIENT
Start: 2023-10-20 | End: 2023-10-26

## 2023-10-20 RX ORDER — ATORVASTATIN CALCIUM 80 MG/1
40 TABLET, FILM COATED ORAL AT BEDTIME
Refills: 0 | Status: DISCONTINUED | OUTPATIENT
Start: 2023-10-20 | End: 2023-10-26

## 2023-10-20 RX ORDER — ENOXAPARIN SODIUM 100 MG/ML
40 INJECTION SUBCUTANEOUS EVERY 24 HOURS
Refills: 0 | Status: DISCONTINUED | OUTPATIENT
Start: 2023-10-20 | End: 2023-10-23

## 2023-10-20 RX ORDER — DIAZEPAM 5 MG
5 TABLET ORAL AT BEDTIME
Refills: 0 | Status: DISCONTINUED | OUTPATIENT
Start: 2023-10-20 | End: 2023-10-26

## 2023-10-20 RX ORDER — FUROSEMIDE 40 MG
40 TABLET ORAL DAILY
Refills: 0 | Status: DISCONTINUED | OUTPATIENT
Start: 2023-10-20 | End: 2023-10-26

## 2023-10-20 RX ORDER — LISINOPRIL 2.5 MG/1
10 TABLET ORAL DAILY
Refills: 0 | Status: DISCONTINUED | OUTPATIENT
Start: 2023-10-20 | End: 2023-10-21

## 2023-10-20 RX ADMIN — Medication 40 MILLIGRAM(S): at 21:51

## 2023-10-20 RX ADMIN — ENOXAPARIN SODIUM 40 MILLIGRAM(S): 100 INJECTION SUBCUTANEOUS at 21:51

## 2023-10-20 RX ADMIN — ATORVASTATIN CALCIUM 40 MILLIGRAM(S): 80 TABLET, FILM COATED ORAL at 21:51

## 2023-10-20 NOTE — H&P ADULT - ATTENDING COMMENTS
83 year old female patient known to have a.fib, CHF, HTN, DL, anxiety, presented for witnessed syncope.    Agree  with assessment  except for changes below.     Vital Signs (24 Hrs):  T(C): 36.7 (10-20-23 @ 23:46), Max: 37.1 (10-20-23 @ 20:04)  HR: 78 (10-20-23 @ 23:46) (58 - 87)  BP: 117/61 (10-20-23 @ 23:46) (90/50 - 138/60)  RR: 18 (10-20-23 @ 23:46) (16 - 20)  SpO2: 96% (10-20-23 @ 23:46) (93% - 99%)        IMPRESSION  Witnessed Syncope   suspected vasovagal Sympathy   +LOC - HT, + AC  Ddx (neuro mediated vs orthostatic vs cardiovascular)  tele monitoring arrhythmic events  Check orthostatic, check eeg,  tte, tsh/t4  Consider Neuro eval     Hx  HF   Elevated  BNP  2.7k, trace  Lower Edema   Chest X-Ray  Unremarkable, 96 on RA   EKG  NSR no acute Ischemic  Changes   c/w Lasix      Asymptomatic Cystitis   UA +  Asymptomatic  Monitor       Hx HTN  Hx DLD  c/w statin  c/w lisinopril  hold carvedilol for now     Hx Anxiety  -c/w Diazepam 83 year old female patient known to have a.fib, CHF, HTN, DL, anxiety, presented for witnessed syncope.    Agree  with assessment  except for changes below.     Vital Signs (24 Hrs):  T(C): 36.7 (10-20-23 @ 23:46), Max: 37.1 (10-20-23 @ 20:04)  HR: 78 (10-20-23 @ 23:46) (58 - 87)  BP: 117/61 (10-20-23 @ 23:46) (90/50 - 138/60)  RR: 18 (10-20-23 @ 23:46) (16 - 20)  SpO2: 96% (10-20-23 @ 23:46) (93% - 99%)        IMPRESSION  Witnessed Syncope   suspected vasovagal Syncope    +LOC - HT, + AC  Ddx (neuro mediated vs orthostatic vs cardiovascular)  tele monitoring arrhythmic events  Check orthostatic, check eeg,  tte, tsh/t4  Consider Neuro eval     Hx  HF   Elevated  BNP  2.7k, trace  Lower Edema   Chest X-Ray  Unremarkable, 96 on RA   EKG  NSR no acute Ischemic  Changes   c/w Lasix      Asymptomatic Cystitis   UA +  Asymptomatic  Monitor       Hx HTN  Hx DLD  c/w statin  c/w lisinopril  hold carvedilol for now     Hx Anxiety  -c/w Diazepam 83 year old female patient known to have a.fib, CHF, HTN, DL, anxiety, presented for witnessed syncope.    Agree  with assessment  except for changes below.     Vital Signs (24 Hrs):  T(C): 36.7 (10-20-23 @ 23:46), Max: 37.1 (10-20-23 @ 20:04)  HR: 78 (10-20-23 @ 23:46) (58 - 87)  BP: 117/61 (10-20-23 @ 23:46) (90/50 - 138/60)  RR: 18 (10-20-23 @ 23:46) (16 - 20)  SpO2: 96% (10-20-23 @ 23:46) (93% - 99%)    PHYSICAL EXAM  GENERAL: NAD,  HEAD:  NCAT, EOMI, MM  NECK: Supple, Nontender  NERVOUS SYSTEM:  AAOx3, NFD  CHEST/LUNG: +bs b/l, No wheezing   HEART: +s1s2 RRR  ABDOMEN: soft, NT/ND  EXTREMITIES:  pp, no edema  SKIN: age related skin changes         IMPRESSION  Witnessed Syncope   suspected vasovagal Syncope    +LOC - HT, + AC  Ddx (neuro mediated vs orthostatic vs cardiovascular)  tele monitoring arrhythmic events  Check orthostatic, check eeg,  tte, tsh/t4  Consider Neuro eval     Hx  HF   Elevated  BNP  2.7k, trace  Lower Edema   Chest X-Ray  Unremarkable, 96 on RA   EKG  NSR no acute Ischemic  Changes   c/w Lasix      Asymptomatic Cystitis   UA +  Asymptomatic  Monitor       Hx HTN  Hx DLD  c/w statin  c/w lisinopril  hold carvedilol for now     Hx Anxiety  -c/w Diazepam    Seen on 10/20/23

## 2023-10-20 NOTE — H&P ADULT - NSHPLABSRESULTS_GEN_ALL_CORE
.  LABS:                         13.7   5.93  )-----------( 240      ( 20 Oct 2023 17:02 )             42.3     10-    138  |  101  |  32<H>  ----------------------------<  129<H>  4.5   |  28  |  1.3    Ca    8.9      20 Oct 2023 17:02  Mg     2.3     10-20    TPro  6.3  /  Alb  3.8  /  TBili  0.5  /  DBili  x   /  AST  55<H>  /  ALT  60<H>  /  AlkPhos  113  10-      Urinalysis Basic - ( 20 Oct 2023 18:57 )    Color: Yellow / Appearance: Cloudy / S.016 / pH: x  Gluc: x / Ketone: Negative mg/dL  / Bili: Negative / Urobili: 1.0 mg/dL   Blood: x / Protein: Trace mg/dL / Nitrite: Negative   Leuk Esterase: Moderate / RBC: 1 /HPF / WBC 15 /HPF   Sq Epi: x / Non Sq Epi: 10 /HPF / Bacteria: Few /HPF            RADIOLOGY, EKG & ADDITIONAL TESTS: Reviewed.

## 2023-10-20 NOTE — ED ADULT NURSE NOTE - NSFALLHARMRISKINTERV_ED_ALL_ED

## 2023-10-20 NOTE — H&P ADULT - NSHPPHYSICALEXAM_GEN_ALL_CORE
PHYSICAL EXAM:  GENERAL: NAD, speaks in full sentences, no signs of respiratory distress  PSYCH: AAOx3  NECK: Supple, No JVD  CHEST/LUNG: Clear to auscultation bilaterally; No wheeze; No crackles; No accessory muscles used  HEART: Regular rate and rhythm; No murmurs;   ABDOMEN: Soft, Nontender, Nondistended; Bowel sounds present; No guarding  EXTREMITIES: Trace edema  NEUROLOGY: non-focal

## 2023-10-20 NOTE — ED PROVIDER NOTE - PHYSICAL EXAMINATION
CONSTITUTIONAL: Well-developed; well-nourished; in no acute distress.   SKIN: Warm, dry  HEAD: Normocephalic; atraumatic  EYES: EOMI, normal sclera and conjunctiva. No conjunctival pallor  ENT: No nasal discharge; airway clear.  CARD:  Regular rate and rhythm. Normal S1, S2. 2+ radial, DP pulses. Capillary refill 3 sec.  RESP: No increased WOB. CTA b/l without wheezes, crackles, rhonchi  ABD: Normoactive BS. Soft, nontender, nondistended.  EXT: Normal ROM. 2+ pitting edema b/l LE.  NEURO: Alert, oriented, grossly unremarkable  PSYCH: Cooperative, appropriate. CONSTITUTIONAL: Well-developed; well-nourished; in no acute distress.   SKIN: Warm, dry  HEAD: Normocephalic; atraumatic  EYES: EOMI, normal sclera and conjunctiva. No conjunctival pallor  ENT: No nasal discharge; airway clear.  CARD:  Regular rate and rhythm. Normal S1, S2. 2+ radial, DP pulses. Capillary refill 3 sec.  RESP: No increased WOB. CTA b/l without wheezes, crackles, rhonchi  ABD: Normoactive BS. Soft, nontender, nondistended. DONA brown stool..  EXT: Normal ROM. 2+ pitting edema b/l LE.  NEURO: Alert, oriented, grossly unremarkable  PSYCH: Cooperative, appropriate.

## 2023-10-20 NOTE — ED ADULT TRIAGE NOTE - CHIEF COMPLAINT QUOTE
pt BIBA s/p syncopal episode while sitting at kitchen table. pt is having frequent PVCs and is currently feeling dizzy in triage

## 2023-10-20 NOTE — ED PROVIDER NOTE - OBJECTIVE STATEMENT
83-year-old female with PMH A-fib on Eliquis, CHF, HTN, HLD presenting for witnessed syncopal episode 83-year-old female with PMH A-fib on Eliquis, CHF, HTN, HLD presenting for witnessed syncopal episodethat occurred while sitting at the kitchen table with family. Granddaughter at bedside states that patient was well and then suddenly lost consciousness and dropped her head forward but did not her head. Family moved patient from the table to the couch at which point she woke up, granddaughter estimates she was unconscious for about 2 minutes. Patient states that she was feeling fine prior to episode and after waking up felt a little bit dizzy but that resolved within 1/2-hour. Patient was recently admitted to the hospital for CHF exacerbation; per chart review patient was evaluated by cardiology but no cardiac work-up done because family did not want to wait for the following Monday. [place] and does not recall exactly what testing she has had or when. Denies SOB, chest pain, weakness, lightheadedness, dizziness, vision change, extremity weakness, black or red stool, abdominal pain, nausea/vomiting, fever, recent illness.

## 2023-10-20 NOTE — ED PROVIDER NOTE - ATTENDING CONTRIBUTION TO CARE
I personally evaluated the patient. I reviewed the Resident’s or Physician Assistant’s note (as assigned above), and agree with the findings and plan except as documented in my note.     83 female here for syncope today witnessed by family at home. Known history of CAD with HF with recent admission, also AF on NOAC.     No injury or consequential problem since the syncope, EMS was activated and patient found to be hypotensive in the field with improvement after IVF administration.      Feels better upon arrival.     Recent admission with Rx changes after having been non compliant with diuretics and presented in heart failure.     GEN: female in no distress.   HEENT: non icteric conjunctiva pink. mucosa normal. throat normal. EOMI PERRLA  CHEST: CTA bilateral. normal work of breathing. no accessory muscle use  CV: pulses intact S1S2  ABD: soft, non rigid, no guarding noted, non distended, no rebound  EXT: FROM x 4 NVI trace edema bilaterally  NEURO: AAO 3 no focal deficits. Memory speech cognition and coordination grossly intact.   SKIN: no pallor no diaphoresis  PSYCH: normal mood and mentation     Impression: syncope    Plan: IV labs imaging supportive care and reevaluation

## 2023-10-20 NOTE — H&P ADULT - HISTORY OF PRESENT ILLNESS
83-year-old female with PMH A-fib on Eliquis, CHF, HTN, HLD presenting for witnessed syncopal episodethat occurred while sitting at the kitchen table with family. Granddaughter at bedside states that patient was well and then suddenly lost consciousness and dropped her head forward but did not her head. Family moved patient from the table to the couch at which point she woke up, granddaughter estimates she was unconscious for about 2 minutes. Patient states that she was feeling fine prior to episode and after waking up felt a little bit dizzy but that resolved within 1/2-hour. Patient was admitted to the hospital on 10/15/2023 for CHF exacerbation; Denies SOB, chest pain, weakness, lightheadedness, dizziness, vision change, extremity weakness, black or red stool, abdominal pain, nausea/vomiting, fever, recent illness.    In the ED bp was on the soft side 90/5, and as per ED note pt was bradycardiac (not documented, no ekg)  Labs showed mild transaminitis, BNP 2700, trop negative.  UA mildly +, asymptomatic   cbc and bmp unremarkable   chest xray shows mild congestion, to my read.      83-year-old female with PMH a-fib, CHF, HTN, HLD presenting for witnessed syncopal episode that occurred while sitting at the kitchen table with family. Granddaughter at bedside states that patient was well and then suddenly lost consciousness and dropped her head forward but did not her head. Family moved patient from the table to the couch at which point she woke up, granddaughter estimates she was unconscious for about 2 minutes. Patient states that she was feeling fine prior to episode and after waking up felt a little bit dizzy but that resolved within 1/2-hour. Patient was admitted to the hospital on 10/15/2023 for CHF exacerbation; Family noted that vitals taken by paramedics showed HR in the 40s and low bp. Denies SOB, chest pain, weakness, lightheadedness, dizziness, vision change, extremity weakness, black or red stool, abdominal pain, nausea/vomiting, fever, recent illness.    In the ED bp was on the soft side 90/5, other vitals stable  Labs showed mild transaminitis, BNP 2700, trop negative.  UA mildly +, asymptomatic   cbc and bmp unremarkable   chest xray shows mild congestion, to my read.

## 2023-10-20 NOTE — ED ADULT NURSE NOTE - OBJECTIVE STATEMENT
BIBA s/p syncopal episode while sitting at kitchen table. pt is having frequent PVCs and is currently feeling dizzy in triage

## 2023-10-20 NOTE — ED PROVIDER NOTE - CLINICAL SUMMARY MEDICAL DECISION MAKING FREE TEXT BOX
83F w/ PMHx CHF, HTN, HLD and remote smoking hx Recently admitted for fluid overload secondary to CHF, discharged day prior to arrival after undergoing IV diuresis presenting today with syncope.  Patient was sitting at the table with her family then proceeded to nod off and become unresponsive, no urinary incontinence, no seizure-like activity.  Family took patient to the couch where she awoke at baseline mental status.  Patient denies any chest pain shortness of breath abdominal pain vomiting diarrhea or bloody stools.  When EMS arrived patient was bradycardic to the 40s, as well as hypotensive to 80s over 40s.  Patient given a liter of fluid in the ED blood pressure is normal.  Patient is asymptomatic at this time.  Exam noted with faint crackles to bilateral bases, pitting edema to lower extremities.  EKG sinus rhythm with PACs.  QTc 495 which appears to be chronic based on previous EKGs.  Labs ordered and reviewed appears stable, admitted for further work-up of syncope which may be related to sick sinus syndrome or other cardiac causes.  Patient denies taking more medication than prescribed.

## 2023-10-20 NOTE — H&P ADULT - ASSESSMENT
83 year old female patient known to have a.fib, CHF, HTN, DL, anxiety, presented for witnessed syncope.    #Syncope  -witnessed, pt lost consciousness for 2 min, no jerky movements, no urine incontinence.  -likely cardio related, paramedics vitals showed bradycardia and low bp  -trop negative  -admit to tele  -f/u TTE  -f/u EKG  -orthostatic vitals    #Afib  -ChadsVasc 5   -not on blood thinner, unsure why  -now rate controlled, bradycardiac as per paramedics   -monitor on tele    #CHF  -f/u TTE  -trace edema  -mildly congested xray, bnp 2700, clear lungs on PE, on RA  -will c/w Oral Lasix  -f/u AM cxr    #HTN  #DL  -c/w statin  -c/w lisinopril  -will hold carvedilol for now     #Anxiety  -c/w Diazepam    MISC  DVT ppx: Lovenox  Diet: DASH  GI ppx: none  Activity: IAT     83 year old female patient known to have a.fib, CHF, HTN, DL, anxiety, presented for witnessed syncope.    #Syncope  -witnessed, pt lost consciousness for 2 min, no jerky movements, no urine incontinence.  -likely cardio related, paramedics vitals showed bradycardia and low bp  -trop negative  -admit to tele  -f/u TTE  -f/u EKG  -orthostatic vitals    #Afib  -ChadsVasc 5   -not on blood thinner, unsure why  -now rate controlled, bradycardiac as per paramedics   -monitor on tele    #CHF  -f/u TTE  -trace edema  -mildly congested xray, bnp 2700, clear lungs on PE, on RA  -will c/w Oral Lasix  -f/u AM cxr    #HTN  #DL  -c/w statin  -c/w lisinopril  -will hold carvedilol for now     #Anxiety  -c/w Diazepam    #UA +  -asymptomatic  -monitor     MISC  DVT ppx: Lovenox  Diet: DASH  GI ppx: none  Activity: IAT     83 year old female patient known to have a.fib, CHF, HTN, DL, anxiety, presented for witnessed syncope.    #Syncope  -witnessed, pt lost consciousness for 2 min, no jerky movements, no urine incontinence.  -likely cardio related, paramedics vitals showed bradycardia and low bp  -trop negative, EKG shows NSR with pacs   -admit to tele  -f/u TTE  -f/u AM, EKG  -orthostatic vitals    #Afib  -ChadsVasc 5   -not on blood thinner, unsure why  -now rate controlled, bradycardiac as per paramedics   -EKG shows NSR with pacs   -monitor on tele    #CHF  -f/u TTE  -trace edema  -mildly congested xray, bnp 2700, clear lungs on PE, on RA  -will c/w Oral Lasix  -f/u AM cxr    #HTN  #DL  -c/w statin  -c/w lisinopril  -will hold carvedilol for now     #Anxiety  -c/w Diazepam    #UA +  -asymptomatic  -monitor     MISC  DVT ppx: Lovenox  Diet: DASH  GI ppx: none  Activity: IAT

## 2023-10-21 LAB
ALBUMIN SERPL ELPH-MCNC: 3.6 G/DL — SIGNIFICANT CHANGE UP (ref 3.5–5.2)
ALBUMIN SERPL ELPH-MCNC: 3.6 G/DL — SIGNIFICANT CHANGE UP (ref 3.5–5.2)
ALP SERPL-CCNC: 99 U/L — SIGNIFICANT CHANGE UP (ref 30–115)
ALP SERPL-CCNC: 99 U/L — SIGNIFICANT CHANGE UP (ref 30–115)
ALT FLD-CCNC: 45 U/L — HIGH (ref 0–41)
ALT FLD-CCNC: 45 U/L — HIGH (ref 0–41)
ANION GAP SERPL CALC-SCNC: 14 MMOL/L — SIGNIFICANT CHANGE UP (ref 7–14)
ANION GAP SERPL CALC-SCNC: 14 MMOL/L — SIGNIFICANT CHANGE UP (ref 7–14)
AST SERPL-CCNC: 30 U/L — SIGNIFICANT CHANGE UP (ref 0–41)
AST SERPL-CCNC: 30 U/L — SIGNIFICANT CHANGE UP (ref 0–41)
BASOPHILS # BLD AUTO: 0.04 K/UL — SIGNIFICANT CHANGE UP (ref 0–0.2)
BASOPHILS # BLD AUTO: 0.04 K/UL — SIGNIFICANT CHANGE UP (ref 0–0.2)
BASOPHILS NFR BLD AUTO: 0.7 % — SIGNIFICANT CHANGE UP (ref 0–1)
BASOPHILS NFR BLD AUTO: 0.7 % — SIGNIFICANT CHANGE UP (ref 0–1)
BILIRUB SERPL-MCNC: 0.6 MG/DL — SIGNIFICANT CHANGE UP (ref 0.2–1.2)
BILIRUB SERPL-MCNC: 0.6 MG/DL — SIGNIFICANT CHANGE UP (ref 0.2–1.2)
BUN SERPL-MCNC: 26 MG/DL — HIGH (ref 10–20)
BUN SERPL-MCNC: 26 MG/DL — HIGH (ref 10–20)
CALCIUM SERPL-MCNC: 8.9 MG/DL — SIGNIFICANT CHANGE UP (ref 8.4–10.4)
CALCIUM SERPL-MCNC: 8.9 MG/DL — SIGNIFICANT CHANGE UP (ref 8.4–10.4)
CHLORIDE SERPL-SCNC: 103 MMOL/L — SIGNIFICANT CHANGE UP (ref 98–110)
CHLORIDE SERPL-SCNC: 103 MMOL/L — SIGNIFICANT CHANGE UP (ref 98–110)
CO2 SERPL-SCNC: 24 MMOL/L — SIGNIFICANT CHANGE UP (ref 17–32)
CO2 SERPL-SCNC: 24 MMOL/L — SIGNIFICANT CHANGE UP (ref 17–32)
CREAT SERPL-MCNC: 1 MG/DL — SIGNIFICANT CHANGE UP (ref 0.7–1.5)
CREAT SERPL-MCNC: 1 MG/DL — SIGNIFICANT CHANGE UP (ref 0.7–1.5)
EGFR: 55 ML/MIN/1.73M2 — LOW
EGFR: 55 ML/MIN/1.73M2 — LOW
EOSINOPHIL # BLD AUTO: 0.09 K/UL — SIGNIFICANT CHANGE UP (ref 0–0.7)
EOSINOPHIL # BLD AUTO: 0.09 K/UL — SIGNIFICANT CHANGE UP (ref 0–0.7)
EOSINOPHIL NFR BLD AUTO: 1.5 % — SIGNIFICANT CHANGE UP (ref 0–8)
EOSINOPHIL NFR BLD AUTO: 1.5 % — SIGNIFICANT CHANGE UP (ref 0–8)
GLUCOSE SERPL-MCNC: 89 MG/DL — SIGNIFICANT CHANGE UP (ref 70–99)
GLUCOSE SERPL-MCNC: 89 MG/DL — SIGNIFICANT CHANGE UP (ref 70–99)
HCT VFR BLD CALC: 39.2 % — SIGNIFICANT CHANGE UP (ref 37–47)
HCT VFR BLD CALC: 39.2 % — SIGNIFICANT CHANGE UP (ref 37–47)
HGB BLD-MCNC: 12.5 G/DL — SIGNIFICANT CHANGE UP (ref 12–16)
HGB BLD-MCNC: 12.5 G/DL — SIGNIFICANT CHANGE UP (ref 12–16)
IMM GRANULOCYTES NFR BLD AUTO: 0.2 % — SIGNIFICANT CHANGE UP (ref 0.1–0.3)
IMM GRANULOCYTES NFR BLD AUTO: 0.2 % — SIGNIFICANT CHANGE UP (ref 0.1–0.3)
LYMPHOCYTES # BLD AUTO: 2.32 K/UL — SIGNIFICANT CHANGE UP (ref 1.2–3.4)
LYMPHOCYTES # BLD AUTO: 2.32 K/UL — SIGNIFICANT CHANGE UP (ref 1.2–3.4)
LYMPHOCYTES # BLD AUTO: 38.7 % — SIGNIFICANT CHANGE UP (ref 20.5–51.1)
LYMPHOCYTES # BLD AUTO: 38.7 % — SIGNIFICANT CHANGE UP (ref 20.5–51.1)
MAGNESIUM SERPL-MCNC: 2.2 MG/DL — SIGNIFICANT CHANGE UP (ref 1.8–2.4)
MAGNESIUM SERPL-MCNC: 2.2 MG/DL — SIGNIFICANT CHANGE UP (ref 1.8–2.4)
MCHC RBC-ENTMCNC: 28.7 PG — SIGNIFICANT CHANGE UP (ref 27–31)
MCHC RBC-ENTMCNC: 28.7 PG — SIGNIFICANT CHANGE UP (ref 27–31)
MCHC RBC-ENTMCNC: 31.9 G/DL — LOW (ref 32–37)
MCHC RBC-ENTMCNC: 31.9 G/DL — LOW (ref 32–37)
MCV RBC AUTO: 89.9 FL — SIGNIFICANT CHANGE UP (ref 81–99)
MCV RBC AUTO: 89.9 FL — SIGNIFICANT CHANGE UP (ref 81–99)
MONOCYTES # BLD AUTO: 0.57 K/UL — SIGNIFICANT CHANGE UP (ref 0.1–0.6)
MONOCYTES # BLD AUTO: 0.57 K/UL — SIGNIFICANT CHANGE UP (ref 0.1–0.6)
MONOCYTES NFR BLD AUTO: 9.5 % — HIGH (ref 1.7–9.3)
MONOCYTES NFR BLD AUTO: 9.5 % — HIGH (ref 1.7–9.3)
NEUTROPHILS # BLD AUTO: 2.97 K/UL — SIGNIFICANT CHANGE UP (ref 1.4–6.5)
NEUTROPHILS # BLD AUTO: 2.97 K/UL — SIGNIFICANT CHANGE UP (ref 1.4–6.5)
NEUTROPHILS NFR BLD AUTO: 49.4 % — SIGNIFICANT CHANGE UP (ref 42.2–75.2)
NEUTROPHILS NFR BLD AUTO: 49.4 % — SIGNIFICANT CHANGE UP (ref 42.2–75.2)
NRBC # BLD: 0 /100 WBCS — SIGNIFICANT CHANGE UP (ref 0–0)
NRBC # BLD: 0 /100 WBCS — SIGNIFICANT CHANGE UP (ref 0–0)
PLATELET # BLD AUTO: 246 K/UL — SIGNIFICANT CHANGE UP (ref 130–400)
PLATELET # BLD AUTO: 246 K/UL — SIGNIFICANT CHANGE UP (ref 130–400)
PMV BLD: 9.7 FL — SIGNIFICANT CHANGE UP (ref 7.4–10.4)
PMV BLD: 9.7 FL — SIGNIFICANT CHANGE UP (ref 7.4–10.4)
POTASSIUM SERPL-MCNC: 3.9 MMOL/L — SIGNIFICANT CHANGE UP (ref 3.5–5)
POTASSIUM SERPL-MCNC: 3.9 MMOL/L — SIGNIFICANT CHANGE UP (ref 3.5–5)
POTASSIUM SERPL-SCNC: 3.9 MMOL/L — SIGNIFICANT CHANGE UP (ref 3.5–5)
POTASSIUM SERPL-SCNC: 3.9 MMOL/L — SIGNIFICANT CHANGE UP (ref 3.5–5)
PROT SERPL-MCNC: 6 G/DL — SIGNIFICANT CHANGE UP (ref 6–8)
PROT SERPL-MCNC: 6 G/DL — SIGNIFICANT CHANGE UP (ref 6–8)
RBC # BLD: 4.36 M/UL — SIGNIFICANT CHANGE UP (ref 4.2–5.4)
RBC # BLD: 4.36 M/UL — SIGNIFICANT CHANGE UP (ref 4.2–5.4)
RBC # FLD: 13.8 % — SIGNIFICANT CHANGE UP (ref 11.5–14.5)
RBC # FLD: 13.8 % — SIGNIFICANT CHANGE UP (ref 11.5–14.5)
SODIUM SERPL-SCNC: 141 MMOL/L — SIGNIFICANT CHANGE UP (ref 135–146)
SODIUM SERPL-SCNC: 141 MMOL/L — SIGNIFICANT CHANGE UP (ref 135–146)
TROPONIN T SERPL-MCNC: <0.01 NG/ML — SIGNIFICANT CHANGE UP
TROPONIN T SERPL-MCNC: <0.01 NG/ML — SIGNIFICANT CHANGE UP
WBC # BLD: 6 K/UL — SIGNIFICANT CHANGE UP (ref 4.8–10.8)
WBC # BLD: 6 K/UL — SIGNIFICANT CHANGE UP (ref 4.8–10.8)
WBC # FLD AUTO: 6 K/UL — SIGNIFICANT CHANGE UP (ref 4.8–10.8)
WBC # FLD AUTO: 6 K/UL — SIGNIFICANT CHANGE UP (ref 4.8–10.8)

## 2023-10-21 PROCEDURE — 99233 SBSQ HOSP IP/OBS HIGH 50: CPT

## 2023-10-21 PROCEDURE — 71045 X-RAY EXAM CHEST 1 VIEW: CPT | Mod: 26

## 2023-10-21 PROCEDURE — 93010 ELECTROCARDIOGRAM REPORT: CPT

## 2023-10-21 RX ADMIN — ATORVASTATIN CALCIUM 40 MILLIGRAM(S): 80 TABLET, FILM COATED ORAL at 21:25

## 2023-10-21 RX ADMIN — ENOXAPARIN SODIUM 40 MILLIGRAM(S): 100 INJECTION SUBCUTANEOUS at 21:25

## 2023-10-21 RX ADMIN — Medication 3 MILLIGRAM(S): at 03:52

## 2023-10-21 NOTE — PATIENT PROFILE ADULT - FALL HARM RISK - HARM RISK INTERVENTIONS

## 2023-10-21 NOTE — PATIENT PROFILE ADULT - FALL HARM RISK - FALL HARM RISK
as per mother, pt has fever everyday since Sunday, tmax 102, denies v/d, seen at urgent care on Tues and given Amox for unknown reason, last gave motrin at 630p,. pt playful and interactive during triage.
Age

## 2023-10-21 NOTE — PROGRESS NOTE ADULT - ASSESSMENT
84 yo F PMHx HTN, HLD, chronic HFpEF presented to ED for evaluation of syncope. Pt was sitting at table when she became increasingly less responsive until she completely lost consciousness. Her granddaughter is an EMT and performed a sternal rub. Pt grimaced to sternal rub but did not regain consciousness. When family called EMS the dispatcher instructed them to lay her on the ground flat. When she was supine she almost immediately gained consciousness.     To note pt was discharged on 10/18 after simone was treated for volume overload due to decompensated chronic HFpEF due to missing lasix dose. During that time she developed LOLA but SCr returned to baseline without fluids or holding diuretics. Pt and family requested discharge on 10/18 as pt was developing anxiety from hospitalization and states she felt fine. ECHO not done as she requested it as outpt. Prior to last hospitalization she had a mechanical fall with resultant nasal bone fracture. Suspect pt may have syncopized at that point as well??    Syncope  Likely vasovagal/orthostatic due to sitting upright in setting of dehydration from diuertics  - family states she really only ate and drank one time since being home  - orthostatics   - echo  - c/w telemetry  - bp low normal, hold lisinopril    Acute on chronic HFpEF  - c/w lasix      CKD III  - stable at bedside    Mild hyperkalemia  - resolve       HTN/HLD   - hold lisinopril given  low normal BP    DVT ppx: Lovenox SC  GI ppx: not indicated.   Diet: DASH diet with fluid restriction  Activity: as tolerated.     #Progress Note Handoff:  Pending (specify): ECHO  Family discussion: as above with pt  Disposition: Home_x__/SNF___/Other________/Unknown at this time________

## 2023-10-21 NOTE — PROGRESS NOTE ADULT - SUBJECTIVE AND OBJECTIVE BOX
CHIEF COMPLAINT:    Patient is a 83y old  Female who presents with a chief complaint of Syncope    INTERVAL HPI/OVERNIGHT EVENTS:    Patient seen and examined at bedside. No acute overnight events occurred.    ROS: Denies dizziness, SOB, chest pain, dizziness. All other systems are negative.    Medications:  Standing  atorvastatin 40 milliGRAM(s) Oral at bedtime  enoxaparin Injectable 40 milliGRAM(s) SubCutaneous every 24 hours  furosemide    Tablet 40 milliGRAM(s) Oral daily  lisinopril 10 milliGRAM(s) Oral daily    PRN Meds  diazepam    Tablet 5 milliGRAM(s) Oral at bedtime PRN  melatonin 3 milliGRAM(s) Oral at bedtime PRN        Vital Signs:    T(F): 97.3 (10-21-23 @ 04:58), Max: 98.7 (10-20-23 @ 20:04)  HR: 72 (10-21-23 @ 06:00) (58 - 87)  BP: 95/50 (10-21-23 @ 06:00) (90/50 - 138/60)  RR: 18 (10-21-23 @ 04:58) (16 - 20)  SpO2: 96% (10-20-23 @ 23:46) (93% - 99%)  I&O's Summary    Daily Height in cm: 154.94 (20 Oct 2023 15:42)    Daily   CAPILLARY BLOOD GLUCOSE      POCT Blood Glucose.: 124 mg/dL (20 Oct 2023 16:22)      PHYSICAL EXAM:  GENERAL:  NAD  SKIN: No rashes or lesions  HEENT: Atraumatic. Normocephalic. Anicteric  NECK:  No JVD.   PULMONARY: Clear to ausculation bilaterally. No wheezing. No rales  CVS: Normal S1, S2. Regular rate and rhythm. No murmurs.  ABDOMEN/GI: Soft, Nontender, Nondistended; Bowel sounds are present  EXTREMITIES:  No edema B/L LE.  NEUROLOGIC:  No motor deficit.  PSYCH: Alert & oriented x 3, normal affect      LABS:                        12.5   6.00  )-----------( 246      ( 21 Oct 2023 06:04 )             39.2     10-21    141  |  103  |  26<H>  ----------------------------<  89  3.9   |  24  |  1.0    Ca    8.9      21 Oct 2023 06:04  Mg     2.2     10-21    TPro  6.0  /  Alb  3.6  /  TBili  0.6  /  DBili  x   /  AST  30  /  ALT  45<H>  /  AlkPhos  99  10-21        Trop <0.01, CKMB --, CK --, 10-21-23 @ 00:36  Trop <0.01, CKMB --, CK --, 10-20-23 @ 17:02        RADIOLOGY & ADDITIONAL TESTS:  Imaging or report Personally Reviewed:  [ ] YES  [ ] NO -->no new images    Telemetry reviewed independently - no acute events  EKG reviewed independently -->no new EKGs    Consultant(s) Notes Reviewed:  [ ] YES  [ ] NO  Care Discussed with Consultants/Other Providers [ ] YES  [ ] NO    Case discussed with resident  Care discussed with pt

## 2023-10-22 ENCOUNTER — TRANSCRIPTION ENCOUNTER (OUTPATIENT)
Age: 83
End: 2023-10-22

## 2023-10-22 PROCEDURE — 93306 TTE W/DOPPLER COMPLETE: CPT | Mod: 26

## 2023-10-22 PROCEDURE — 99233 SBSQ HOSP IP/OBS HIGH 50: CPT

## 2023-10-22 RX ORDER — METOPROLOL TARTRATE 50 MG
12.5 TABLET ORAL DAILY
Refills: 0 | Status: DISCONTINUED | OUTPATIENT
Start: 2023-10-22 | End: 2023-10-26

## 2023-10-22 RX ADMIN — Medication 40 MILLIGRAM(S): at 05:26

## 2023-10-22 RX ADMIN — ENOXAPARIN SODIUM 40 MILLIGRAM(S): 100 INJECTION SUBCUTANEOUS at 21:21

## 2023-10-22 RX ADMIN — Medication 5 MILLIGRAM(S): at 23:29

## 2023-10-22 RX ADMIN — ATORVASTATIN CALCIUM 40 MILLIGRAM(S): 80 TABLET, FILM COATED ORAL at 21:20

## 2023-10-22 NOTE — DISCHARGE NOTE NURSING/CASE MANAGEMENT/SOCIAL WORK - PATIENT PORTAL LINK FT
You can access the FollowMyHealth Patient Portal offered by [facility] by registering at the following website: [email or web address]. By joining [email or web address]’s FollowMyHealth portal, you will also be able to view your health information using other applications (apps) compatible with our system.

## 2023-10-22 NOTE — PROGRESS NOTE ADULT - SUBJECTIVE AND OBJECTIVE BOX
CHIEF COMPLAINT:    Patient is a 83y old  Female who presents with a chief complaint of Syncope     INTERVAL HPI/OVERNIGHT EVENTS:    Patient seen and examined at bedside. No acute overnight events occurred.    ROS: Denies SOB, chest pain. All other systems are negative.    Medications:  Standing  atorvastatin 40 milliGRAM(s) Oral at bedtime  enoxaparin Injectable 40 milliGRAM(s) SubCutaneous every 24 hours  furosemide    Tablet 40 milliGRAM(s) Oral daily  metoprolol succinate ER 12.5 milliGRAM(s) Oral daily    PRN Meds  diazepam    Tablet 5 milliGRAM(s) Oral at bedtime PRN  melatonin 3 milliGRAM(s) Oral at bedtime PRN        Vital Signs:    T(F): 97.1 (10-22-23 @ 04:35), Max: 97.4 (10-21-23 @ 20:39)  HR: 72 (10-22-23 @ 04:35) (69 - 72)  BP: 97/54 (10-22-23 @ 04:35) (97/54 - 124/66)  RR: 18 (10-22-23 @ 04:35) (18 - 18)  SpO2: 94% (10-21-23 @ 20:06) (94% - 94%)  I&O's Summary    21 Oct 2023 07:01  -  22 Oct 2023 07:00  --------------------------------------------------------  IN: 826 mL / OUT: 601 mL / NET: 225 mL    22 Oct 2023 07:01  -  22 Oct 2023 14:03  --------------------------------------------------------  IN: 240 mL / OUT: 240 mL / NET: 0 mL      Daily     Daily   CAPILLARY BLOOD GLUCOSE          PHYSICAL EXAM:  GENERAL:  NAD  SKIN: No rashes or lesions  HEENT: Atraumatic. Normocephalic. Anicteric  NECK:  No JVD.   PULMONARY: Clear to ausculation bilaterally. No wheezing. No rales  CVS: Normal S1, S2. Regular rate and rhythm. No murmurs.  ABDOMEN/GI: Soft, Nontender, Nondistended; Bowel sounds are present  EXTREMITIES:  No edema B/L LE.  NEUROLOGIC:  No motor deficit.  PSYCH: Alert & oriented x 3, normal affect      LABS:                        12.5   6.00  )-----------( 246      ( 21 Oct 2023 06:04 )             39.2     10-21    141  |  103  |  26<H>  ----------------------------<  89  3.9   |  24  |  1.0    Ca    8.9      21 Oct 2023 06:04  Mg     2.2     10-21    TPro  6.0  /  Alb  3.6  /  TBili  0.6  /  DBili  x   /  AST  30  /  ALT  45<H>  /  AlkPhos  99  10-21        Trop <0.01, CKMB --, CK --, 10-21-23 @ 00:36  Trop <0.01, CKMB --, CK --, 10-20-23 @ 17:02        RADIOLOGY & ADDITIONAL TESTS:  Imaging or report Personally Reviewed:  [ ] YES  [ ] NO -->no new images    Telemetry reviewed independently - NSR, no acute events  EKG reviewed independently -->no new EKGs    Consultant(s) Notes Reviewed:  [ ] YES  [ ] NO  Care Discussed with Consultants/Other Providers [ ] YES  [ ] NO    Case discussed with resident  Care discussed with pt         Yes

## 2023-10-22 NOTE — DISCHARGE NOTE NURSING/CASE MANAGEMENT/SOCIAL WORK - NSFLUVACAGEDISCH_IMM_ALL_CORE
Adult Bed in lowest position, wheels locked, appropriate side rails in place/Call bell, personal items and telephone in reach/Instruct patient to call for assistance before getting out of bed or chair/Non-slip footwear when patient is out of bed/[place] to call system/Physically safe environment - no spills, clutter or unnecessary equipment/Purposeful Proactive Rounding/Room/bathroom lighting operational, light cord in reach

## 2023-10-22 NOTE — CONSULT NOTE ADULT - ASSESSMENT
83-year-old female with PMH a-fib, CHF, HTN, HLD presenting for witnessed syncopal episode that occurred while sitting at the kitchen table with family. Granddaughter at bedside states that patient was well and then suddenly lost consciousness. patient denies any chest pain, no sob, no palpitations, no dizziness, no nausea or warm sensation before her LOC. never happened to her in the past. she mentions that she recently was dealing with too much stress especially after her daughter's death     # Impression:  - Syncope in the setting of a recent stressful event   - hx of undefined CM ( EF 35 to 40 %, follow with Cardiology in [place]   - no findings of Takutsobo CM on Echo   - HTN, HLD  - pAfib with PAC and PVC on telemetry ( MICHAEL vasc 5 )     # Recs:  - keep on telemetry   - check orthostatics  - resume lisinopril 10 mg po od and cw lasix 40 mg PO OD   - resume carvedilol 6.25 mg po od  - patient may not seem interested in pursuing any invasive approach for her CM  - may benefit from ILR to diagnose underlying arrhythmias ( check with the patient if she is agreeable before cs EP)   - Please obtain outpatient records from her cardiologist 83-year-old female with PMH a-fib, CHF, HTN, HLD presenting for witnessed syncopal episode that occurred while sitting at the kitchen table with family. Granddaughter at bedside states that patient was well and then suddenly lost consciousness. patient denies any chest pain, no sob, no palpitations, no dizziness, no nausea or warm sensation before her LOC. never happened to her in the past. she mentions that she recently was dealing with too much stress especially after her daughter's death     # Impression:  - Syncope in the setting of a recent stressful event   - hx of undefined CM ( EF 35 to 40 %, follow with Cardiology in [place]   - no findings of Takutsobo CM on Echo   - HTN, HLD  - pAfib with PAC and PVC on telemetry ( MICHAEL vasc 5 )     # Recs:  - keep on telemetry   - check orthostatics  - resume lisinopril 10 mg po od and cw lasix 40 mg PO OD   - resume carvedilol 6.25 mg po od and increase as tolerated in view of frequent PAC and PVC  - start eliquis 5 mg po BID   - may benefit from ischemic work up during this stay    - may benefit from ILR to diagnose underlying arrhythmias ( check with the patient if she is agreeable before cs EP)   - Please obtain outpatient records from her cardiologist  - will follow up  83-year-old female with PMH a-fib, CHF, HTN, HLD presenting for witnessed syncopal episode that occurred while sitting at the kitchen table with family. Granddaughter at bedside states that patient was well and then suddenly lost consciousness. patient denies any chest pain, no sob, no palpitations, no dizziness, no nausea or warm sensation before her LOC. never happened to her in the past. she mentions that she recently was dealing with too much stress especially after her daughter's death     # Impression:  - Syncope in the setting of a recent stressful event   - hx of undefined CM ( EF 35 to 40 %, follow with Cardiology in [place]   - no findings of Takutsobo CM on Echo   - HTN, HLD  - pAfib with PAC and PVC on telemetry ( MICHAEL vasc 5 )     # Recs:  - keep on telemetry   - check orthostatics  - resume lisinopril 10 mg po od and cw lasix 40 mg PO OD   - resume carvedilol 6.25 mg, increase as tolerated in view of frequent PAC and PVC  - start eliquis 5 mg po BID   - may benefit from ischemic work up during this stay    - EP consult, may benefit from ILR to diagnose underlying arrhythmias ( check with the patient if she is agreeable before cs EP)   - Please obtain outpatient records from her cardiologist  - will follow up

## 2023-10-22 NOTE — PROGRESS NOTE ADULT - ASSESSMENT
84 yo F PMHx HTN, HLD, chronic HFpEF presented to ED for evaluation of syncope. Pt was sitting at table when she became increasingly less responsive until she completely lost consciousness. Her granddaughter is an EMT and performed a sternal rub. Pt grimaced to sternal rub but did not regain consciousness. When family called EMS the dispatcher instructed them to lay her on the ground flat. When she was supine she almost immediately gained consciousness.     To note pt was discharged on 10/18 after simone was treated for volume overload due to decompensated chronic HFpEF due to missing lasix dose. During that time she developed LOLA but SCr returned to baseline without fluids or holding diuretics. Pt and family requested discharge on 10/18 as pt was developing anxiety from hospitalization and states she felt fine. ECHO not done as she requested it as outpt. Prior to last hospitalization she had a mechanical fall with resultant nasal bone fracture. Suspect pt may have syncopized at that point as well??    Syncope  Likely vasovagal/orthostatic due to sitting upright in setting of dehydration from diuertics  - family states she really only ate and drank one time since being home  - orthostatics   - c/w telemetry  - bp low normal, hold lisinopril    Acute systolic failure  - pt compensated  - EF now 35-40%. Outpt echo was 50-55%  - cardiology eval  - possible Takusobo as pt's daughter recently passed vs ischemic  - metoprolol started,  - if bp remains stable will trial Entresto  - c/w lasix      CKD III  - stable at bedside    Mild hyperkalemia  - resolve       HTN/HLD   - hold lisinopril given  low normal BP    DVT ppx: Lovenox SC  GI ppx: not indicated.   Diet: DASH diet with fluid restriction  Activity: as tolerated.     #Progress Note Handoff:  Pending (specify): cardio eval  Family discussion: as above with pt  Disposition: Home_x__/SNF___/Other________/Unknown at this time________

## 2023-10-22 NOTE — CONSULT NOTE ADULT - SUBJECTIVE AND OBJECTIVE BOX
HPI:  83-year-old female with PMH a-fib, CHF, HTN, HLD presenting for witnessed syncopal episode that occurred while sitting at the kitchen table with family. Granddaughter at bedside states that patient was well and then suddenly lost consciousness and dropped her head forward but did not her head. Family moved patient from the table to the couch at which point she woke up, granddaughter estimates she was unconscious for about 2 minutes. Patient states that she was feeling fine prior to episode and after waking up felt a little bit dizzy but that resolved within 1/2-hour. Patient was admitted to the hospital on 10/15/2023 for CHF exacerbation; Family noted that vitals taken by paramedics showed HR in the 40s and low bp. Denies SOB, chest pain, weakness, lightheadedness, dizziness, vision change, extremity weakness, black or red stool, abdominal pain, nausea/vomiting, fever, recent illness.    In the ED bp was on the soft side 90/5, other vitals stable  Labs showed mild transaminitis, BNP 2700, trop negative.  UA mildly +, asymptomatic   cbc and bmp unremarkable   chest xray shows mild congestion, to my read.      (20 Oct 2023 20:22)    Cardiology consulted to r/o brock KING in the setting of recent stressfully event. patient mentions that her daughter passed away 10 days ago after she got diagnosed with malignancy. Echo done during this admission and showed EF 35% with no findings suggestive of Takutsobo CM        PAST MEDICAL & SURGICAL HISTORY      FAMILY HISTORY:  FAMILY HISTORY:      SOCIAL HISTORY:  []smoker  []Alcohol  []Drug    ALLERGIES:  No Known Allergies      MEDICATIONS:  MEDICATIONS  (STANDING):  atorvastatin 40 milliGRAM(s) Oral at bedtime  enoxaparin Injectable 40 milliGRAM(s) SubCutaneous every 24 hours  furosemide    Tablet 40 milliGRAM(s) Oral daily  metoprolol succinate ER 12.5 milliGRAM(s) Oral daily    MEDICATIONS  (PRN):  diazepam    Tablet 5 milliGRAM(s) Oral at bedtime PRN for insomnia  melatonin 3 milliGRAM(s) Oral at bedtime PRN Insomnia      HOME MEDICATIONS:  Home Medications:  atorvastatin 40 mg oral tablet: 1 tab(s) orally once a day (at bedtime) (20 Oct 2023 20:32)  carvedilol 6.25 mg oral tablet: 1 tab(s) orally every 12 hours (20 Oct 2023 20:32)  diazePAM 5 mg oral tablet: 1 tab(s) orally once a day (at bedtime) as needed for  insomnia (20 Oct 2023 20:32)  Lasix 40 mg oral tablet: 1 tab(s) orally once a day (20 Oct 2023 20:32)  lisinopril 10 mg oral tablet: 1 tab(s) orally once a day (20 Oct 2023 20:32)      VITALS:   T(F): 97.1 (10-22 @ 04:35), Max: 98.7 (10-20 @ 20:04)  HR: 104 (10-22 @ 16:23) (58 - 104)  BP: 127/93 (10-22 @ 16:23) (90/50 - 138/60)  BP(mean): 72 (10-22 @ 04:35) (72 - 73)  RR: 18 (10-22 @ 16:23) (16 - 20)  SpO2: 94% (10-21 @ 20:06) (93% - 99%)    I&O's Summary    21 Oct 2023 07:01  -  22 Oct 2023 07:00  --------------------------------------------------------  IN: 826 mL / OUT: 601 mL / NET: 225 mL    22 Oct 2023 07:01  -  22 Oct 2023 20:27  --------------------------------------------------------  IN: 476 mL / OUT: 240 mL / NET: 236 mL        REVIEW OF SYSTEMS:  CONSTITUTIONAL: No weakness, fevers or chills  EYES: No visual changes  ENT: No vertigo or throat pain   NECK: No pain or stiffness  RESPIRATORY: No cough, wheezing, hemoptysis; No shortness of breath  CARDIOVASCULAR: No chest pain or palpitations  GASTROINTESTINAL: No abdominal or epigastric pain. No nausea, vomiting, or hematemesis; No diarrhea or constipation. No melena or hematochezia.  GENITOURINARY: No dysuria, frequency or hematuria  NEUROLOGICAL: No numbness or weakness  SKIN: No itching, no rashes  MSK: No pain    PHYSICAL EXAM:  NEURO: patient is awake , alert and oriented  GEN: Not in acute distress  NECK: no thyroid enlargement, no JVD  LUNGS: Clear to auscultation bilaterally   CARDIOVASCULAR: Irregular S1S2  ABD: Soft, non-tender, non-distended, +BS  EXT: No JENNIFER  SKIN: Intact    LABS:                        12.5   6.00  )-----------( 246      ( 21 Oct 2023 06:04 )             39.2     10-21    141  |  103  |  26<H>  ----------------------------<  89  3.9   |  24  |  1.0    Ca    8.9      21 Oct 2023 06:04  Mg     2.2     10-21    TPro  6.0  /  Alb  3.6  /  TBili  0.6  /  DBili  x   /  AST  30  /  ALT  45<H>  /  AlkPhos  99  10-21        CARDIAC MARKERS ( 21 Oct 2023 00:36 )  x     / <0.01 ng/mL / x     / x     / x            Troponin trend:      10-16 Chol 223<H> LDL -- HDL 66 Trig 96      RADIOLOGY:  -CXR:  -TTE:   1. Moderately decreased global left ventricular systolic function. Left   ventricular ejection fraction, by visual estimation, is 35 to 40%. Mild   (Grade I) diastolic dysfunction.   2. Normal right ventricular size and function.   3. Mildly enlarged left atrium.   4. Mild mitral valve regurgitation.   5. Sclerotic aortic valve with normal opening.   6. No echocardiographic evidence of pulmonary hypertension (PASP =   33mmHg).   7. There is no evidence of pericardial effusion.   8. Frequent premature ventricular complexes noted during this study.    -CCTA:  -STRESS TEST:  -CATHETERIZATION:    ECG: NSR + PVC + PAC    TELEMETRY EVENTS: Afib with multiple PVC and pac

## 2023-10-23 LAB
CULTURE RESULTS: SIGNIFICANT CHANGE UP
CULTURE RESULTS: SIGNIFICANT CHANGE UP
SPECIMEN SOURCE: SIGNIFICANT CHANGE UP
SPECIMEN SOURCE: SIGNIFICANT CHANGE UP

## 2023-10-23 PROCEDURE — 93458 L HRT ARTERY/VENTRICLE ANGIO: CPT | Mod: 26

## 2023-10-23 PROCEDURE — ZZZZZ: CPT

## 2023-10-23 PROCEDURE — 99223 1ST HOSP IP/OBS HIGH 75: CPT

## 2023-10-23 PROCEDURE — 99233 SBSQ HOSP IP/OBS HIGH 50: CPT

## 2023-10-23 RX ORDER — ASPIRIN/CALCIUM CARB/MAGNESIUM 324 MG
324 TABLET ORAL ONCE
Refills: 0 | Status: COMPLETED | OUTPATIENT
Start: 2023-10-23 | End: 2023-10-23

## 2023-10-23 RX ORDER — SODIUM CHLORIDE 9 MG/ML
1000 INJECTION INTRAMUSCULAR; INTRAVENOUS; SUBCUTANEOUS
Refills: 0 | Status: DISCONTINUED | OUTPATIENT
Start: 2023-10-23 | End: 2023-10-26

## 2023-10-23 RX ORDER — APIXABAN 2.5 MG/1
5 TABLET, FILM COATED ORAL EVERY 12 HOURS
Refills: 0 | Status: DISCONTINUED | OUTPATIENT
Start: 2023-10-23 | End: 2023-10-25

## 2023-10-23 RX ORDER — LISINOPRIL 2.5 MG/1
10 TABLET ORAL DAILY
Refills: 0 | Status: DISCONTINUED | OUTPATIENT
Start: 2023-10-23 | End: 2023-10-24

## 2023-10-23 RX ADMIN — ATORVASTATIN CALCIUM 40 MILLIGRAM(S): 80 TABLET, FILM COATED ORAL at 21:35

## 2023-10-23 RX ADMIN — Medication 12.5 MILLIGRAM(S): at 05:15

## 2023-10-23 RX ADMIN — Medication 40 MILLIGRAM(S): at 05:16

## 2023-10-23 RX ADMIN — LISINOPRIL 10 MILLIGRAM(S): 2.5 TABLET ORAL at 10:19

## 2023-10-23 RX ADMIN — Medication 324 MILLIGRAM(S): at 18:05

## 2023-10-23 NOTE — CONSULT NOTE ADULT - NS ATTEND AMEND GEN_ALL_CORE FT
Dilated cardiomyoptahy- recovered- now low again  CAD (last cath 2018)  Syncope    Syncope highly concerning for cardiac etiology  Prior LHC (2018) and TTE (02/23) reviewed  EKG, Tele reviewed  Recent admission for fluid overload    Needs [facility]  Tele  Further plan EP study/ICD vs ILR based upon [facility] findings  No evidence of AF so far. ? unclear history. Will hold off on OAC for now considering unclear history and interventions bein gplanned.  Social service c/s- patient request

## 2023-10-23 NOTE — PROGRESS NOTE ADULT - SUBJECTIVE AND OBJECTIVE BOX
CHIEF COMPLAINT:    Patient is a 83y old  Female who presents with a chief complaint of Syncope    INTERVAL HPI/OVERNIGHT EVENTS:    Patient seen and examined at bedside. No acute overnight events occurred.    ROS: Denies SOB, chest pain. All other systems are negative.    Medications:  Standing  apixaban 5 milliGRAM(s) Oral every 12 hours  atorvastatin 40 milliGRAM(s) Oral at bedtime  furosemide    Tablet 40 milliGRAM(s) Oral daily  lisinopril 10 milliGRAM(s) Oral daily  metoprolol succinate ER 12.5 milliGRAM(s) Oral daily    PRN Meds  diazepam    Tablet 5 milliGRAM(s) Oral at bedtime PRN  melatonin 3 milliGRAM(s) Oral at bedtime PRN        Vital Signs:    T(F): 97.7 (10-23-23 @ 05:15), Max: 98.3 (10-22-23 @ 21:08)  HR: 76 (10-23-23 @ 05:15) (64 - 104)  BP: 115/74 (10-23-23 @ 05:15) (115/62 - 127/93)  RR: 18 (10-23-23 @ 05:15) (18 - 18)  SpO2: 96% (10-23-23 @ 05:15) (96% - 96%)  I&O's Summary    22 Oct 2023 07:01  -  23 Oct 2023 07:00  --------------------------------------------------------  IN: 476 mL / OUT: 340 mL / NET: 136 mL    23 Oct 2023 07:01  -  23 Oct 2023 11:57  --------------------------------------------------------  IN: 360 mL / OUT: 0 mL / NET: 360 mL      PHYSICAL EXAM:  GENERAL:  NAD  SKIN: No rashes or lesions  HEENT: Atraumatic. Normocephalic. Anicteric  NECK:  No JVD.   PULMONARY: Clear to ausculation bilaterally. No wheezing. No rales  CVS: Normal S1, S2. Regular rate and rhythm. No murmurs.  ABDOMEN/GI: Soft, Nontender, Nondistended; Bowel sounds are present  EXTREMITIES:  No edema B/L LE.  NEUROLOGIC:  No motor deficit.  PSYCH: Alert & oriented x 3, normal affect      LABS:              Trop <0.01, CKMB --, CK --, 10-21-23 @ 00:36  Trop <0.01, CKMB --, CK --, 10-20-23 @ 17:02      Culture - Urine (collected 20 Oct 2023 18:57)  Source: Clean Catch Clean Catch (Midstream)  Final Report (23 Oct 2023 10:56):    >=3 organisms. Probable collection contamination.        RADIOLOGY & ADDITIONAL TESTS:  Imaging or report Personally Reviewed:  [ ] YES  [ ] NO -->no new images    Telemetry reviewed independently - NSR, no acute events  EKG reviewed independently -->no new EKGs    Consultant(s) Notes Reviewed:  [ ] YES  [ ] NO  Care Discussed with Consultants/Other Providers [ ] YES  [ ] NO    Case discussed with resident  Care discussed with pt

## 2023-10-23 NOTE — CHART NOTE - NSCHARTNOTEFT_GEN_A_CORE
PRE-OP DIAGNOSIS:  New HFrEF      PROCEDURE:     [X] Coronary Angiogram     [X] LHC     [] LVG     [] RHC     [] Intervention (see below)         PHYSICIAN:  Dr. Jason    ASSISTANT:  Dr. Chris Brown       PROCEDURE DESCRIPTION:     Consent:      [X] Patient     [] Family Member     []  Used        Anesthesia:     [X] General     [] Sedation     [X] Local        Access & Closure:     [X] 6 Fr R Radial Artery     [] Fr Femoral Artery     [] Fr Femoral Vein     [] Fr Brachial Vein       IV Contrast: 35 mL        Intervention: NOne      Implants: NOne       FINDINGS:     Coronary Dominance: Right dominate      LM: No significant disease    LAD: No significant disease    CX: No significant disease    RCA: No significant disease     LVEDP:14  mmHg       ESTIMATED BLOOD LOSS: < 10 mL        CONDITION:     [X] Good     [] Fair     [] Critical        SPECIMEN REMOVED: N/A       POST-OP DIAGNOSIS:      - Non-ischemic cardiomyopathy       PLAN OF CARE:     [] D/C Home Today     [X] Return to In-patient bed     [] Admit for observation     [] Return for Staged Procedure     [] CT Surgery Consult     [X] Medications: GDMT HFrEF    [X] IV Fluids: NS 100cc/hr x 4hrs

## 2023-10-23 NOTE — PROGRESS NOTE ADULT - ASSESSMENT
84 yo F PMHx HTN, HLD, chronic HFpEF presented to ED for evaluation of syncope. Pt was sitting at table when she became increasingly less responsive until she completely lost consciousness. Her granddaughter is an EMT and performed a sternal rub. Pt grimaced to sternal rub but did not regain consciousness. When family called EMS the dispatcher instructed them to lay her on the ground flat. When she was supine she almost immediately gained consciousness.     To note pt was discharged on 10/18 after simone was treated for volume overload due to decompensated chronic HFpEF due to missing lasix dose. During that time she developed LOLA but SCr returned to baseline without fluids or holding diuretics. Pt and family requested discharge on 10/18 as pt was developing anxiety from hospitalization and states she felt fine. ECHO not done as she requested it as outpt. Prior to last hospitalization she had a mechanical fall with resultant nasal bone fracture. Suspect pt may have syncopized at that point as well??    Syncope  - possible cardiogenic? ECHO shows systolic failure  - family states she really only ate and drank one time since being home  - orthostatics   - c/w telemetry  - bp low normal, hold lisinopril    Acute systolic failure  - pt compensated  - EF now 35-40%. Outpt echo was 50-55%  - cardiology eval  - possible [place] as pt's daughter recently passed vs ischemic  - metoprolol started,  - if bp remains stable will trial Entresto  - c/w lasix  - I spoke with cardiology attending. He recommended EP eval prior to discussing ischemic work up. Follow up EP consult      CKD III  - stable at bedside    Mild hyperkalemia  - resolve       HTN/HLD   - hold lisinopril given  low normal BP    DVT ppx: Lovenox SC  GI ppx: not indicated.   Diet: DASH diet with fluid restriction  Activity: as tolerated.     #Progress Note Handoff:  Pending (specify): EP eval  Family discussion: as above with pt-daughter aware of plan as per my conversation with her yesteday.   Disposition: Home_x__/SNF___/Other________/Unknown at this time________

## 2023-10-23 NOTE — CONSULT NOTE ADULT - SUBJECTIVE AND OBJECTIVE BOX
Patient is a 83y old  Female who presents with a chief complaint of Syncope (22 Oct 2023 20:27)        HPI:  83-year-old female with PMH a-fib, CHF, HTN, HLD presenting for witnessed syncopal episode that occurred while sitting at the kitchen table with family. Granddaughter at bedside states that patient was well and then suddenly lost consciousness and dropped her head forward but did not her head. Family moved patient from the table to the couch at which point she woke up, granddaughter estimates she was unconscious for about 2 minutes. Patient states that she was feeling fine prior to episode and after waking up felt a little bit dizzy but that resolved within 1/2-hour. Patient was admitted to the hospital on 10/15/2023 for CHF exacerbation; Family noted that vitals taken by paramedics showed HR in the 40s and low bp. Denies SOB, chest pain, weakness, lightheadedness, dizziness, vision change, extremity weakness, black or red stool, abdominal pain, nausea/vomiting, fever, recent illness.    In the ED bp was on the soft side 90/5, other vitals stable  Labs showed mild transaminitis, BNP 2700, trop negative.  UA mildly +, asymptomatic   cbc and bmp unremarkable   chest xray shows mild congestion, to my read.      (20 Oct 2023 20:22)      Electrophysiology:  83y Female    REVIEW OF SYSTEMS    [ ] A ten-point review of systems was otherwise negative except as noted.  [ ] Due to altered mental status/intubation, subjective information were not able to be obtained from the patient. History was obtained, to the extent possible, from review of the chart and collateral sources of information.      PAST MEDICAL & SURGICAL HISTORY:      Home Medications:  atorvastatin 40 mg oral tablet: 1 tab(s) orally once a day (at bedtime) (20 Oct 2023 20:32)  carvedilol 6.25 mg oral tablet: 1 tab(s) orally every 12 hours (20 Oct 2023 20:32)  diazePAM 5 mg oral tablet: 1 tab(s) orally once a day (at bedtime) as needed for  insomnia (20 Oct 2023 20:32)  Lasix 40 mg oral tablet: 1 tab(s) orally once a day (20 Oct 2023 20:32)  lisinopril 10 mg oral tablet: 1 tab(s) orally once a day (20 Oct 2023 20:32)      Allergies:  No Known Allergies      FAMILY HISTORY:      SOCIAL HISTORY:    CIGARETTES:  ALCOHOL:  MARIJUANA:  ILLICIT DRUGS:        PREVIOUS DIAGNOSTIC TESTING:      ECHO  FINDINGS:    STRESS  FINDINGS:    CATHETERIZATION  FINDINGS:    ELECTROPHYSIOLOGY STUDY  FINDINGS:    CAROTID ULTRASOUND:  FINDINGS    VENOUS DUPLEX SCAN:  FINDINGS:    CHEST CT PULMONARY ANGIO with IV Contrast:  FINDINGS:      MEDICATIONS  (STANDING):  apixaban 5 milliGRAM(s) Oral every 12 hours  atorvastatin 40 milliGRAM(s) Oral at bedtime  furosemide    Tablet 40 milliGRAM(s) Oral daily  lisinopril 10 milliGRAM(s) Oral daily  metoprolol succinate ER 12.5 milliGRAM(s) Oral daily    MEDICATIONS  (PRN):  diazepam    Tablet 5 milliGRAM(s) Oral at bedtime PRN for insomnia  melatonin 3 milliGRAM(s) Oral at bedtime PRN Insomnia      Vital Signs Last 24 Hrs  T(C): 36.5 (23 Oct 2023 05:15), Max: 36.8 (22 Oct 2023 21:08)  T(F): 97.7 (23 Oct 2023 05:15), Max: 98.3 (22 Oct 2023 21:08)  HR: 76 (23 Oct 2023 05:15) (64 - 104)  BP: 115/74 (23 Oct 2023 05:15) (115/62 - 127/93)  BP(mean): 87 (23 Oct 2023 05:15) (87 - 87)  RR: 18 (23 Oct 2023 05:15) (18 - 18)  SpO2: 96% (23 Oct 2023 05:15) (96% - 96%)        PHYSICAL EXAM:    GENERAL: In no apparent distress, well nourished, and hydrated.  HEAD:  Atraumatic, Normocephalic  EYES: EOMI, PERRLA, conjunctiva and sclera clear  NECK: Supple and normal thyroid.  No JVD or carotid bruit.  Carotid pulse is 2+ bilaterally.  HEART: Regular rate and rhythm; No murmurs, rubs, or gallops.  PULMONARY: Clear to auscultation and perfusion.  No rales, wheezing, or rhonchi bilaterally.  ABDOMEN: Soft, Nontender, Nondistended; Bowel sounds present  EXTREMITIES:  2+ Peripheral Pulses, No clubbing, cyanosis, or edema  NEUROLOGICAL: Grossly nonfocal    I&O's Detail    22 Oct 2023 07:01  -  23 Oct 2023 07:00  --------------------------------------------------------  IN:    Oral Fluid: 476 mL  Total IN: 476 mL    OUT:    Voided (mL): 340 mL  Total OUT: 340 mL    Total NET: 136 mL        Daily     Daily     INTERPRETATION OF TELEMETRY:    EC Lead ECG:   Ventricular Rate 82 BPM    Atrial Rate 82 BPM    P-R Interval 184 ms    QRS Duration 104 ms    Q-T Interval 426 ms    QTC Calculation(Bazett) 497 ms    P Axis 91 degrees    R Axis 72 degrees    T Axis 212 degrees    Diagnosis Line Sinus rhythm withfrequent and consecutive Premature ventricular complexes and   Premature atrial complexes  Nonspecific T wave abnormality  Prolonged QT  Abnormal ECG    Confirmed by Froilan Norris ([id number]) on 10/21/2023 12:54:05 PM (10-21-23 @ 08:34)  12 Lead ECG:   Ventricular Rate 73 BPM    Atrial Rate 73 BPM    P-R Interval 186 ms    QRS Duration 104 ms    Q-T Interval 450 ms    QTC Calculation(Bazett) 495 ms    P Axis 83 degrees    R Axis 77 degrees    T Axis 196 degrees    Diagnosis Line Sinus rhythm withPremature atrial complexes  T wave abnormality, consider inferolateral ischemia  Prolonged QT  Abnormal ECG    Confirmed by Froilan Norris ([id number]) on 10/21/2023 3:41:33 PM (10-20-23 @ 16:29)        LABS:                  BNP            RADIOLOGY & ADDITIONAL STUDIES:       Patient is a 83y old  Female who presents with a chief complaint of Syncope (22 Oct 2023 20:27)        HPI:  83-year-old female with PMH a-fib, CHF, HTN, HLD presenting for witnessed syncopal episode that occurred while sitting at the kitchen table with family. Granddaughter at bedside states that patient was well and then suddenly lost consciousness and dropped her head forward but did not her head. Family moved patient from the table to the couch at which point she woke up, granddaughter estimates she was unconscious for about 2 minutes. Patient states that she was feeling fine prior to episode and after waking up felt a little bit dizzy but that resolved within 1/2-hour. Patient was admitted to the hospital on 10/15/2023 for CHF exacerbation; Family noted that vitals taken by paramedics showed HR in the 40s and low bp. Denies SOB, chest pain, weakness, lightheadedness, dizziness, vision change, extremity weakness, black or red stool, abdominal pain, nausea/vomiting, fever, recent illness.    In the ED bp was on the soft side 90/5, other vitals stable  Labs showed mild transaminitis, BNP 2700, trop negative.  UA mildly +, asymptomatic   cbc and bmp unremarkable   chest xray shows mild congestion, to my read.      (20 Oct 2023 20:22)      Electrophysiology:  83y Female with HTN, HLD, recent admission for dyspnea, Echo was not done at that time, patient was diuresed and discharged per pt and family insistence. On the day after discharge had witnessed syncopal episode while sitting at dinner table at home.    Per chart h/o ?AFib, never been on AC besides ASA, was having some kind of procedure to be done in [place] but cancelled. At that time patient was having "a lot of anxiety". No AF on tele  Pt had Echo that revealed EF 35-40%, no prior studies available  Patient is following with cardiologist in [place], had echo and stress test prior      REVIEW OF SYSTEMS    [x ] A ten-point review of systems was otherwise negative except as noted.  [ ] Due to altered mental status/intubation, subjective information were not able to be obtained from the patient. History was obtained, to the extent possible, from review of the chart and collateral sources of information.      PAST MEDICAL & SURGICAL HISTORY:      Home Medications:  atorvastatin 40 mg oral tablet: 1 tab(s) orally once a day (at bedtime) (20 Oct 2023 20:32)  carvedilol 6.25 mg oral tablet: 1 tab(s) orally every 12 hours (20 Oct 2023 20:32)  diazePAM 5 mg oral tablet: 1 tab(s) orally once a day (at bedtime) as needed for  insomnia (20 Oct 2023 20:32)  Lasix 40 mg oral tablet: 1 tab(s) orally once a day (20 Oct 2023 20:32)  lisinopril 10 mg oral tablet: 1 tab(s) orally once a day (20 Oct 2023 20:32)      Allergies:  No Known Allergies      FAMILY HISTORY: NC      SOCIAL HISTORY: denies tobacco / ETOH / illicit drug use     CIGARETTES:  ALCOHOL:  MARIJUANA:  ILLICIT DRUGS:        PREVIOUS DIAGNOSTIC TESTING:      ECHO  FINDINGS:  < from: TTE Echo Complete w/o Contrast w/ Doppler (10.22.23 @ 10:30) >  Summary:   1. Moderately decreased global left ventricular systolic function. Left   ventricular ejection fraction, by visual estimation, is 35 to 40%. Mild   (Grade I) diastolic dysfunction.   2. Normal right ventricular size and function.   3. Mildly enlarged left atrium.   4. Mild mitral valve regurgitation.   5. Sclerotic aortic valve with normal opening.   6. No echocardiographic evidence of pulmonary hypertension (PASP =   33mmHg).   7. There is no evidence of pericardial effusion.   8. Frequent premature ventricular complexes noted during this study.    < end of copied text >      STRESS  FINDINGS:    CATHETERIZATION  FINDINGS:    ELECTROPHYSIOLOGY STUDY  FINDINGS:    CAROTID ULTRASOUND:  FINDINGS    VENOUS DUPLEX SCAN:  FINDINGS:  < from: VA Duplex Lower Ext Vein Scan, Bilat (10.15.23 @ 14:19) >  Impression:    No evidence of deep venous thrombosis or superficial thrombophlebitis in   the bilateral lower extremities.    < end of copied text >      CHEST CT PULMONARY ANGIO with IV Contrast:  FINDINGS:    < from: CT Angio Chest PE Protocol w/ IV Cont (10.15.23 @ 17:10) >  IMPRESSION:    No CTA evidence of acute pulmonary embolus.    Reflux of contrast into the inferior vena cava and hepatic veins. Small   bilateral pleural effusions and mild mosaic lung attenuation. Findings   are compatible with right heart failure.    < end of copied text >      MEDICATIONS  (STANDING):  apixaban 5 milliGRAM(s) Oral every 12 hours  atorvastatin 40 milliGRAM(s) Oral at bedtime  furosemide    Tablet 40 milliGRAM(s) Oral daily  lisinopril 10 milliGRAM(s) Oral daily  metoprolol succinate ER 12.5 milliGRAM(s) Oral daily    MEDICATIONS  (PRN):  diazepam    Tablet 5 milliGRAM(s) Oral at bedtime PRN for insomnia  melatonin 3 milliGRAM(s) Oral at bedtime PRN Insomnia      Vital Signs Last 24 Hrs  T(C): 36.5 (23 Oct 2023 05:15), Max: 36.8 (22 Oct 2023 21:08)  T(F): 97.7 (23 Oct 2023 05:15), Max: 98.3 (22 Oct 2023 21:08)  HR: 76 (23 Oct 2023 05:15) (64 - 104)  BP: 115/74 (23 Oct 2023 05:15) (115/62 - 127/93)  BP(mean): 87 (23 Oct 2023 05:15) (87 - 87)  RR: 18 (23 Oct 2023 05:15) (18 - 18)  SpO2: 96% (23 Oct 2023 05:15) (96% - 96%)        PHYSICAL EXAM:    GENERAL: In no apparent distress, well nourished, and hydrated.  HEAD:  Atraumatic, Normocephalic  EYES: EOMI, PERRLA, conjunctiva and sclera clear  NECK: Supple and normal thyroid.  No JVD or carotid bruit.  Carotid pulse is 2+ bilaterally.  HEART: Regular rate and rhythm; No murmurs, rubs, or gallops.  PULMONARY: Clear to auscultation and perfusion.  No rales, wheezing, or rhonchi bilaterally.  ABDOMEN: Soft, Nontender, Nondistended; Bowel sounds present  EXTREMITIES:  2+ Peripheral Pulses, No clubbing, cyanosis, or edema  NEUROLOGICAL: Grossly nonfocal    I&O's Detail    22 Oct 2023 07:01  -  23 Oct 2023 07:00  --------------------------------------------------------  IN:    Oral Fluid: 476 mL  Total IN: 476 mL    OUT:    Voided (mL): 340 mL  Total OUT: 340 mL    Total NET: 136 mL        Daily     Daily     INTERPRETATION OF TELEMETRY:    EC Lead ECG:   Ventricular Rate 82 BPM    Atrial Rate 82 BPM    P-R Interval 184 ms    QRS Duration 104 ms    Q-T Interval 426 ms    QTC Calculation(Bazett) 497 ms    P Axis 91 degrees    R Axis 72 degrees    T Axis 212 degrees    Diagnosis Line Sinus rhythm withfrequent and consecutive Premature ventricular complexes and   Premature atrial complexes  Nonspecific T wave abnormality  Prolonged QT  Abnormal ECG    Confirmed by Froilan Norris ([id number]) on 10/21/2023 12:54:05 PM (10-21-23 @ 08:34)  12 Lead ECG:   Ventricular Rate 73 BPM    Atrial Rate 73 BPM    P-R Interval 186 ms    QRS Duration 104 ms    Q-T Interval 450 ms    QTC Calculation(Bazett) 495 ms    P Axis 83 degrees    R Axis 77 degrees    T Axis 196 degrees    Diagnosis Line Sinus rhythm withPremature atrial complexes  T wave abnormality, consider inferolateral ischemia  Prolonged QT  Abnormal ECG    Confirmed by Froilan Norris ([id number]) on 10/21/2023 3:41:33 PM (10-20-23 @ 16:29)        LABS:                  BNP            RADIOLOGY & ADDITIONAL STUDIES:

## 2023-10-23 NOTE — PROGRESS NOTE ADULT - SUBJECTIVE AND OBJECTIVE BOX
Brief Interval History  Patient is a 83y old Female who presents with a chief complaint of Syncope (23 Oct 2023 09:49)    URIEL MCDANIEL is admitted with a primary diagnosis of Syncope      Today is hospital day 3d. The patient was examined at the bedside this morning and appeared comfortable. No acute overnight events were reported.    Admission HPI  HPI:  83-year-old female with PMH a-fib, CHF, HTN, HLD presenting for witnessed syncopal episode that occurred while sitting at the kitchen table with family. Granddaughter at bedside states that patient was well and then suddenly lost consciousness and dropped her head forward but did not her head. Family moved patient from the table to the couch at which point she woke up, granddaughter estimates she was unconscious for about 2 minutes. Patient states that she was feeling fine prior to episode and after waking up felt a little bit dizzy but that resolved within 1/2-hour. Patient was admitted to the hospital on 10/15/2023 for CHF exacerbation; Family noted that vitals taken by paramedics showed HR in the 40s and low bp. Denies SOB, chest pain, weakness, lightheadedness, dizziness, vision change, extremity weakness, black or red stool, abdominal pain, nausea/vomiting, fever, recent illness.    In the ED bp was on the soft side 90/5, other vitals stable  Labs showed mild transaminitis, BNP 2700, trop negative.  UA mildly +, asymptomatic   cbc and bmp unremarkable   chest xray shows mild congestion, to my read.      (20 Oct 2023 20:22)      Past Medical and Surgical History    Social History  Social History:      Allergies  No Known Allergies    Medications  Standing Medications  apixaban 5 milliGRAM(s) Oral every 12 hours  atorvastatin 40 milliGRAM(s) Oral at bedtime  furosemide    Tablet 40 milliGRAM(s) Oral daily  lisinopril 10 milliGRAM(s) Oral daily  metoprolol succinate ER 12.5 milliGRAM(s) Oral daily    PRN Medications  diazepam    Tablet 5 milliGRAM(s) Oral at bedtime PRN  melatonin 3 milliGRAM(s) Oral at bedtime PRN    Vitals  T(F): 97.7  HR: 76  BP: 115/74  RR: 18  SpO2: 96%    I&O's    10-22-23 @ 07:01  -  10-23-23 @ 07:00  --------------------------------------------------------  IN: 476 mL / OUT: 340 mL / NET: 136 mL    10-23-23 @ 07:01  -  10-23-23 @ 10:30  --------------------------------------------------------  IN: 360 mL / OUT: 0 mL / NET: 360 mL      Physical Examination  General: NAD, lying in bed comfortably  Head: NCAT  Neck: Supple, no JVD  Cardiac: Regular rate and rhythm. No murmurs, gallops, or rubs  Pulmonary: CTAB  Abdominal: Soft, nontender, and nondistended with positive bowel sounds  Extremities: No pitting edema  Neurologic: AOx3, nonfocal  Skin: No rashes or lesions    Labs        Radiology  CXR  Xray Chest 1 View AP/PA:   ACC: [id number] EXAM:  XR CHEST 1 VIEW   ORDERED BY: WANDA JOSEPH     PROCEDURE DATE:  10/20/2023          INTERPRETATION:  Clinical History / Reason for exam: Seizure.    Comparison : Chest radiograph 10/18/2023.    Technique/Positioning: Single frontal chest x-ray obtained.    Findings:    Support devices: None.    Cardiac/mediastinum/hilum: Unchanged.    Lung parenchyma/Pleura: Decreased bibasilar opacities, pleural effusions.    Skeleton/soft tissues: Unchanged.    Impression:    Decreased bibasilar opacities, pleural effusions.        --- End of Report ---            SEAMUS YAÑEZ MD; Attending Radiologist  This document has been electronically signed. Oct 21 2023  3:34PM (10-20-23 @ 17:41)      CT

## 2023-10-24 ENCOUNTER — TRANSCRIPTION ENCOUNTER (OUTPATIENT)
Age: 83
End: 2023-10-24

## 2023-10-24 DIAGNOSIS — T46.4X6A UNDERDOSING OF ANGIOTENSIN-CONVERTING-ENZYME INHIBITORS, INITIAL ENCOUNTER: ICD-10-CM
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
## 2023-10-24 DIAGNOSIS — E87.5 HYPERKALEMIA: ICD-10-CM

## 2023-10-24 DIAGNOSIS — S02.2XXA FRACTURE OF NASAL BONES, INITIAL ENCOUNTER FOR CLOSED FRACTURE: ICD-10-CM

## 2023-10-24 DIAGNOSIS — I42.9 CARDIOMYOPATHY, UNSPECIFIED: ICD-10-CM

## 2023-10-24 DIAGNOSIS — I13.0 HYPERTENSIVE HEART AND CHRONIC KIDNEY DISEASE WITH HEART FAILURE AND STAGE 1 THROUGH STAGE 4 CHRONIC KIDNEY DISEASE, OR UNSPECIFIED CHRONIC KIDNEY DISEASE: ICD-10-CM

## 2023-10-24 DIAGNOSIS — T50.1X6A UNDERDOSING OF LOOP [HIGH-CEILING] DIURETICS, INITIAL ENCOUNTER: ICD-10-CM

## 2023-10-24 DIAGNOSIS — I49.3 VENTRICULAR PREMATURE DEPOLARIZATION: ICD-10-CM

## 2023-10-24 DIAGNOSIS — J96.01 ACUTE RESPIRATORY FAILURE WITH HYPOXIA: ICD-10-CM

## 2023-10-24 DIAGNOSIS — I49.1 ATRIAL PREMATURE DEPOLARIZATION: ICD-10-CM

## 2023-10-24 DIAGNOSIS — T44.7X6A UNDERDOSING OF BETA-ADRENORECEPTOR ANTAGONISTS, INITIAL ENCOUNTER: ICD-10-CM

## 2023-10-24 DIAGNOSIS — J06.9 ACUTE UPPER RESPIRATORY INFECTION, UNSPECIFIED: ICD-10-CM

## 2023-10-24 DIAGNOSIS — N17.9 ACUTE KIDNEY FAILURE, UNSPECIFIED: ICD-10-CM

## 2023-10-24 DIAGNOSIS — E78.5 HYPERLIPIDEMIA, UNSPECIFIED: ICD-10-CM

## 2023-10-24 DIAGNOSIS — Z91.148 PATIENT'S OTHER NONCOMPLIANCE WITH MEDICATION REGIMEN FOR OTHER REASON: ICD-10-CM

## 2023-10-24 DIAGNOSIS — N18.30 CHRONIC KIDNEY DISEASE, STAGE 3 UNSPECIFIED: ICD-10-CM

## 2023-10-24 DIAGNOSIS — B97.89 OTHER VIRAL AGENTS AS THE CAUSE OF DISEASES CLASSIFIED ELSEWHERE: ICD-10-CM
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
## 2023-10-24 DIAGNOSIS — I50.33 ACUTE ON CHRONIC DIASTOLIC (CONGESTIVE) HEART FAILURE: ICD-10-CM

## 2023-10-24 DIAGNOSIS — B97.19 OTHER ENTEROVIRUS AS THE CAUSE OF DISEASES CLASSIFIED ELSEWHERE: ICD-10-CM

## 2023-10-24 LAB
ALBUMIN SERPL ELPH-MCNC: 4 G/DL — SIGNIFICANT CHANGE UP (ref 3.5–5.2)
ALBUMIN SERPL ELPH-MCNC: 4 G/DL — SIGNIFICANT CHANGE UP (ref 3.5–5.2)
ALP SERPL-CCNC: 79 U/L — SIGNIFICANT CHANGE UP (ref 30–115)
ALP SERPL-CCNC: 79 U/L — SIGNIFICANT CHANGE UP (ref 30–115)
ALT FLD-CCNC: 27 U/L — SIGNIFICANT CHANGE UP (ref 0–41)
ALT FLD-CCNC: 27 U/L — SIGNIFICANT CHANGE UP (ref 0–41)
ANION GAP SERPL CALC-SCNC: 11 MMOL/L — SIGNIFICANT CHANGE UP (ref 7–14)
ANION GAP SERPL CALC-SCNC: 11 MMOL/L — SIGNIFICANT CHANGE UP (ref 7–14)
AST SERPL-CCNC: 19 U/L — SIGNIFICANT CHANGE UP (ref 0–41)
AST SERPL-CCNC: 19 U/L — SIGNIFICANT CHANGE UP (ref 0–41)
BASOPHILS # BLD AUTO: 0.04 K/UL — SIGNIFICANT CHANGE UP (ref 0–0.2)
BASOPHILS # BLD AUTO: 0.04 K/UL — SIGNIFICANT CHANGE UP (ref 0–0.2)
BASOPHILS NFR BLD AUTO: 0.6 % — SIGNIFICANT CHANGE UP (ref 0–1)
BASOPHILS NFR BLD AUTO: 0.6 % — SIGNIFICANT CHANGE UP (ref 0–1)
BILIRUB SERPL-MCNC: 0.3 MG/DL — SIGNIFICANT CHANGE UP (ref 0.2–1.2)
BILIRUB SERPL-MCNC: 0.3 MG/DL — SIGNIFICANT CHANGE UP (ref 0.2–1.2)
BUN SERPL-MCNC: 33 MG/DL — HIGH (ref 10–20)
BUN SERPL-MCNC: 33 MG/DL — HIGH (ref 10–20)
CALCIUM SERPL-MCNC: 8.7 MG/DL — SIGNIFICANT CHANGE UP (ref 8.4–10.5)
CALCIUM SERPL-MCNC: 8.7 MG/DL — SIGNIFICANT CHANGE UP (ref 8.4–10.5)
CHLORIDE SERPL-SCNC: 104 MMOL/L — SIGNIFICANT CHANGE UP (ref 98–110)
CHLORIDE SERPL-SCNC: 104 MMOL/L — SIGNIFICANT CHANGE UP (ref 98–110)
CO2 SERPL-SCNC: 27 MMOL/L — SIGNIFICANT CHANGE UP (ref 17–32)
CO2 SERPL-SCNC: 27 MMOL/L — SIGNIFICANT CHANGE UP (ref 17–32)
CREAT SERPL-MCNC: 1.1 MG/DL — SIGNIFICANT CHANGE UP (ref 0.7–1.5)
CREAT SERPL-MCNC: 1.1 MG/DL — SIGNIFICANT CHANGE UP (ref 0.7–1.5)
EGFR: 49 ML/MIN/1.73M2 — LOW
EGFR: 49 ML/MIN/1.73M2 — LOW
EOSINOPHIL # BLD AUTO: 0.13 K/UL — SIGNIFICANT CHANGE UP (ref 0–0.7)
EOSINOPHIL # BLD AUTO: 0.13 K/UL — SIGNIFICANT CHANGE UP (ref 0–0.7)
EOSINOPHIL NFR BLD AUTO: 2.1 % — SIGNIFICANT CHANGE UP (ref 0–8)
EOSINOPHIL NFR BLD AUTO: 2.1 % — SIGNIFICANT CHANGE UP (ref 0–8)
GLUCOSE SERPL-MCNC: 89 MG/DL — SIGNIFICANT CHANGE UP (ref 70–99)
GLUCOSE SERPL-MCNC: 89 MG/DL — SIGNIFICANT CHANGE UP (ref 70–99)
HCT VFR BLD CALC: 42.1 % — SIGNIFICANT CHANGE UP (ref 37–47)
HCT VFR BLD CALC: 42.1 % — SIGNIFICANT CHANGE UP (ref 37–47)
HGB BLD-MCNC: 12.9 G/DL — SIGNIFICANT CHANGE UP (ref 12–16)
HGB BLD-MCNC: 12.9 G/DL — SIGNIFICANT CHANGE UP (ref 12–16)
IMM GRANULOCYTES NFR BLD AUTO: 0.3 % — SIGNIFICANT CHANGE UP (ref 0.1–0.3)
IMM GRANULOCYTES NFR BLD AUTO: 0.3 % — SIGNIFICANT CHANGE UP (ref 0.1–0.3)
LYMPHOCYTES # BLD AUTO: 2.26 K/UL — SIGNIFICANT CHANGE UP (ref 1.2–3.4)
LYMPHOCYTES # BLD AUTO: 2.26 K/UL — SIGNIFICANT CHANGE UP (ref 1.2–3.4)
LYMPHOCYTES # BLD AUTO: 36.6 % — SIGNIFICANT CHANGE UP (ref 20.5–51.1)
LYMPHOCYTES # BLD AUTO: 36.6 % — SIGNIFICANT CHANGE UP (ref 20.5–51.1)
MAGNESIUM SERPL-MCNC: 2.1 MG/DL — SIGNIFICANT CHANGE UP (ref 1.8–2.4)
MAGNESIUM SERPL-MCNC: 2.1 MG/DL — SIGNIFICANT CHANGE UP (ref 1.8–2.4)
MCHC RBC-ENTMCNC: 28.5 PG — SIGNIFICANT CHANGE UP (ref 27–31)
MCHC RBC-ENTMCNC: 28.5 PG — SIGNIFICANT CHANGE UP (ref 27–31)
MCHC RBC-ENTMCNC: 30.6 G/DL — LOW (ref 32–37)
MCHC RBC-ENTMCNC: 30.6 G/DL — LOW (ref 32–37)
MCV RBC AUTO: 93.1 FL — SIGNIFICANT CHANGE UP (ref 81–99)
MCV RBC AUTO: 93.1 FL — SIGNIFICANT CHANGE UP (ref 81–99)
MONOCYTES # BLD AUTO: 0.46 K/UL — SIGNIFICANT CHANGE UP (ref 0.1–0.6)
MONOCYTES # BLD AUTO: 0.46 K/UL — SIGNIFICANT CHANGE UP (ref 0.1–0.6)
MONOCYTES NFR BLD AUTO: 7.4 % — SIGNIFICANT CHANGE UP (ref 1.7–9.3)
MONOCYTES NFR BLD AUTO: 7.4 % — SIGNIFICANT CHANGE UP (ref 1.7–9.3)
NEUTROPHILS # BLD AUTO: 3.27 K/UL — SIGNIFICANT CHANGE UP (ref 1.4–6.5)
NEUTROPHILS # BLD AUTO: 3.27 K/UL — SIGNIFICANT CHANGE UP (ref 1.4–6.5)
NEUTROPHILS NFR BLD AUTO: 53 % — SIGNIFICANT CHANGE UP (ref 42.2–75.2)
NEUTROPHILS NFR BLD AUTO: 53 % — SIGNIFICANT CHANGE UP (ref 42.2–75.2)
NRBC # BLD: 0 /100 WBCS — SIGNIFICANT CHANGE UP (ref 0–0)
NRBC # BLD: 0 /100 WBCS — SIGNIFICANT CHANGE UP (ref 0–0)
PLATELET # BLD AUTO: 242 K/UL — SIGNIFICANT CHANGE UP (ref 130–400)
PLATELET # BLD AUTO: 242 K/UL — SIGNIFICANT CHANGE UP (ref 130–400)
PMV BLD: 10 FL — SIGNIFICANT CHANGE UP (ref 7.4–10.4)
PMV BLD: 10 FL — SIGNIFICANT CHANGE UP (ref 7.4–10.4)
POTASSIUM SERPL-MCNC: 4.6 MMOL/L — SIGNIFICANT CHANGE UP (ref 3.5–5)
POTASSIUM SERPL-MCNC: 4.6 MMOL/L — SIGNIFICANT CHANGE UP (ref 3.5–5)
POTASSIUM SERPL-SCNC: 4.6 MMOL/L — SIGNIFICANT CHANGE UP (ref 3.5–5)
POTASSIUM SERPL-SCNC: 4.6 MMOL/L — SIGNIFICANT CHANGE UP (ref 3.5–5)
PROT SERPL-MCNC: 6.3 G/DL — SIGNIFICANT CHANGE UP (ref 6–8)
PROT SERPL-MCNC: 6.3 G/DL — SIGNIFICANT CHANGE UP (ref 6–8)
RBC # BLD: 4.52 M/UL — SIGNIFICANT CHANGE UP (ref 4.2–5.4)
RBC # BLD: 4.52 M/UL — SIGNIFICANT CHANGE UP (ref 4.2–5.4)
RBC # FLD: 14 % — SIGNIFICANT CHANGE UP (ref 11.5–14.5)
RBC # FLD: 14 % — SIGNIFICANT CHANGE UP (ref 11.5–14.5)
SODIUM SERPL-SCNC: 142 MMOL/L — SIGNIFICANT CHANGE UP (ref 135–146)
SODIUM SERPL-SCNC: 142 MMOL/L — SIGNIFICANT CHANGE UP (ref 135–146)
WBC # BLD: 6.18 K/UL — SIGNIFICANT CHANGE UP (ref 4.8–10.8)
WBC # BLD: 6.18 K/UL — SIGNIFICANT CHANGE UP (ref 4.8–10.8)
WBC # FLD AUTO: 6.18 K/UL — SIGNIFICANT CHANGE UP (ref 4.8–10.8)
WBC # FLD AUTO: 6.18 K/UL — SIGNIFICANT CHANGE UP (ref 4.8–10.8)

## 2023-10-24 PROCEDURE — 99233 SBSQ HOSP IP/OBS HIGH 50: CPT

## 2023-10-24 RX ORDER — SACUBITRIL AND VALSARTAN 24; 26 MG/1; MG/1
1 TABLET, FILM COATED ORAL
Refills: 0 | Status: DISCONTINUED | OUTPATIENT
Start: 2023-10-25 | End: 2023-10-26

## 2023-10-24 RX ADMIN — ATORVASTATIN CALCIUM 40 MILLIGRAM(S): 80 TABLET, FILM COATED ORAL at 21:31

## 2023-10-24 RX ADMIN — APIXABAN 5 MILLIGRAM(S): 2.5 TABLET, FILM COATED ORAL at 05:32

## 2023-10-24 RX ADMIN — Medication 5 MILLIGRAM(S): at 00:48

## 2023-10-24 RX ADMIN — Medication 12.5 MILLIGRAM(S): at 05:32

## 2023-10-24 RX ADMIN — Medication 40 MILLIGRAM(S): at 05:32

## 2023-10-24 RX ADMIN — LISINOPRIL 10 MILLIGRAM(S): 2.5 TABLET ORAL at 05:32

## 2023-10-24 NOTE — DISCHARGE NOTE PROVIDER - NSDCFUSCHEDAPPT_GEN_ALL_CORE_FT
[facility]  [facility] [address]  Scheduled Appointment: 12/01/2023

## 2023-10-24 NOTE — DISCHARGE NOTE PROVIDER - CARE PROVIDER_API CALL
[facility]  [address] - [address]  [place] [address]  Phone: [phone]  Fax: [phone]  Follow Up Time: 1 week    Tyrone Taylor  Cardiology  [address] [place] [address], [address]  [place] [address]  Phone: [phone]  Fax: [phone]  Follow Up Time:

## 2023-10-24 NOTE — PROGRESS NOTE ADULT - ASSESSMENT
82 yo F PMHx HTN, HLD, chronic HFpEF presented to ED for evaluation of syncope. Pt was sitting at table when she became increasingly less responsive until she completely lost consciousness. Her granddaughter is an EMT and performed a sternal rub. Pt grimaced to sternal rub but did not regain consciousness. When family called EMS the dispatcher instructed them to lay her on the ground flat. When she was supine she almost immediately gained consciousness.     To note pt was discharged on 10/18 after simone was treated for volume overload due to decompensated chronic HFpEF due to missing lasix dose. During that time she developed LOLA but SCr returned to baseline without fluids or holding diuretics. Pt and family requested discharge on 10/18 as pt was developing anxiety from hospitalization and states she felt fine. ECHO not done as she requested it as outpt. Prior to last hospitalization she had a mechanical fall with resultant nasal bone fracture. Suspect pt may have syncopized at that point as well??    Syncope  - possible cardiogenic? ECHO shows systolic failure  - family states she really only ate and drank one time since being home  - orthostatics negative  - c/w telemetry  - bp low normal, hold lisinopril    Acute systolic failure  - pt compensated  - EF now 35-40%. Outpt echo was 50-55%  - cardiology eval  - possible Takusobo as pt's daughter recently passed vs ischemic - as per cardiology less likely as no apical ballooning, however LV gram wasn't done  - metoprolol started,  - if bp remains stable will trial Entresto  - c/w lasix  - EP planning for possible AICD      CKD III  - stable at bedside    Mild hyperkalemia  - resolve       HTN/HLD   - hold lisinopril given  low normal BP    DVT ppx: Lovenox SC  GI ppx: not indicated.   Diet: DASH diet with fluid restriction  Activity: as tolerated.     #Progress Note Handoff:  Pending (specify): Possible AICD  Family discussion:  Disposition: Home_x__/SNF___/Other________/Unknown at this time________

## 2023-10-24 NOTE — PROGRESS NOTE ADULT - SUBJECTIVE AND OBJECTIVE BOX
CHIEF COMPLAINT:    Patient is a 83y old  Female who presents with a chief complaint of Syncope    INTERVAL HPI/OVERNIGHT EVENTS:    Patient seen and examined at bedside. No acute overnight events occurred.    ROS: Denies SOB, chest pain. All other systems are negative.    Medications:  Standing  apixaban 5 milliGRAM(s) Oral every 12 hours  atorvastatin 40 milliGRAM(s) Oral at bedtime  furosemide    Tablet 40 milliGRAM(s) Oral daily  metoprolol succinate ER 12.5 milliGRAM(s) Oral daily  sodium chloride 0.9%. 1000 milliLiter(s) IV Continuous <Continuous>    PRN Meds  diazepam    Tablet 5 milliGRAM(s) Oral at bedtime PRN  melatonin 3 milliGRAM(s) Oral at bedtime PRN        Vital Signs:    T(F): 97 (10-24-23 @ 12:30), Max: 97 (10-23-23 @ 17:46)  HR: 78 (10-24-23 @ 12:30) (58 - 80)  BP: 116/45 (10-24-23 @ 12:30) (75/42 - 120/66)  RR: 18 (10-24-23 @ 12:30) (13 - 25)  SpO2: 99% (10-24-23 @ 08:58) (93% - 99%)  I&O's Summary    23 Oct 2023 07:01  -  24 Oct 2023 07:00  --------------------------------------------------------  IN: 590 mL / OUT: 0 mL / NET: 590 mL      Daily     Daily   CAPILLARY BLOOD GLUCOSE          PHYSICAL EXAM:  GENERAL:  NAD  SKIN: No rashes or lesions  HEENT: Atraumatic. Normocephalic. Anicteric  NECK:  No JVD.   PULMONARY: Clear to ausculation bilaterally. No wheezing. No rales  CVS: Normal S1, S2. Regular rate and rhythm. No murmurs.  ABDOMEN/GI: Soft, Nontender, Nondistended; Bowel sounds are present  EXTREMITIES:  No edema B/L LE.  NEUROLOGIC:  No motor deficit.  PSYCH: Alert & oriented x 3, normal affect      LABS:                        12.9   6.18  )-----------( 242      ( 24 Oct 2023 07:08 )             42.1     10-24    142  |  104  |  33<H>  ----------------------------<  89  4.6   |  27  |  1.1    Ca    8.7      24 Oct 2023 07:08  Mg     2.1     10-24    TPro  6.3  /  Alb  4.0  /  TBili  0.3  /  DBili  x   /  AST  19  /  ALT  27  /  AlkPhos  79  10-24      RADIOLOGY & ADDITIONAL TESTS:  Imaging or report Personally Reviewed:  [ ] YES  [ ] NO -->no new images    Telemetry reviewed independently - NSR, no acute events  EKG reviewed independently -->no new EKGs    Consultant(s) Notes Reviewed:  [ ] YES  [ ] NO  Care Discussed with Consultants/Other Providers [ ] YES  [ ] NO    Case discussed with resident  Care discussed with pt

## 2023-10-24 NOTE — PROGRESS NOTE ADULT - SUBJECTIVE AND OBJECTIVE BOX
Brief Interval History  Patient is a 83y old Female who presents with a chief complaint of Syncope (24 Oct 2023 12:13)    URIEL MCDANIEL is admitted with a primary diagnosis of Syncope      Today is hospital day 4d. Patient is for EP eval today    Admission HPI  HPI:  83-year-old female with PMH a-fib, CHF, HTN, HLD presenting for witnessed syncopal episode that occurred while sitting at the kitchen table with family. Granddaughter at bedside states that patient was well and then suddenly lost consciousness and dropped her head forward but did not her head. Family moved patient from the table to the couch at which point she woke up, granddaughter estimates she was unconscious for about 2 minutes. Patient states that she was feeling fine prior to episode and after waking up felt a little bit dizzy but that resolved within 1/2-hour. Patient was admitted to the hospital on 10/15/2023 for CHF exacerbation; Family noted that vitals taken by paramedics showed HR in the 40s and low bp. Denies SOB, chest pain, weakness, lightheadedness, dizziness, vision change, extremity weakness, black or red stool, abdominal pain, nausea/vomiting, fever, recent illness.    In the ED bp was on the soft side 90/5, other vitals stable  Labs showed mild transaminitis, BNP 2700, trop negative.  UA mildly +, asymptomatic   cbc and bmp unremarkable   chest xray shows mild congestion, to my read.      (20 Oct 2023 20:22)      Past Medical and Surgical History    Social History  Social History:      Allergies  No Known Allergies    Medications  Standing Medications  apixaban 5 milliGRAM(s) Oral every 12 hours  atorvastatin 40 milliGRAM(s) Oral at bedtime  furosemide    Tablet 40 milliGRAM(s) Oral daily  metoprolol succinate ER 12.5 milliGRAM(s) Oral daily  sodium chloride 0.9%. 1000 milliLiter(s) IV Continuous <Continuous>    PRN Medications  diazepam    Tablet 5 milliGRAM(s) Oral at bedtime PRN  melatonin 3 milliGRAM(s) Oral at bedtime PRN    Vitals  T(F): 97  HR: 78  BP: 116/45  RR: 18  SpO2: 99%    I&O's    10-23-23 @ 07:01  -  10-24-23 @ 07:00  --------------------------------------------------------  IN: 590 mL / OUT: 0 mL / NET: 590 mL      Physical Examination  General: NAD, lying in bed comfortably  Head: NCAT  Neck: Supple, no JVD  Cardiac: Regular rate and rhythm. No murmurs, gallops, or rubs  Pulmonary: CTAB  Abdominal: Soft, nontender, and nondistended with positive bowel sounds  Extremities: No pitting edema  Neurologic: AOx3, nonfocal  Skin: No rashes or lesions    Labs                        12.9   6.18  )-----------( 242      ( 24 Oct 2023 07:08 )             42.1     10-24    142  |  104  |  33<H>  ----------------------------<  89  4.6   |  27  |  1.1    Ca    8.7      24 Oct 2023 07:08  Mg     2.1     10-24    TPro  6.3  /  Alb  4.0  /  TBili  0.3  /  DBili  x   /  AST  19  /  ALT  27  /  AlkPhos  79  10-24      Urinalysis Basic - ( 24 Oct 2023 07:08 )    Color: x / Appearance: x / SG: x / pH: x  Gluc: 89 mg/dL / Ketone: x  / Bili: x / Urobili: x   Blood: x / Protein: x / Nitrite: x   Leuk Esterase: x / RBC: x / WBC x   Sq Epi: x / Non Sq Epi: x / Bacteria: x                  Radiology  CXR      CT

## 2023-10-24 NOTE — PROGRESS NOTE ADULT - ASSESSMENT
Patient is 82 y/o Female with HTN, HLD, witnessed syncopal episode while sitting at dinner table at home without any prodromal symptoms. TTE was remarkable for depressed LV EF 35-40%. Patient had LHC that revealed normal coronaries.      Syncope highly concerning for cardiac etiology  NICM with depressed EF 35-40%    CMRI was recommended for the patient to investigate cardiomyopathy further, Mrs. Voss states that she is claustrophobic and will not be able to tolerate MRI study even with the sedation.  ICD is recommended for primary prevention of sudden cardiac death given depressed EF and highly concerning syncope episode for cardiac etiology. Patient would like to discuss it with her family first. Patient had breakfast today at 8am. If patient is agreeable, we will plan for ICD implant today at 4PM or tomorrow. Please keep NPO for now.      Patient is 84 y/o Female with HTN, HLD, witnessed syncopal episode while sitting at dinner table at home without any prodromal symptoms. TTE was remarkable for depressed LV EF 35-40%. Patient had LHC that revealed normal coronaries.      Syncope highly concerning for cardiac etiology  NICM with depressed EF 35-40%    CMRI was recommended for the patient to investigate cardiomyopathy further, Mrs. Voss states that she is claustrophobic and will not be able to tolerate MRI study even with the sedation.  ICD is recommended for primary prevention of sudden cardiac death given depressed EF and highly concerning syncope episode for cardiac etiology. Patient would like to discuss it with her family first. Plan for ICD implant tentatively tomorrow. NPO after midnight

## 2023-10-24 NOTE — PROGRESS NOTE ADULT - ASSESSMENT
83-year-old female with PMH a-fib, CHF, HTN, HLD presenting for witnessed syncopal episode that occurred while sitting at the kitchen table with family. Granddaughter at bedside states that patient was well and then suddenly lost consciousness and dropped her head forward but did not her head. Family moved patient from the table to the couch at which point she woke up, granddaughter estimates she was unconscious for about 2 minutes. Patient states that she was feeling fine prior to episode and after waking up felt a little bit dizzy but that resolved within 1/2-hour. Patient was admitted to the hospital on 10/15/2023 for CHF exacerbation; Family noted that vitals taken by paramedics showed HR in the 40s and low bp. Denies SOB, chest pain, weakness, lightheadedness, dizziness, vision change, extremity weakness, black or red stool, abdominal pain, nausea/vomiting, fever, recent illness.    #Syncope  - No preceding symptoms or prodrome  - ?Possible dehydration from diuretics  - Per family, patient eating/drinking minimally after last discharge 10/18  - Orthostatics negative  - C/w telemetry  - Change lisinopril to entresto (5PM 10.25)    #Acute systolic failure  - EF now 35-40%, outpatient echo showed EF 50-55%  - ?Takotsubo (Patient's daughter recently passed away of pancreatic cancer) vs Ischemic  - C/w metoprolol 12.5mg PO QD  - C/w lasix 40mg PO QD  - Trial Entresto is BP permits  - Start Eliquis 5mg BID per Cardio  - Catheterization 10/23 non ischemic cardiomyopathy  - EP to eval today, ICD tentatively planned for tomorrow per note, will keep NPO at midnight    #CKD Stage III  #HTN  #HLD   - Lisinopril to be changed to Entresto 5PM 10/25  - C/w atorvastatin 40mg PO at bedtime    #Misc  DVT ppx: Eliquis  GI ppx: None  Diet: DASH diet with fluid restriction  Activity: CINDI

## 2023-10-24 NOTE — DISCHARGE NOTE PROVIDER - NSDCMRMEDTOKEN_GEN_ALL_CORE_FT
atorvastatin 40 mg oral tablet: 1 tab(s) orally once a day (at bedtime)  carvedilol 6.25 mg oral tablet: 1 tab(s) orally every 12 hours  diazePAM 5 mg oral tablet: 1 tab(s) orally once a day (at bedtime) as needed for  insomnia  Lasix 40 mg oral tablet: 1 tab(s) orally once a day  lisinopril 10 mg oral tablet: 1 tab(s) orally once a day   atorvastatin 40 mg oral tablet: 1 tab(s) orally once a day (at bedtime)  diazePAM 5 mg oral tablet: 1 tab(s) orally once a day (at bedtime) as needed for  insomnia  Lasix 40 mg oral tablet: 1 tab(s) orally once a day   atorvastatin 40 mg oral tablet: 1 tab(s) orally once a day (at bedtime)  diazePAM 5 mg oral tablet: 1 tab(s) orally once a day (at bedtime) as needed for  insomnia  Lasix 40 mg oral tablet: 1 tab(s) orally once a day  metoprolol succinate 50 mg oral tablet, extended release: 1 tab(s) orally once a day  sacubitril-valsartan 49 mg-51 mg oral tablet: 1 tab(s) orally 2 times a day

## 2023-10-24 NOTE — PROGRESS NOTE ADULT - SUBJECTIVE AND OBJECTIVE BOX
Patient is a 83y old  Female who presents with a chief complaint of Syncope (23 Oct 2023 11:57)      HPI:  83-year-old female with PMH a-fib, CHF, HTN, HLD presenting for witnessed syncopal episode that occurred while sitting at the kitchen table with family. Granddaughter at bedside states that patient was well and then suddenly lost consciousness and dropped her head forward but did not her head. Family moved patient from the table to the couch at which point she woke up, granddaughter estimates she was unconscious for about 2 minutes. Patient states that she was feeling fine prior to episode and after waking up felt a little bit dizzy but that resolved within 1/2-hour. Patient was admitted to the hospital on 10/15/2023 for CHF exacerbation; Family noted that vitals taken by paramedics showed HR in the 40s and low bp. Denies SOB, chest pain, weakness, lightheadedness, dizziness, vision change, extremity weakness, black or red stool, abdominal pain, nausea/vomiting, fever, recent illness.    In the ED bp was on the soft side 90/5, other vitals stable  Labs showed mild transaminitis, BNP 2700, trop negative.  UA mildly +, asymptomatic   cbc and bmp unremarkable   chest xray shows mild congestion, to my read.      (20 Oct 2023 20:22)    Electrophysiology:  83y Female with HTN, HLD, recent admission for dyspnea, Echo was not done at that time, patient was diuresed and discharged per pt and family insistence. On the day after discharge had witnessed syncopal episode while sitting at dinner table at home.    Per chart h/o ?AFib, never been on AC besides ASA, was having some kind of procedure to be done in [place] but cancelled. At that time patient was having "a lot of anxiety". No AF on tele  Pt had Echo that revealed EF 35-40%, no prior studies available  Patient is following with cardiologist in [place], had echo and stress test prior  On 10/22 2023    REVIEW OF SYSTEMS    [ ] A ten-point review of systems was otherwise negative except as noted.  [ ] Due to altered mental status/intubation, subjective information were not able to be obtained from the patient. History was obtained, to the extent possible, from review of the chart and collateral sources of information.      PAST MEDICAL & SURGICAL HISTORY:      Home Medications:  atorvastatin 40 mg oral tablet: 1 tab(s) orally once a day (at bedtime) (20 Oct 2023 20:32)  carvedilol 6.25 mg oral tablet: 1 tab(s) orally every 12 hours (20 Oct 2023 20:32)  diazePAM 5 mg oral tablet: 1 tab(s) orally once a day (at bedtime) as needed for  insomnia (20 Oct 2023 20:32)  Lasix 40 mg oral tablet: 1 tab(s) orally once a day (20 Oct 2023 20:32)  lisinopril 10 mg oral tablet: 1 tab(s) orally once a day (20 Oct 2023 20:32)      Allergies:  No Known Allergies      FAMILY HISTORY:      SOCIAL HISTORY:    CIGARETTES:  ALCOHOL:  MARIJUANA:  ILLICIT DRUGS:        PREVIOUS DIAGNOSTIC TESTING:      ECHO  FINDINGS:    STRESS  FINDINGS:    CATHETERIZATION  FINDINGS:    ELECTROPHYSIOLOGY STUDY  FINDINGS:    CAROTID ULTRASOUND:  FINDINGS    VENOUS DUPLEX SCAN:  FINDINGS:    CHEST CT PULMONARY ANGIO with IV Contrast:  FINDINGS:      MEDICATIONS  (STANDING):  apixaban 5 milliGRAM(s) Oral every 12 hours  atorvastatin 40 milliGRAM(s) Oral at bedtime  furosemide    Tablet 40 milliGRAM(s) Oral daily  metoprolol succinate ER 12.5 milliGRAM(s) Oral daily  sodium chloride 0.9%. 1000 milliLiter(s) (100 mL/Hr) IV Continuous <Continuous>    MEDICATIONS  (PRN):  diazepam    Tablet 5 milliGRAM(s) Oral at bedtime PRN for insomnia  melatonin 3 milliGRAM(s) Oral at bedtime PRN Insomnia      Vital Signs Last 24 Hrs  T(C): 36.1 (23 Oct 2023 20:25), Max: 37.2 (23 Oct 2023 12:26)  T(F): 97 (23 Oct 2023 20:25), Max: 99 (23 Oct 2023 12:26)  HR: 58 (24 Oct 2023 08:58) (52 - 80)  BP: 97/53 (24 Oct 2023 08:58) (75/42 - 122/73)  BP(mean): 54 (23 Oct 2023 18:35) (54 - 54)  RR: 18 (24 Oct 2023 08:58) (13 - 25)  SpO2: 99% (24 Oct 2023 08:58) (93% - 99%)    Parameters below as of 24 Oct 2023 08:58  Patient On (Oxygen Delivery Method): room air        PHYSICAL EXAM:    GENERAL: In no apparent distress, well nourished, and hydrated.  HEAD:  Atraumatic, Normocephalic  EYES: EOMI, PERRLA, conjunctiva and sclera clear  NECK: Supple and normal thyroid.  No JVD or carotid bruit.  Carotid pulse is 2+ bilaterally.  HEART: Regular rate and rhythm; No murmurs, rubs, or gallops.  PULMONARY: Clear to auscultation and perfusion.  No rales, wheezing, or rhonchi bilaterally.  ABDOMEN: Soft, Nontender, Nondistended; Bowel sounds present  EXTREMITIES:  2+ Peripheral Pulses, No clubbing, cyanosis, or edema  NEUROLOGICAL: Grossly nonfocal    I&O's Detail    23 Oct 2023 07:01  -  24 Oct 2023 07:00  --------------------------------------------------------  IN:    Oral Fluid: 590 mL  Total IN: 590 mL    OUT:  Total OUT: 0 mL    Total NET: 590 mL        Daily     Daily     INTERPRETATION OF TELEMETRY:    ECG:        LABS:                        12.9   6.18  )-----------( 242      ( 24 Oct 2023 07:08 )             42.1     10-24    142  |  104  |  33<H>  ----------------------------<  89  4.6   |  27  |  1.1    Ca    8.7      24 Oct 2023 07:08  Mg     2.1     10-24    TPro  6.3  /  Alb  4.0  /  TBili  0.3  /  DBili  x   /  AST  19  /  ALT  27  /  AlkPhos  79  10-24          Urinalysis Basic - ( 24 Oct 2023 07:08 )    Color: x / Appearance: x / SG: x / pH: x  Gluc: 89 mg/dL / Ketone: x  / Bili: x / Urobili: x   Blood: x / Protein: x / Nitrite: x   Leuk Esterase: x / RBC: x / WBC x   Sq Epi: x / Non Sq Epi: x / Bacteria: x      BNP            RADIOLOGY & ADDITIONAL STUDIES:       Patient is a 83y old  Female who presents with a chief complaint of Syncope (23 Oct 2023 11:57)      HPI:  83-year-old female with PMH a-fib, CHF, HTN, HLD presenting for witnessed syncopal episode that occurred while sitting at the kitchen table with family. Granddaughter at bedside states that patient was well and then suddenly lost consciousness and dropped her head forward but did not her head. Family moved patient from the table to the couch at which point she woke up, granddaughter estimates she was unconscious for about 2 minutes. Patient states that she was feeling fine prior to episode and after waking up felt a little bit dizzy but that resolved within 1/2-hour. Patient was admitted to the hospital on 10/15/2023 for CHF exacerbation; Family noted that vitals taken by paramedics showed HR in the 40s and low bp. Denies SOB, chest pain, weakness, lightheadedness, dizziness, vision change, extremity weakness, black or red stool, abdominal pain, nausea/vomiting, fever, recent illness.    In the ED bp was on the soft side 90/5, other vitals stable  Labs showed mild transaminitis, BNP 2700, trop negative.  UA mildly +, asymptomatic   cbc and bmp unremarkable   chest xray shows mild congestion, to my read.      (20 Oct 2023 20:22)    Electrophysiology:  83y Female with HTN, HLD, recent admission for dyspnea, Echo was not done at that time, patient was diuresed and discharged per pt and family insistence. On the day after discharge had witnessed syncopal episode while sitting at dinner table at home.    Per chart h/o ?AFib, never been on AC besides ASA, was having some kind of procedure to be done in  but cancelled. At that time patient was having "a lot of anxiety". No AF on tele  Pt had Echo that revealed EF 35-40%, no prior studies available  Patient is following with cardiologist in [place], had echo and stress test prior  [facility] done on 10/23/2023, nonischemic cardiomyopathy.    REVIEW OF SYSTEMS    [x] A ten-point review of systems was otherwise negative except as noted.  [ ] Due to altered mental status/intubation, subjective information were not able to be obtained from the patient. History was obtained, to the extent possible, from review of the chart and collateral sources of information.    PAST MEDICAL & SURGICAL HISTORY:    Home Medications:  atorvastatin 40 mg oral tablet: 1 tab(s) orally once a day (at bedtime) (20 Oct 2023 20:32)  carvedilol 6.25 mg oral tablet: 1 tab(s) orally every 12 hours (20 Oct 2023 20:32)  diazePAM 5 mg oral tablet: 1 tab(s) orally once a day (at bedtime) as needed for  insomnia (20 Oct 2023 20:32)  Lasix 40 mg oral tablet: 1 tab(s) orally once a day (20 Oct 2023 20:32)  lisinopril 10 mg oral tablet: 1 tab(s) orally once a day (20 Oct 2023 20:32)    Allergies:  No Known Allergies  FAMILY HISTORY:    SOCIAL HISTORY:    CIGARETTES:  ALCOHOL:  MARIJUANA:  ILLICIT DRUGS:      PREVIOUS DIAGNOSTIC TESTING:      < from: TTE Echo Complete w/o Contrast w/ Doppler (10.22.23 @ 10:30) >  Summary:   1. Moderately decreased global left ventricular systolic function. Left   ventricular ejection fraction, by visual estimation, is 35 to 40%. Mild   (Grade I) diastolic dysfunction.   2. Normal right ventricular size and function.   3. Mildly enlarged left atrium.   4. Mild mitral valve regurgitation.   5. Sclerotic aortic valve with normal opening.   6. No echocardiographic evidence of pulmonary hypertension (PASP =   33mmHg).   7. There is no evidence of pericardial effusion.   8. Frequent premature ventricular complexes noted during this study.      STRESS  FINDINGS:    CATHETERIZATION  FINDINGS:    ELECTROPHYSIOLOGY STUDY  FINDINGS:    CAROTID ULTRASOUND:  FINDINGS    VENOUS DUPLEX SCAN:  FINDINGS:  < from: VA Duplex Lower Ext Vein Scan, Bilat (10.15.23 @ 14:19) >  Impression:  No evidence of deep venous thrombosis or superficial thrombophlebitis in   the bilateral lower extremities.      CHEST CT PULMONARY ANGIO with IV Contrast:  FINDINGS:  < from: CT Angio Chest PE Protocol w/ IV Cont (10.15.23 @ 17:10) >  IMPRESSION:    No CTA evidence of acute pulmonary embolus.  Reflux of contrast into the inferior vena cava and hepatic veins. Small   bilateral pleural effusions and mild mosaic lung attenuation. Findings   are compatible with right heart failure    MEDICATIONS  (STANDING):  apixaban 5 milliGRAM(s) Oral every 12 hours  atorvastatin 40 milliGRAM(s) Oral at bedtime  furosemide    Tablet 40 milliGRAM(s) Oral daily  metoprolol succinate ER 12.5 milliGRAM(s) Oral daily  sodium chloride 0.9%. 1000 milliLiter(s) (100 mL/Hr) IV Continuous <Continuous>    MEDICATIONS  (PRN):  diazepam    Tablet 5 milliGRAM(s) Oral at bedtime PRN for insomnia  melatonin 3 milliGRAM(s) Oral at bedtime PRN Insomnia    Vital Signs Last 24 Hrs  T(C): 36.1 (23 Oct 2023 20:25), Max: 37.2 (23 Oct 2023 12:26)  T(F): 97 (23 Oct 2023 20:25), Max: 99 (23 Oct 2023 12:26)  HR: 58 (24 Oct 2023 08:58) (52 - 80)  BP: 97/53 (24 Oct 2023 08:58) (75/42 - 122/73)  BP(mean): 54 (23 Oct 2023 18:35) (54 - 54)  RR: 18 (24 Oct 2023 08:58) (13 - 25)  SpO2: 99% (24 Oct 2023 08:58) (93% - 99%)    Parameters below as of 24 Oct 2023 08:58  Patient On (Oxygen Delivery Method): room air    PHYSICAL EXAM:    GENERAL: In no apparent distress, well nourished, and hydrated.  HEAD:  Atraumatic, Normocephalic  EYES: EOMI, PERRLA, conjunctiva and sclera clear  NECK: Supple and normal thyroid.  No JVD or carotid bruit.  Carotid pulse is 2+ bilaterally.  HEART: Regular rate and rhythm; No murmurs, rubs, or gallops.  PULMONARY: Clear to auscultation and perfusion.  No rales, wheezing, or rhonchi bilaterally.  ABDOMEN: Soft, Nontender, Nondistended; Bowel sounds present  EXTREMITIES:  2+ Peripheral Pulses, No clubbing, cyanosis, or edema  NEUROLOGICAL: Grossly nonfocal    I&O's Detail    23 Oct 2023 07:01  -  24 Oct 2023 07:00  --------------------------------------------------------  IN:    Oral Fluid: 590 mL  Total IN: 590 mL    OUT:  Total OUT: 0 mL    Total NET: 590 mL      INTERPRETATION OF TELEMETRY:    ECG:  Ventricular Rate 82 BPM  Atrial Rate 82 BPM  P-R Interval 184 ms  QRS Duration 104 ms  Q-T Interval 426 ms  QTC Calculation(Bazett) 497 ms  P Axis 91 degrees  R Axis 72 degrees  T Axis 212 degrees  Diagnosis Line Sinus rhythm with frequent and consecutive Premature ventricular complexes and   Premature atrial complexes  Nonspecific T wave abnormality  Prolonged QT  Abnormal ECG        LABS:                        12.9   6.18  )-----------( 242      ( 24 Oct 2023 07:08 )             42.1     10-24    142  |  104  |  33<H>  ----------------------------<  89  4.6   |  27  |  1.1    Ca    8.7      24 Oct 2023 07:08  Mg     2.1     10-24    TPro  6.3  /  Alb  4.0  /  TBili  0.3  /  DBili  x   /  AST  19  /  ALT  27  /  AlkPhos  79  10-24      Urinalysis Basic - ( 24 Oct 2023 07:08 )  Color: x / Appearance: x / SG: x / pH: x  Gluc: 89 mg/dL / Ketone: x  / Bili: x / Urobili: x   Blood: x / Protein: x / Nitrite: x   Leuk Esterase: x / RBC: x / WBC x   Sq Epi: x / Non Sq Epi: x / Bacteria: x      RADIOLOGY & ADDITIONAL STUDIES:  XR CHEST PORTABLE ROUTINE 1V   ORDERED BY: CORINNE DANIEL     PROCEDURE DATE:  10/21/2023    INTERPRETATION:  Clinical History / Reason for exam: Seizure    Technique/Positioning: Single AP view of the chest.    Findings:    Support devices: None.  Cardiac/mediastinum/hilum: Unchanged  Lung parenchyma/Pleura: Right basilar opacity/atelectasis. No   pneumothorax.  Skeleton/soft tissues: Unchanged    Impression:  Right basilar opacity/atelectasis.

## 2023-10-24 NOTE — DISCHARGE NOTE PROVIDER - NSDCCPCAREPLAN_GEN_ALL_CORE_FT
PRINCIPAL DISCHARGE DIAGNOSIS  Diagnosis: Syncope  Assessment and Plan of Treatment: You were diagnosed with syncope likely due to an underlying cardiac etiology. Your heart is weaker than is normally expected, along with findings on your EKG and on telemetry in the hospital, you were evaluated by the electrophysiology team and underwent a procedure for placement of an ICD. Ensure to follow the instructions below from the electrophysiology team. Additionally, it is necessary to take your medications exactly as they are prescribed to you in your discharge paperwork. Ensure to follow up with the electrophysiology team, your cardiologist, and your primary care physician. If you do not have a cardiologist or a primary care physician, they will be included in your discharge paperwork.  If you experience any episodes of fainting, dizziness, chest pain, shortness of breath, or weakness, return to the hospital right away for further evaluation.  No Heavy lifting >5 lbs. Do not raise your arm above shoulder level for 4-6 weeks.   No driving for 2 weeks  No shower, bathtub, no wetting device site for 5 days.  Can remove the bandage and take a shower on 10/30/2023. Please leave steristrips intact and let them to fall off on their own.        SECONDARY DISCHARGE DIAGNOSES  Diagnosis: Bradycardia  Assessment and Plan of Treatment:

## 2023-10-24 NOTE — DISCHARGE NOTE PROVIDER - HOSPITAL COURSE
83-year-old female with PMH a-fib, CHF, HTN, HLD presenting for witnessed syncopal episode that occurred while sitting at the kitchen table with family. Granddaughter at bedside states that patient was well and then suddenly lost consciousness and dropped her head forward but did not her head. Family moved patient from the table to the couch at which point she woke up, granddaughter estimates she was unconscious for about 2 minutes. Patient states that she was feeling fine prior to episode and after waking up felt a little bit dizzy but that resolved within 1/2-hour. Patient was admitted to the hospital on 10/15/2023 for CHF exacerbation; Family noted that vitals taken by paramedics showed HR in the 40s and low bp. Denies SOB, chest pain, weakness, lightheadedness, dizziness, vision change, extremity weakness, black or red stool, abdominal pain, nausea/vomiting, fever, recent illness.    In the ED bp was on the soft side 90/5, other vitals stable  Labs showed mild transaminitis, BNP 2700, trop negative.  UA mildly +, asymptomatic   cbc and bmp unremarkable   chest xray shows mild congestion, to my read.     To note pt was discharged on 10/18 after simone was treated for volume overload due to decompensated chronic HFpEF due to missing lasix dose. During that time she developed LOLA but SCr returned to baseline without fluids or holding diuretics. Pt and family requested discharge on 10/18 as pt was developing anxiety from hospitalization and states she felt fine. ECHO not done as she requested it as outpt. Prior to last hospitalization she had a mechanical fall with resultant nasal bone fracture.      #Syncope  - Etiology likely to be vasovagal/orthostatic  - ?Possible dehydration from diuretics  - Per family, patient eating/drinking minimally after last discharge 10/18  - Orthostatics negative  - C/w telemetry  - Holding lisinopril as BP is borderline/soft    #Acute systolic failure  - EF now 35-40%, outpatient echo showed EF 50-55%  - C/w metoprolol 12.5mg PO QD  - C/w lasix 40mg PO QD  - Trial Entresto if BP permits  - Start Eliquis 5mg BID per Cardio  - Cardiac cath 10/23 did not show evidence of ischemia, diagnosed with non-ischemic cardiomyopathy  - EP recommending ILR    #CKD Stage III  #HTN  #HLD   - D/c lisinopril, to switch to entresto beginning 10/25 at 5PM  - C/w atorvastatin 40mg PO at bedtime   83-year-old female with PMH a-fib, CHF, HTN, HLD presenting for witnessed syncopal episode that occurred while sitting at the kitchen table with family. Granddaughter at bedside states that patient was well and then suddenly lost consciousness and dropped her head forward but did not her head. Family moved patient from the table to the couch at which point she woke up, granddaughter estimates she was unconscious for about 2 minutes. Patient states that she was feeling fine prior to episode and after waking up felt a little bit dizzy but that resolved within 1/2-hour. Patient was admitted to the hospital on 10/15/2023 for CHF exacerbation; Family noted that vitals taken by paramedics showed HR in the 40s and low bp. Denies SOB, chest pain, weakness, lightheadedness, dizziness, vision change, extremity weakness, black or red stool, abdominal pain, nausea/vomiting, fever, recent illness.    In the ED bp was on the soft side 90/5, other vitals stable  Labs showed mild transaminitis, BNP 2700, trop negative.  UA mildly +, asymptomatic   cbc and bmp unremarkable   chest xray shows mild congestion, to my read.     To note pt was discharged on 10/18 after simone was treated for volume overload due to decompensated chronic HFpEF due to missing lasix dose. During that time she developed LOLA but SCr returned to baseline without fluids or holding diuretics. Pt and family requested discharge on 10/18 as pt was developing anxiety from hospitalization and states she felt fine. ECHO not done as she requested it as outpt. Prior to last hospitalization she had a mechanical fall with resultant nasal bone fracture.      #Syncope  - Etiology likely to be vasovagal/orthostatic  - ?Possible dehydration from diuretics  - Per family, patient eating/drinking minimally after last discharge 10/18  - Orthostatics negative  - C/w telemetry  - Holding lisinopril as BP is borderline/soft    #Acute systolic failure  - EF now 35-40%, outpatient echo showed EF 50-55%  - C/w metoprolol 12.5mg PO QD  - C/w lasix 40mg PO QD  - Trial Entresto if BP permits  - Start Eliquis 5mg BID per Cardio  - Cardiac cath 10/23 did not show evidence of ischemia, diagnosed with non-ischemic cardiomyopathy  - EP recommending ILR    #CKD Stage III  #HTN  #HLD   - D/c lisinopril, to switch to entresto beginning 10/25 at 5PM  - C/w atorvastatin 40mg PO at bedtime    Patient was seen by electrophysiology and recommended ICD placement given depressed EF% and syncopal episode concerning for cardiac etiology    Patient is currently medically stable for discharge with outpatient follow up and medications 83-year-old female with PMH a-fib, CHF, HTN, HLD presenting for witnessed syncopal episode that occurred while sitting at the kitchen table with family. Granddaughter at bedside states that patient was well and then suddenly lost consciousness and dropped her head forward but did not her head. Family moved patient from the table to the couch at which point she woke up, granddaughter estimates she was unconscious for about 2 minutes. Patient states that she was feeling fine prior to episode and after waking up felt a little bit dizzy but that resolved within 1/2-hour. Patient was admitted to the hospital on 10/15/2023 for CHF exacerbation; Family noted that vitals taken by paramedics showed HR in the 40s and low bp. Denies SOB, chest pain, weakness, lightheadedness, dizziness, vision change, extremity weakness, black or red stool, abdominal pain, nausea/vomiting, fever, recent illness.    I83 yo F PMHx HTN, HLD, HFpEF presented to ED for evaluation of syncope. Pt was sitting at table when she became increasingly less responsive until she completely lost consciousness.    A/P:   Syncope:   Non sustained Ventricular Tachycardia:   Neuro exam showed normal CN II-XII, strength 5/5 in all limbs  EKG showed Normal Sinus Rhythm  Echo showed LVEF 35-40%  Telemetry monitor showed episode of NSVT 10/21, also multiple polymorphic PVCs.   Orthostatic changes negative.   As echo showed Low LVEF 35-40% and high suspicion of cardiac arrhythmia EP recommended ICD placement.   s/p AICD Placement  Increase Metoprolol to 50mg daily.     Acute HFrEF:   Nonischemic Cardiomyopathy:   Echo showed LVEF 35-40%.   Cardiac cath 10/23 showed no significant CAD.   Continue Metoprolol, Entresto and Lasix.     CKD III  stable at bedside    HTN/HLD   Hold lisinopril given low normal BP    DVT ppx: Lovenox SC  GI ppx: not indicated.   Diet: DASH diet with fluid restriction    Patient was seen by electrophysiology and recommended ICD placement given depressed EF% and syncopal episode concerning for cardiac etiology  She underwent successful ICD placement on 10/25.  Patient is currently medically stable for discharge with outpatient follow up with electrophysiology in 4 weeks and updated medications.

## 2023-10-25 LAB
ALBUMIN SERPL ELPH-MCNC: 4.3 G/DL — SIGNIFICANT CHANGE UP (ref 3.5–5.2)
ALBUMIN SERPL ELPH-MCNC: 4.3 G/DL — SIGNIFICANT CHANGE UP (ref 3.5–5.2)
ALP SERPL-CCNC: 74 U/L — SIGNIFICANT CHANGE UP (ref 30–115)
ALP SERPL-CCNC: 74 U/L — SIGNIFICANT CHANGE UP (ref 30–115)
ALT FLD-CCNC: 24 U/L — SIGNIFICANT CHANGE UP (ref 0–41)
ALT FLD-CCNC: 24 U/L — SIGNIFICANT CHANGE UP (ref 0–41)
ANION GAP SERPL CALC-SCNC: 9 MMOL/L — SIGNIFICANT CHANGE UP (ref 7–14)
ANION GAP SERPL CALC-SCNC: 9 MMOL/L — SIGNIFICANT CHANGE UP (ref 7–14)
APTT BLD: 33.4 SEC — SIGNIFICANT CHANGE UP (ref 27–39.2)
APTT BLD: 33.4 SEC — SIGNIFICANT CHANGE UP (ref 27–39.2)
AST SERPL-CCNC: 16 U/L — SIGNIFICANT CHANGE UP (ref 0–41)
AST SERPL-CCNC: 16 U/L — SIGNIFICANT CHANGE UP (ref 0–41)
BASOPHILS # BLD AUTO: 0.06 K/UL — SIGNIFICANT CHANGE UP (ref 0–0.2)
BASOPHILS # BLD AUTO: 0.06 K/UL — SIGNIFICANT CHANGE UP (ref 0–0.2)
BASOPHILS NFR BLD AUTO: 0.9 % — SIGNIFICANT CHANGE UP (ref 0–1)
BASOPHILS NFR BLD AUTO: 0.9 % — SIGNIFICANT CHANGE UP (ref 0–1)
BILIRUB SERPL-MCNC: 0.4 MG/DL — SIGNIFICANT CHANGE UP (ref 0.2–1.2)
BILIRUB SERPL-MCNC: 0.4 MG/DL — SIGNIFICANT CHANGE UP (ref 0.2–1.2)
BUN SERPL-MCNC: 33 MG/DL — HIGH (ref 10–20)
BUN SERPL-MCNC: 33 MG/DL — HIGH (ref 10–20)
CALCIUM SERPL-MCNC: 9.2 MG/DL — SIGNIFICANT CHANGE UP (ref 8.4–10.5)
CALCIUM SERPL-MCNC: 9.2 MG/DL — SIGNIFICANT CHANGE UP (ref 8.4–10.5)
CHLORIDE SERPL-SCNC: 103 MMOL/L — SIGNIFICANT CHANGE UP (ref 98–110)
CHLORIDE SERPL-SCNC: 103 MMOL/L — SIGNIFICANT CHANGE UP (ref 98–110)
CO2 SERPL-SCNC: 28 MMOL/L — SIGNIFICANT CHANGE UP (ref 17–32)
CO2 SERPL-SCNC: 28 MMOL/L — SIGNIFICANT CHANGE UP (ref 17–32)
CREAT SERPL-MCNC: 1 MG/DL — SIGNIFICANT CHANGE UP (ref 0.7–1.5)
CREAT SERPL-MCNC: 1 MG/DL — SIGNIFICANT CHANGE UP (ref 0.7–1.5)
EGFR: 55 ML/MIN/1.73M2 — LOW
EGFR: 55 ML/MIN/1.73M2 — LOW
EOSINOPHIL # BLD AUTO: 0.17 K/UL — SIGNIFICANT CHANGE UP (ref 0–0.7)
EOSINOPHIL # BLD AUTO: 0.17 K/UL — SIGNIFICANT CHANGE UP (ref 0–0.7)
EOSINOPHIL NFR BLD AUTO: 2.5 % — SIGNIFICANT CHANGE UP (ref 0–8)
EOSINOPHIL NFR BLD AUTO: 2.5 % — SIGNIFICANT CHANGE UP (ref 0–8)
GLUCOSE SERPL-MCNC: 100 MG/DL — HIGH (ref 70–99)
GLUCOSE SERPL-MCNC: 100 MG/DL — HIGH (ref 70–99)
HCT VFR BLD CALC: 41.9 % — SIGNIFICANT CHANGE UP (ref 37–47)
HCT VFR BLD CALC: 41.9 % — SIGNIFICANT CHANGE UP (ref 37–47)
HGB BLD-MCNC: 13 G/DL — SIGNIFICANT CHANGE UP (ref 12–16)
HGB BLD-MCNC: 13 G/DL — SIGNIFICANT CHANGE UP (ref 12–16)
IMM GRANULOCYTES NFR BLD AUTO: 0.4 % — HIGH (ref 0.1–0.3)
IMM GRANULOCYTES NFR BLD AUTO: 0.4 % — HIGH (ref 0.1–0.3)
INR BLD: 0.97 RATIO — SIGNIFICANT CHANGE UP (ref 0.65–1.3)
INR BLD: 0.97 RATIO — SIGNIFICANT CHANGE UP (ref 0.65–1.3)
LYMPHOCYTES # BLD AUTO: 2.47 K/UL — SIGNIFICANT CHANGE UP (ref 1.2–3.4)
LYMPHOCYTES # BLD AUTO: 2.47 K/UL — SIGNIFICANT CHANGE UP (ref 1.2–3.4)
LYMPHOCYTES # BLD AUTO: 36.9 % — SIGNIFICANT CHANGE UP (ref 20.5–51.1)
LYMPHOCYTES # BLD AUTO: 36.9 % — SIGNIFICANT CHANGE UP (ref 20.5–51.1)
MAGNESIUM SERPL-MCNC: 2.1 MG/DL — SIGNIFICANT CHANGE UP (ref 1.8–2.4)
MAGNESIUM SERPL-MCNC: 2.1 MG/DL — SIGNIFICANT CHANGE UP (ref 1.8–2.4)
MCHC RBC-ENTMCNC: 28.8 PG — SIGNIFICANT CHANGE UP (ref 27–31)
MCHC RBC-ENTMCNC: 28.8 PG — SIGNIFICANT CHANGE UP (ref 27–31)
MCHC RBC-ENTMCNC: 31 G/DL — LOW (ref 32–37)
MCHC RBC-ENTMCNC: 31 G/DL — LOW (ref 32–37)
MCV RBC AUTO: 92.7 FL — SIGNIFICANT CHANGE UP (ref 81–99)
MCV RBC AUTO: 92.7 FL — SIGNIFICANT CHANGE UP (ref 81–99)
MONOCYTES # BLD AUTO: 0.55 K/UL — SIGNIFICANT CHANGE UP (ref 0.1–0.6)
MONOCYTES # BLD AUTO: 0.55 K/UL — SIGNIFICANT CHANGE UP (ref 0.1–0.6)
MONOCYTES NFR BLD AUTO: 8.2 % — SIGNIFICANT CHANGE UP (ref 1.7–9.3)
MONOCYTES NFR BLD AUTO: 8.2 % — SIGNIFICANT CHANGE UP (ref 1.7–9.3)
NEUTROPHILS # BLD AUTO: 3.41 K/UL — SIGNIFICANT CHANGE UP (ref 1.4–6.5)
NEUTROPHILS # BLD AUTO: 3.41 K/UL — SIGNIFICANT CHANGE UP (ref 1.4–6.5)
NEUTROPHILS NFR BLD AUTO: 51.1 % — SIGNIFICANT CHANGE UP (ref 42.2–75.2)
NEUTROPHILS NFR BLD AUTO: 51.1 % — SIGNIFICANT CHANGE UP (ref 42.2–75.2)
NRBC # BLD: 0 /100 WBCS — SIGNIFICANT CHANGE UP (ref 0–0)
NRBC # BLD: 0 /100 WBCS — SIGNIFICANT CHANGE UP (ref 0–0)
PLATELET # BLD AUTO: 255 K/UL — SIGNIFICANT CHANGE UP (ref 130–400)
PLATELET # BLD AUTO: 255 K/UL — SIGNIFICANT CHANGE UP (ref 130–400)
PMV BLD: 9.6 FL — SIGNIFICANT CHANGE UP (ref 7.4–10.4)
PMV BLD: 9.6 FL — SIGNIFICANT CHANGE UP (ref 7.4–10.4)
POTASSIUM SERPL-MCNC: 4.6 MMOL/L — SIGNIFICANT CHANGE UP (ref 3.5–5)
POTASSIUM SERPL-MCNC: 4.6 MMOL/L — SIGNIFICANT CHANGE UP (ref 3.5–5)
POTASSIUM SERPL-SCNC: 4.6 MMOL/L — SIGNIFICANT CHANGE UP (ref 3.5–5)
POTASSIUM SERPL-SCNC: 4.6 MMOL/L — SIGNIFICANT CHANGE UP (ref 3.5–5)
PROT SERPL-MCNC: 6.5 G/DL — SIGNIFICANT CHANGE UP (ref 6–8)
PROT SERPL-MCNC: 6.5 G/DL — SIGNIFICANT CHANGE UP (ref 6–8)
PROTHROM AB SERPL-ACNC: 11 SEC — SIGNIFICANT CHANGE UP (ref 9.95–12.87)
PROTHROM AB SERPL-ACNC: 11 SEC — SIGNIFICANT CHANGE UP (ref 9.95–12.87)
RBC # BLD: 4.52 M/UL — SIGNIFICANT CHANGE UP (ref 4.2–5.4)
RBC # BLD: 4.52 M/UL — SIGNIFICANT CHANGE UP (ref 4.2–5.4)
RBC # FLD: 13.8 % — SIGNIFICANT CHANGE UP (ref 11.5–14.5)
RBC # FLD: 13.8 % — SIGNIFICANT CHANGE UP (ref 11.5–14.5)
SODIUM SERPL-SCNC: 140 MMOL/L — SIGNIFICANT CHANGE UP (ref 135–146)
SODIUM SERPL-SCNC: 140 MMOL/L — SIGNIFICANT CHANGE UP (ref 135–146)
WBC # BLD: 6.69 K/UL — SIGNIFICANT CHANGE UP (ref 4.8–10.8)
WBC # BLD: 6.69 K/UL — SIGNIFICANT CHANGE UP (ref 4.8–10.8)
WBC # FLD AUTO: 6.69 K/UL — SIGNIFICANT CHANGE UP (ref 4.8–10.8)
WBC # FLD AUTO: 6.69 K/UL — SIGNIFICANT CHANGE UP (ref 4.8–10.8)

## 2023-10-25 PROCEDURE — 99233 SBSQ HOSP IP/OBS HIGH 50: CPT | Mod: 57

## 2023-10-25 PROCEDURE — 33249 INSJ/RPLCMT DEFIB W/LEAD(S): CPT

## 2023-10-25 PROCEDURE — 99233 SBSQ HOSP IP/OBS HIGH 50: CPT

## 2023-10-25 RX ORDER — OXYCODONE AND ACETAMINOPHEN 5; 325 MG/1; MG/1
2 TABLET ORAL ONCE
Refills: 0 | Status: DISCONTINUED | OUTPATIENT
Start: 2023-10-25 | End: 2023-10-26

## 2023-10-25 RX ORDER — VANCOMYCIN HCL 1 G
1000 VIAL (EA) INTRAVENOUS ONCE
Refills: 0 | Status: COMPLETED | OUTPATIENT
Start: 2023-10-25 | End: 2023-10-25

## 2023-10-25 RX ORDER — VANCOMYCIN HCL 1 G
1000 VIAL (EA) INTRAVENOUS EVERY 12 HOURS
Refills: 0 | Status: DISCONTINUED | OUTPATIENT
Start: 2023-10-26 | End: 2023-10-26

## 2023-10-25 RX ORDER — ONDANSETRON 8 MG/1
4 TABLET, FILM COATED ORAL ONCE
Refills: 0 | Status: DISCONTINUED | OUTPATIENT
Start: 2023-10-25 | End: 2023-10-26

## 2023-10-25 RX ORDER — VANCOMYCIN HCL 1 G
VIAL (EA) INTRAVENOUS
Refills: 0 | Status: DISCONTINUED | OUTPATIENT
Start: 2023-10-25 | End: 2023-10-26

## 2023-10-25 RX ORDER — SODIUM CHLORIDE 9 MG/ML
1000 INJECTION, SOLUTION INTRAVENOUS
Refills: 0 | Status: DISCONTINUED | OUTPATIENT
Start: 2023-10-25 | End: 2023-10-26

## 2023-10-25 RX ADMIN — Medication 5 MILLIGRAM(S): at 00:08

## 2023-10-25 RX ADMIN — SACUBITRIL AND VALSARTAN 1 TABLET(S): 24; 26 TABLET, FILM COATED ORAL at 18:59

## 2023-10-25 RX ADMIN — ATORVASTATIN CALCIUM 40 MILLIGRAM(S): 80 TABLET, FILM COATED ORAL at 22:33

## 2023-10-25 RX ADMIN — Medication 40 MILLIGRAM(S): at 05:46

## 2023-10-25 RX ADMIN — Medication 12.5 MILLIGRAM(S): at 05:46

## 2023-10-25 RX ADMIN — SODIUM CHLORIDE 75 MILLILITER(S): 9 INJECTION, SOLUTION INTRAVENOUS at 22:35

## 2023-10-25 RX ADMIN — Medication 250 MILLIGRAM(S): at 16:09

## 2023-10-25 RX ADMIN — Medication 250 MILLIGRAM(S): at 17:09

## 2023-10-25 NOTE — PROGRESS NOTE ADULT - SUBJECTIVE AND OBJECTIVE BOX
TRICIARUBENURIEL LORENZO  83y  Female      Patient is a 83y old  Female who presents with a chief complaint of Syncope (25 Oct 2023 15:45)      INTERVAL HPI/OVERNIGHT EVENTS:  She feels ok, no chest pain or SOB.   Vital Signs Last 24 Hrs  T(C): 36.2 (25 Oct 2023 15:07), Max: 36.8 (24 Oct 2023 20:46)  T(F): 97.1 (25 Oct 2023 13:05), Max: 98.3 (24 Oct 2023 20:46)  HR: 68 (25 Oct 2023 15:07) (68 - 86)  BP: 105/58 (25 Oct 2023 15:07) (102/57 - 105/58)  BP(mean): 54 (25 Oct 2023 15:07) (54 - 54)  RR: 18 (25 Oct 2023 15:07) (18 - 18)  SpO2: 99% (25 Oct 2023 15:07) (99% - 99%)    Parameters below as of 25 Oct 2023 15:07    O2 Flow (L/min): 2              Consultant(s) Notes Reviewed:  [x ] YES  [ ] NO          MEDICATIONS  (STANDING):  atorvastatin 40 milliGRAM(s) Oral at bedtime  furosemide    Tablet 40 milliGRAM(s) Oral daily  metoprolol succinate ER 12.5 milliGRAM(s) Oral daily  sacubitril 49 mG/valsartan 51 mG 1 Tablet(s) Oral two times a day  sodium chloride 0.9%. 1000 milliLiter(s) (100 mL/Hr) IV Continuous <Continuous>  vancomycin  IVPB      vancomycin  IVPB 1000 milliGRAM(s) IV Intermittent once    MEDICATIONS  (PRN):  diazepam    Tablet 5 milliGRAM(s) Oral at bedtime PRN for insomnia  melatonin 3 milliGRAM(s) Oral at bedtime PRN Insomnia      LABS                          13.0   6.69  )-----------( 255      ( 25 Oct 2023 07:28 )             41.9     10-25    140  |  103  |  33<H>  ----------------------------<  100<H>  4.6   |  28  |  1.0    Ca    9.2      25 Oct 2023 07:28  Mg     2.1     10-25    TPro  6.5  /  Alb  4.3  /  TBili  0.4  /  DBili  x   /  AST  16  /  ALT  24  /  AlkPhos  74  10-25      Urinalysis Basic - ( 25 Oct 2023 07:28 )    Color: x / Appearance: x / SG: x / pH: x  Gluc: 100 mg/dL / Ketone: x  / Bili: x / Urobili: x   Blood: x / Protein: x / Nitrite: x   Leuk Esterase: x / RBC: x / WBC x   Sq Epi: x / Non Sq Epi: x / Bacteria: x      PT/INR - ( 25 Oct 2023 07:28 )   PT: 11.00 sec;   INR: 0.97 ratio         PTT - ( 25 Oct 2023 07:28 )  PTT:33.4 sec  Lactate Trend        CAPILLARY BLOOD GLUCOSE          Culture - Urine (collected 10-20-23 @ 18:57)  Source: Clean Catch Clean Catch (Midstream)  Final Report (10-23-23 @ 10:56):    >=3 organisms. Probable collection contamination.        RADIOLOGY & ADDITIONAL TESTS:    Imaging Personally Reviewed:  [ ] YES  [ ] NO    HEALTH ISSUES - PROBLEM Dx:          PHYSICAL EXAM:  GENERAL: NAD, well-developed.  HEAD:  Atraumatic, Normocephalic.  EYES: EOMI, PERRLA, conjunctiva and sclera clear.  NECK: Supple, No JVD.  CHEST/LUNG: Clear to auscultation bilaterally; No wheeze.  HEART: Regular rate and rhythm; S1 S2.   ABDOMEN: Soft, Nontender, Nondistended; Bowel sounds present.  EXTREMITIES:  2+ Peripheral Pulses, No clubbing, cyanosis, or edema.  PSYCH: AAOx3.  NEUROLOGY: non-focal.  SKIN: No rashes or lesions.

## 2023-10-25 NOTE — CHART NOTE - NSCHARTNOTEFT_GEN_A_CORE
PACU ANESTHESIA ADMISSION NOTE      Procedure:   Post op diagnosis:      ____  Intubated  TV:______       Rate: ______      FiO2: ______    _x___  Patent Airway    __x__  Full return of protective reflexes    __x__  Full recovery from anesthesia / back to baseline     Vitals:   T98:           R:  16                BP:132/82                  Sat: 98                  P: 75      Mental Status:  _x___ Awake   __x___ Alert   _____ Drowsy   _____ Sedated  x  Nausea/Vomiting:  ____ NO  ______Yes,   See Post - Op Orders          Pain Scale (0-10):  _____    Treatment: ____ None    ____ See Post - Op/PCA Orders    Post - Operative Fluids:   ____ Oral   ____ See Post - Op Orders    Plan: Discharge:   ____Home     x  _____Floor     _____Critical Care    _____  Other:_________________    Comments:

## 2023-10-25 NOTE — PROGRESS NOTE ADULT - SUBJECTIVE AND OBJECTIVE BOX
Brief Interval History  Patient is a 83y old Female who presents with a chief complaint of Syncope (25 Oct 2023 11:42)    URIEL MCDANIEL is admitted with a primary diagnosis of Syncope      Today is hospital day 5d. The patient was examined at the bedside this morning and appeared comfortable. For ICD placement today    Admission HPI  HPI:  83-year-old female with PMH a-fib, CHF, HTN, HLD presenting for witnessed syncopal episode that occurred while sitting at the kitchen table with family. Granddaughter at bedside states that patient was well and then suddenly lost consciousness and dropped her head forward but did not her head. Family moved patient from the table to the couch at which point she woke up, granddaughter estimates she was unconscious for about 2 minutes. Patient states that she was feeling fine prior to episode and after waking up felt a little bit dizzy but that resolved within 1/2-hour. Patient was admitted to the hospital on 10/15/2023 for CHF exacerbation; Family noted that vitals taken by paramedics showed HR in the 40s and low bp. Denies SOB, chest pain, weakness, lightheadedness, dizziness, vision change, extremity weakness, black or red stool, abdominal pain, nausea/vomiting, fever, recent illness.    In the ED bp was on the soft side 90/5, other vitals stable  Labs showed mild transaminitis, BNP 2700, trop negative.  UA mildly +, asymptomatic   cbc and bmp unremarkable   chest xray shows mild congestion, to my read.      (20 Oct 2023 20:22)      Past Medical and Surgical History    Social History  Social History:      Allergies  No Known Allergies    Medications  Standing Medications  atorvastatin 40 milliGRAM(s) Oral at bedtime  furosemide    Tablet 40 milliGRAM(s) Oral daily  metoprolol succinate ER 12.5 milliGRAM(s) Oral daily  sacubitril 49 mG/valsartan 51 mG 1 Tablet(s) Oral two times a day  sodium chloride 0.9%. 1000 milliLiter(s) IV Continuous <Continuous>  vancomycin  IVPB      vancomycin  IVPB 1000 milliGRAM(s) IV Intermittent once    PRN Medications  diazepam    Tablet 5 milliGRAM(s) Oral at bedtime PRN  melatonin 3 milliGRAM(s) Oral at bedtime PRN    Vitals  T(F): 97.1  HR: 68  BP: 105/58  RR: 18  SpO2: 99%    I&O's      Physical Examination  General: NAD, lying in bed comfortably  Head: NCAT  Neck: Supple, no JVD  Cardiac: Regular rate and rhythm. No murmurs, gallops, or rubs (PVCs, NSVT on tele)  Pulmonary: CTAB  Abdominal: Soft, nontender, and nondistended with positive bowel sounds  Extremities: No pitting edema  Neurologic: AOx3, nonfocal  Skin: No rashes or lesions    Labs                        13.0   6.69  )-----------( 255      ( 25 Oct 2023 07:28 )             41.9     10-25    140  |  103  |  33<H>  ----------------------------<  100<H>  4.6   |  28  |  1.0    Ca    9.2      25 Oct 2023 07:28  Mg     2.1     10-25    TPro  6.5  /  Alb  4.3  /  TBili  0.4  /  DBili  x   /  AST  16  /  ALT  24  /  AlkPhos  74  10-25    PT/INR - ( 25 Oct 2023 07:28 )   PT: 11.00 sec;   INR: 0.97 ratio         PTT - ( 25 Oct 2023 07:28 )  PTT:33.4 sec  Urinalysis Basic - ( 25 Oct 2023 07:28 )    Color: x / Appearance: x / SG: x / pH: x  Gluc: 100 mg/dL / Ketone: x  / Bili: x / Urobili: x   Blood: x / Protein: x / Nitrite: x   Leuk Esterase: x / RBC: x / WBC x   Sq Epi: x / Non Sq Epi: x / Bacteria: x                  Radiology  CXR      CT

## 2023-10-25 NOTE — PROGRESS NOTE ADULT - ASSESSMENT
83-year-old female with PMH a-fib, CHF, HTN, HLD presenting for witnessed syncopal episode that occurred while sitting at the kitchen table with family. Granddaughter at bedside states that patient was well and then suddenly lost consciousness and dropped her head forward but did not her head. Family moved patient from the table to the couch at which point she woke up, granddaughter estimates she was unconscious for about 2 minutes. Patient states that she was feeling fine prior to episode and after waking up felt a little bit dizzy but that resolved within 1/2-hour. Patient was admitted to the hospital on 10/15/2023 for CHF exacerbation; Family noted that vitals taken by paramedics showed HR in the 40s and low bp. Denies SOB, chest pain, weakness, lightheadedness, dizziness, vision change, extremity weakness, black or red stool, abdominal pain, nausea/vomiting, fever, recent illness.    #Syncope  - No preceding symptoms or prodrome  - ?Possible dehydration from diuretics  - Per family, patient eating/drinking minimally after last discharge 10/18  - Orthostatics negative  - C/w telemetry (NSVT and PVCs during stay)  - C/w entresto    #Acute systolic failure  - EF now 35-40%, outpatient echo showed EF 50-55%  - C/w metoprolol 12.5mg PO QD  - C/w lasix 40mg PO QD  - Eliquis dc'd per rec from EP  - Catheterization 10/23 non ischemic cardiomyopathy  - ICD placement today 10.25    #CKD Stage III  #HTN  #HLD   - Lisinopril changed to Entresto 5PM 10/25  - C/w atorvastatin 40mg PO at bedtime    #Misc  DVT ppx: Eliquis  GI ppx: None  Diet: DASH diet with fluid restriction  Activity: AAT

## 2023-10-25 NOTE — CHART NOTE - NSCHARTNOTEFT_GEN_A_CORE
Electrophysiology Brief Post-Operative Note    I have personally seen and examined the patient.  I agree with the history and physical which I have reviewed and noted any changes below.    DEVICE COMPANY:  -[employer]      PRE-OP DIAGNOSIS:   Chronic Systolic Hear Failure, Cardiomyopathy, syncope    POST-OP DIAGNOSIS:   Chronic Systolic Hear Failure, Cardiomyopathy, syncope    PROCEDURE:  Defibrillator Implant    Physician: ORION Jones MD  Assistant: None    ANESTHESIA TYPE:  [  ]General Anesthesia  [X] Sedation  [X] Local/Regional    CONDITION  [  ] Critical  [  ] Serious  [  ]Fair  [X]Good    SPECIMENS REMOVED (IF APPLICABLE): None    IMPLANTS (IF APPLICABLE)  Dual Chamber Defibrillator Implant    FINDINGS (see below)   -Successful Defibrillator implant   -No immediate complications   -Estimated Blood Loss: 20  mL   -Contrast used: none    PLAN OF CARE  - Vancomyocin 1g IV q12 h for 2 doses  - Chest X-ray portable now  - Chest X-ray PA/Lat tomorrow at 6am  - Device interrogation tomorrow in am  - Discontinue Heparin, Lovenox, and NOACS for 48 hours  - No anticoagulation unless discussed with EP attending  - Bedside Echo: no effusion      FOLLOW-UP  -Outpatient follow-up with Electrophysiology in 3-4 weeks     [address] [place] [address] ([phone])[facility] [phone]

## 2023-10-25 NOTE — PROGRESS NOTE ADULT - ASSESSMENT
84 yo F PMHx HTN, HLD, HFpEF presented to ED for evaluation of syncope. Pt was sitting at table when she became increasingly less responsive until she completely lost consciousness. Her granddaughter is an EMT and performed a sternal rub. Pt grimaced to sternal rub but did not regain consciousness. When family called EMS the dispatcher instructed them to lay her on the ground flat. When she was supine she almost immediately gained consciousness.     To note pt was discharged on 10/18 after simone was treated for volume overload due to decompensated chronic HFpEF due to missing lasix dose. During that time she developed LOLA but SCr returned to baseline without fluids or holding diuretics. Pt and family requested discharge on 10/18 as pt was developing anxiety from hospitalization and states she felt fine. ECHO not done as she requested it as outpt. Prior to last hospitalization she had a mechanical fall with resultant nasal bone fracture. Suspect pt may have syncopized at that point as well??    A/P:   Syncope  Neuro exam showed   EKG showed Normal Sinus Rhythm  Echo showed LVEF 35-40%  Telemetry monitor showed episode of NSVT 10/21, also multiple polymorphic PVCs.   Orthostatic changes negative.   As echo showed Low LVEF 35-40% and high suspicion of cardiac arrhythmia EP recommended ICD placement.     Acute HFrEF:   Nonischemic Cardiomyopathy:   Echo showed LVEF 35-40%.   Cardiac cath 10/23 showed no significant CAD.   Continue Metoprolol, Entresto and Lasix.     CKD III  - stable at bedside      HTN/HLD   - hold lisinopril given  low normal BP    DVT ppx: Lovenox SC  GI ppx: not indicated.   Diet: DASH diet with fluid restriction  Activity: as tolerated.     #Progress Note Handoff:  Pending (specify): Pending AICD  Family discussion:  Disposition: Home.

## 2023-10-25 NOTE — PROGRESS NOTE ADULT - ASSESSMENT
82 y/o Female with HTN, HLD, witnessed syncopal episode while sitting at dinner table at home without any prodromal symptoms. TTE was remarkable for depressed LV EF 35-40%. Patient had LHC that revealed normal coronaries.      Syncope highly concerning for cardiac etiology  NICM with depressed EF 35-40%  84 y/o Female with HTN, HLD, witnessed syncopal episode while sitting at dinner table at home without any prodromal symptoms. TTE was remarkable for depressed LV EF 35-40%. Patient had LHC that revealed normal coronaries.    Out-pt records were reviewed, had low EF, that improved on GDMT, then dropped again now. No h/o of AF mentioned    Syncope highly concerning for cardiac etiology  NICM with depressed EF 35-40% despite medical therapy    Risks and benefits of the procedure, ICD placement, discussed with patient and family at the bedside all in agreement  ADDon fo ICD placement today  keep NPO  stop AC, Eliquis, no evidence of AF, was NOT on AC prior to admission  Maintain electrolytes K>4.0 Mg >2.0  will follow

## 2023-10-25 NOTE — PROGRESS NOTE ADULT - SUBJECTIVE AND OBJECTIVE BOX
INTERVAL HPI/OVERNIGHT EVENTS:  no events on tele  discussed with patient and family at bedside  agreeable for ICD for primary prevention    MEDICATIONS  (STANDING):  atorvastatin 40 milliGRAM(s) Oral at bedtime  furosemide    Tablet 40 milliGRAM(s) Oral daily  metoprolol succinate ER 12.5 milliGRAM(s) Oral daily  sacubitril 49 mG/valsartan 51 mG 1 Tablet(s) Oral two times a day  sodium chloride 0.9%. 1000 milliLiter(s) (100 mL/Hr) IV Continuous <Continuous>    MEDICATIONS  (PRN):  diazepam    Tablet 5 milliGRAM(s) Oral at bedtime PRN for insomnia  melatonin 3 milliGRAM(s) Oral at bedtime PRN Insomnia      Allergies    No Known Allergies    Intolerances        REVIEW OF SYSTEMS    x] A ten-point review of systems was otherwise negative except as noted.  [ ] Due to altered mental status/intubation, subjective information were not able to be obtained from the patient. History was obtained, to the extent possible, from review of the chart and collateral sources of information.      Vital Signs Last 24 Hrs  T(C): 36.1 (25 Oct 2023 05:00), Max: 36.8 (24 Oct 2023 20:46)  T(F): 97 (25 Oct 2023 05:00), Max: 98.3 (24 Oct 2023 20:46)  HR: 86 (25 Oct 2023 05:00) (76 - 86)  BP: 104/66 (25 Oct 2023 05:00) (102/57 - 116/45)  BP(mean): --  RR: 18 (25 Oct 2023 05:00) (18 - 18)  SpO2: --          PHYSICAL EXAM:    GENERAL: In no apparent distress, well nourished, and hydrated.  HEART: Regular rate and rhythm; No murmur; NO rubs, or gallops.  PULMONARY: Clear to auscultation and percussion.  Normal expansion/effort. No rales, wheezing, or rhonchi bilaterally.  ABDOMEN: Soft, Nontender, Nondistended; Bowel sounds present  EXTREMITIES:  Extremities warm, pink, well-perfused, 2+ Peripheral Pulses, No clubbing, cyanosis, or edema  NEUROLOGICAL: alert & oriented x 3, no focal deficits, PERRLA, EOMI    LABS:                        13.0   6.69  )-----------( 255      ( 25 Oct 2023 07:28 )             41.9     10-25    140  |  103  |  33<H>  ----------------------------<  100<H>  4.6   |  28  |  1.0    Ca    9.2      25 Oct 2023 07:28  Mg     2.1     10-25    TPro  6.5  /  Alb  4.3  /  TBili  0.4  /  DBili  x   /  AST  16  /  ALT  24  /  AlkPhos  74  10-25    PT/INR - ( 25 Oct 2023 07:28 )   PT: 11.00 sec;   INR: 0.97 ratio         PTT - ( 25 Oct 2023 07:28 )  PTT:33.4 sec  Urinalysis Basic - ( 25 Oct 2023 07:28 )    Color: x / Appearance: x / SG: x / pH: x  Gluc: 100 mg/dL / Ketone: x  / Bili: x / Urobili: x   Blood: x / Protein: x / Nitrite: x   Leuk Esterase: x / RBC: x / WBC x   Sq Epi: x / Non Sq Epi: x / Bacteria: x              RADIOLOGY & ADDITIONAL TESTS:

## 2023-10-26 VITALS
HEART RATE: 90 BPM | RESPIRATION RATE: 18 BRPM | DIASTOLIC BLOOD PRESSURE: 67 MMHG | SYSTOLIC BLOOD PRESSURE: 102 MMHG | TEMPERATURE: 97 F

## 2023-10-26 LAB
ALBUMIN SERPL ELPH-MCNC: 3.7 G/DL — SIGNIFICANT CHANGE UP (ref 3.5–5.2)
ALBUMIN SERPL ELPH-MCNC: 3.7 G/DL — SIGNIFICANT CHANGE UP (ref 3.5–5.2)
ALP SERPL-CCNC: 70 U/L — SIGNIFICANT CHANGE UP (ref 30–115)
ALP SERPL-CCNC: 70 U/L — SIGNIFICANT CHANGE UP (ref 30–115)
ALT FLD-CCNC: 19 U/L — SIGNIFICANT CHANGE UP (ref 0–41)
ALT FLD-CCNC: 19 U/L — SIGNIFICANT CHANGE UP (ref 0–41)
ANION GAP SERPL CALC-SCNC: 13 MMOL/L — SIGNIFICANT CHANGE UP (ref 7–14)
ANION GAP SERPL CALC-SCNC: 13 MMOL/L — SIGNIFICANT CHANGE UP (ref 7–14)
AST SERPL-CCNC: 16 U/L — SIGNIFICANT CHANGE UP (ref 0–41)
AST SERPL-CCNC: 16 U/L — SIGNIFICANT CHANGE UP (ref 0–41)
BASOPHILS # BLD AUTO: 0.04 K/UL — SIGNIFICANT CHANGE UP (ref 0–0.2)
BASOPHILS # BLD AUTO: 0.04 K/UL — SIGNIFICANT CHANGE UP (ref 0–0.2)
BASOPHILS NFR BLD AUTO: 0.4 % — SIGNIFICANT CHANGE UP (ref 0–1)
BASOPHILS NFR BLD AUTO: 0.4 % — SIGNIFICANT CHANGE UP (ref 0–1)
BILIRUB SERPL-MCNC: 0.6 MG/DL — SIGNIFICANT CHANGE UP (ref 0.2–1.2)
BILIRUB SERPL-MCNC: 0.6 MG/DL — SIGNIFICANT CHANGE UP (ref 0.2–1.2)
BUN SERPL-MCNC: 30 MG/DL — HIGH (ref 10–20)
BUN SERPL-MCNC: 30 MG/DL — HIGH (ref 10–20)
CALCIUM SERPL-MCNC: 8.9 MG/DL — SIGNIFICANT CHANGE UP (ref 8.4–10.4)
CALCIUM SERPL-MCNC: 8.9 MG/DL — SIGNIFICANT CHANGE UP (ref 8.4–10.4)
CHLORIDE SERPL-SCNC: 102 MMOL/L — SIGNIFICANT CHANGE UP (ref 98–110)
CHLORIDE SERPL-SCNC: 102 MMOL/L — SIGNIFICANT CHANGE UP (ref 98–110)
CO2 SERPL-SCNC: 25 MMOL/L — SIGNIFICANT CHANGE UP (ref 17–32)
CO2 SERPL-SCNC: 25 MMOL/L — SIGNIFICANT CHANGE UP (ref 17–32)
CREAT SERPL-MCNC: 1.1 MG/DL — SIGNIFICANT CHANGE UP (ref 0.7–1.5)
CREAT SERPL-MCNC: 1.1 MG/DL — SIGNIFICANT CHANGE UP (ref 0.7–1.5)
EGFR: 49 ML/MIN/1.73M2 — LOW
EGFR: 49 ML/MIN/1.73M2 — LOW
EOSINOPHIL # BLD AUTO: 0.06 K/UL — SIGNIFICANT CHANGE UP (ref 0–0.7)
EOSINOPHIL # BLD AUTO: 0.06 K/UL — SIGNIFICANT CHANGE UP (ref 0–0.7)
EOSINOPHIL NFR BLD AUTO: 0.7 % — SIGNIFICANT CHANGE UP (ref 0–8)
EOSINOPHIL NFR BLD AUTO: 0.7 % — SIGNIFICANT CHANGE UP (ref 0–8)
GLUCOSE SERPL-MCNC: 154 MG/DL — HIGH (ref 70–99)
GLUCOSE SERPL-MCNC: 154 MG/DL — HIGH (ref 70–99)
HCT VFR BLD CALC: 45 % — SIGNIFICANT CHANGE UP (ref 37–47)
HCT VFR BLD CALC: 45 % — SIGNIFICANT CHANGE UP (ref 37–47)
HGB BLD-MCNC: 13.9 G/DL — SIGNIFICANT CHANGE UP (ref 12–16)
HGB BLD-MCNC: 13.9 G/DL — SIGNIFICANT CHANGE UP (ref 12–16)
IMM GRANULOCYTES NFR BLD AUTO: 0.3 % — SIGNIFICANT CHANGE UP (ref 0.1–0.3)
IMM GRANULOCYTES NFR BLD AUTO: 0.3 % — SIGNIFICANT CHANGE UP (ref 0.1–0.3)
LYMPHOCYTES # BLD AUTO: 2.44 K/UL — SIGNIFICANT CHANGE UP (ref 1.2–3.4)
LYMPHOCYTES # BLD AUTO: 2.44 K/UL — SIGNIFICANT CHANGE UP (ref 1.2–3.4)
LYMPHOCYTES # BLD AUTO: 26.6 % — SIGNIFICANT CHANGE UP (ref 20.5–51.1)
LYMPHOCYTES # BLD AUTO: 26.6 % — SIGNIFICANT CHANGE UP (ref 20.5–51.1)
MAGNESIUM SERPL-MCNC: 2.1 MG/DL — SIGNIFICANT CHANGE UP (ref 1.8–2.4)
MAGNESIUM SERPL-MCNC: 2.1 MG/DL — SIGNIFICANT CHANGE UP (ref 1.8–2.4)
MCHC RBC-ENTMCNC: 28.4 PG — SIGNIFICANT CHANGE UP (ref 27–31)
MCHC RBC-ENTMCNC: 28.4 PG — SIGNIFICANT CHANGE UP (ref 27–31)
MCHC RBC-ENTMCNC: 30.9 G/DL — LOW (ref 32–37)
MCHC RBC-ENTMCNC: 30.9 G/DL — LOW (ref 32–37)
MCV RBC AUTO: 91.8 FL — SIGNIFICANT CHANGE UP (ref 81–99)
MCV RBC AUTO: 91.8 FL — SIGNIFICANT CHANGE UP (ref 81–99)
MONOCYTES # BLD AUTO: 0.66 K/UL — HIGH (ref 0.1–0.6)
MONOCYTES # BLD AUTO: 0.66 K/UL — HIGH (ref 0.1–0.6)
MONOCYTES NFR BLD AUTO: 7.2 % — SIGNIFICANT CHANGE UP (ref 1.7–9.3)
MONOCYTES NFR BLD AUTO: 7.2 % — SIGNIFICANT CHANGE UP (ref 1.7–9.3)
NEUTROPHILS # BLD AUTO: 5.93 K/UL — SIGNIFICANT CHANGE UP (ref 1.4–6.5)
NEUTROPHILS # BLD AUTO: 5.93 K/UL — SIGNIFICANT CHANGE UP (ref 1.4–6.5)
NEUTROPHILS NFR BLD AUTO: 64.8 % — SIGNIFICANT CHANGE UP (ref 42.2–75.2)
NEUTROPHILS NFR BLD AUTO: 64.8 % — SIGNIFICANT CHANGE UP (ref 42.2–75.2)
NRBC # BLD: 0 /100 WBCS — SIGNIFICANT CHANGE UP (ref 0–0)
NRBC # BLD: 0 /100 WBCS — SIGNIFICANT CHANGE UP (ref 0–0)
PLATELET # BLD AUTO: 258 K/UL — SIGNIFICANT CHANGE UP (ref 130–400)
PLATELET # BLD AUTO: 258 K/UL — SIGNIFICANT CHANGE UP (ref 130–400)
PMV BLD: 9.8 FL — SIGNIFICANT CHANGE UP (ref 7.4–10.4)
PMV BLD: 9.8 FL — SIGNIFICANT CHANGE UP (ref 7.4–10.4)
POTASSIUM SERPL-MCNC: 4.5 MMOL/L — SIGNIFICANT CHANGE UP (ref 3.5–5)
POTASSIUM SERPL-MCNC: 4.5 MMOL/L — SIGNIFICANT CHANGE UP (ref 3.5–5)
POTASSIUM SERPL-SCNC: 4.5 MMOL/L — SIGNIFICANT CHANGE UP (ref 3.5–5)
POTASSIUM SERPL-SCNC: 4.5 MMOL/L — SIGNIFICANT CHANGE UP (ref 3.5–5)
PROT SERPL-MCNC: 6.1 G/DL — SIGNIFICANT CHANGE UP (ref 6–8)
PROT SERPL-MCNC: 6.1 G/DL — SIGNIFICANT CHANGE UP (ref 6–8)
RBC # BLD: 4.9 M/UL — SIGNIFICANT CHANGE UP (ref 4.2–5.4)
RBC # BLD: 4.9 M/UL — SIGNIFICANT CHANGE UP (ref 4.2–5.4)
RBC # FLD: 13.9 % — SIGNIFICANT CHANGE UP (ref 11.5–14.5)
RBC # FLD: 13.9 % — SIGNIFICANT CHANGE UP (ref 11.5–14.5)
SODIUM SERPL-SCNC: 140 MMOL/L — SIGNIFICANT CHANGE UP (ref 135–146)
SODIUM SERPL-SCNC: 140 MMOL/L — SIGNIFICANT CHANGE UP (ref 135–146)
WBC # BLD: 9.16 K/UL — SIGNIFICANT CHANGE UP (ref 4.8–10.8)
WBC # BLD: 9.16 K/UL — SIGNIFICANT CHANGE UP (ref 4.8–10.8)
WBC # FLD AUTO: 9.16 K/UL — SIGNIFICANT CHANGE UP (ref 4.8–10.8)
WBC # FLD AUTO: 9.16 K/UL — SIGNIFICANT CHANGE UP (ref 4.8–10.8)

## 2023-10-26 PROCEDURE — 99239 HOSP IP/OBS DSCHRG MGMT >30: CPT | Mod: GC

## 2023-10-26 PROCEDURE — 71046 X-RAY EXAM CHEST 2 VIEWS: CPT | Mod: 26

## 2023-10-26 PROCEDURE — 93283 PRGRMG EVAL IMPLANTABLE DFB: CPT | Mod: 26

## 2023-10-26 PROCEDURE — 99024 POSTOP FOLLOW-UP VISIT: CPT

## 2023-10-26 PROCEDURE — 76604 US EXAM CHEST: CPT | Mod: 26

## 2023-10-26 RX ORDER — METOPROLOL TARTRATE 50 MG
1 TABLET ORAL
Qty: 30 | Refills: 1
Start: 2023-10-26 | End: 2023-12-24

## 2023-10-26 RX ORDER — SACUBITRIL AND VALSARTAN 24; 26 MG/1; MG/1
1 TABLET, FILM COATED ORAL
Qty: 60 | Refills: 1
Start: 2023-10-26 | End: 2023-12-24

## 2023-10-26 RX ORDER — METOPROLOL TARTRATE 50 MG
50 TABLET ORAL DAILY
Refills: 0 | Status: DISCONTINUED | OUTPATIENT
Start: 2023-10-26 | End: 2023-10-26

## 2023-10-26 RX ORDER — CARVEDILOL PHOSPHATE 80 MG/1
1 CAPSULE, EXTENDED RELEASE ORAL
Refills: 0 | DISCHARGE

## 2023-10-26 RX ORDER — LISINOPRIL 2.5 MG/1
1 TABLET ORAL
Refills: 0 | DISCHARGE

## 2023-10-26 RX ADMIN — SACUBITRIL AND VALSARTAN 1 TABLET(S): 24; 26 TABLET, FILM COATED ORAL at 05:38

## 2023-10-26 RX ADMIN — Medication 250 MILLIGRAM(S): at 06:11

## 2023-10-26 RX ADMIN — Medication 50 MILLIGRAM(S): at 13:09

## 2023-10-26 RX ADMIN — Medication 5 MILLIGRAM(S): at 00:10

## 2023-10-26 RX ADMIN — Medication 40 MILLIGRAM(S): at 05:37

## 2023-10-26 RX ADMIN — Medication 12.5 MILLIGRAM(S): at 05:38

## 2023-10-26 NOTE — PROGRESS NOTE ADULT - ASSESSMENT
A/P   Patient s/p MDT ICD implant by Dr. Jones on 10/26/2023. Patient tolerated the procedure well. No events overnight.   Telemetry reviewed, multiple NSVT and polymorphic frequent PVC noted.       - CXR is done, official report is pending  - Device interrogation done, device functions well  - Please complete post procedure antibiotics prophylaxis ordered  - Please increase Toprol to 50 mg daily given frequent PVCs and NSVT  - FU in the EP office for wound check with Dr. Jones in 1 month    No Heavy lifting >5 lbs. Do not raise your arm above shoulder level for 4-6 weeks.   No driving for 2 weeks  No shower, bathtub, no wetting device site for 5 days.  Can remove the bandage and take a shower on 10/30/2023. Please leave steristrips intact and let them to fall off on their own.     A/P   Patient s/p MDT ICD implant by Dr. Jones on 10/26/2023. Patient tolerated the procedure well. No events overnight.   Telemetry reviewed, multiple NSVT and polymorphic frequent PVC noted.       - CXR is done, official report is pending  - Device interrogation done, device functions well  - Please complete post procedure antibiotics prophylaxis ordered  - Please increase Toprol to 50 mg daily given frequent PVCs and NSVT  - FU in the EP office for wound check with Dr. Jones in 1 month    No Heavy lifting >5 lbs. Do not raise your arm above shoulder level for 4-6 weeks.   No driving for 2 weeks  No shower, bathtub, no wetting device site for 5 days.  Can remove the bandage and take a shower on 10/30/2023. Please leave steristrips intact and let them to fall off on their own.    All post op instructions were discussed as well with the patient's daughter Hailey over the phone.  All questions answered.

## 2023-10-26 NOTE — PROGRESS NOTE ADULT - SUBJECTIVE AND OBJECTIVE BOX
URIEL MCDANIEL  83y  Female      Patient is a 83y old  Female who presents with a chief complaint of Syncope (26 Oct 2023 10:20)      INTERVAL HPI/OVERNIGHT EVENTS:  She went for AICD today, no SOB or palpitation, noted with NSVT on tele.   Vital Signs Last 24 Hrs  T(C): 36.4 (26 Oct 2023 04:37), Max: 36.5 (25 Oct 2023 21:28)  T(F): 97.5 (26 Oct 2023 04:37), Max: 97.7 (25 Oct 2023 21:28)  HR: 72 (26 Oct 2023 04:37) (68 - 83)  BP: 105/64 (26 Oct 2023 04:37) (105/58 - 150/83)  BP(mean): 80 (26 Oct 2023 04:37) (54 - 110)  RR: 18 (26 Oct 2023 04:37) (18 - 20)  SpO2: 98% (25 Oct 2023 19:01) (97% - 99%)    Parameters below as of 25 Oct 2023 19:01  Patient On (Oxygen Delivery Method): room air          10-25-23 @ 07:01  -  10-26-23 @ 07:00  --------------------------------------------------------  IN: 550 mL / OUT: 0 mL / NET: 550 mL            Consultant(s) Notes Reviewed:  [x ] YES  [ ] NO          MEDICATIONS  (STANDING):  atorvastatin 40 milliGRAM(s) Oral at bedtime  furosemide    Tablet 40 milliGRAM(s) Oral daily  lactated ringers. 1000 milliLiter(s) (75 mL/Hr) IV Continuous <Continuous>  metoprolol succinate ER 50 milliGRAM(s) Oral daily  sacubitril 49 mG/valsartan 51 mG 1 Tablet(s) Oral two times a day  sodium chloride 0.9%. 1000 milliLiter(s) (100 mL/Hr) IV Continuous <Continuous>  vancomycin  IVPB      vancomycin  IVPB 1000 milliGRAM(s) IV Intermittent every 12 hours    MEDICATIONS  (PRN):  diazepam    Tablet 5 milliGRAM(s) Oral at bedtime PRN for insomnia  melatonin 3 milliGRAM(s) Oral at bedtime PRN Insomnia  ondansetron Injectable 4 milliGRAM(s) IV Push once PRN Nausea and/or Vomiting  oxycodone    5 mG/acetaminophen 325 mG 2 Tablet(s) Oral once PRN Severe Pain (7 - 10)      LABS                          13.9   9.16  )-----------( 258      ( 26 Oct 2023 06:49 )             45.0     10-26    140  |  102  |  30<H>  ----------------------------<  154<H>  4.5   |  25  |  1.1    Ca    8.9      26 Oct 2023 06:49  Mg     2.1     10-26    TPro  6.1  /  Alb  3.7  /  TBili  0.6  /  DBili  x   /  AST  16  /  ALT  19  /  AlkPhos  70  10-26      Urinalysis Basic - ( 26 Oct 2023 06:49 )    Color: x / Appearance: x / SG: x / pH: x  Gluc: 154 mg/dL / Ketone: x  / Bili: x / Urobili: x   Blood: x / Protein: x / Nitrite: x   Leuk Esterase: x / RBC: x / WBC x   Sq Epi: x / Non Sq Epi: x / Bacteria: x      PT/INR - ( 25 Oct 2023 07:28 )   PT: 11.00 sec;   INR: 0.97 ratio         PTT - ( 25 Oct 2023 07:28 )  PTT:33.4 sec  Lactate Trend        CAPILLARY BLOOD GLUCOSE          Culture - Urine (collected 10-20-23 @ 18:57)  Source: Clean Catch Clean Catch (Midstream)  Final Report (10-23-23 @ 10:56):    >=3 organisms. Probable collection contamination.        RADIOLOGY & ADDITIONAL TESTS:    Imaging Personally Reviewed:  [ ] YES  [ ] NO    HEALTH ISSUES - PROBLEM Dx:          PHYSICAL EXAM:  GENERAL: NAD, well-developed.  HEAD:  Atraumatic, Normocephalic.  EYES: EOMI, PERRLA, conjunctiva and sclera clear.  NECK: Supple, No JVD.  CHEST/LUNG: Clear to auscultation bilaterally; No wheeze.  HEART: Regular rate and rhythm; S1 S2.   ABDOMEN: Soft, Nontender, Nondistended; Bowel sounds present.  EXTREMITIES:  2+ Peripheral Pulses, No clubbing, cyanosis, or edema.  PSYCH: AAOx3.  NEUROLOGY: non-focal.  SKIN: No rashes or lesions.

## 2023-10-26 NOTE — PROGRESS NOTE ADULT - ASSESSMENT
84 yo F PMHx HTN, HLD, HFpEF presented to ED for evaluation of syncope. Pt was sitting at table when she became increasingly less responsive until she completely lost consciousness.    A/P:   Syncope:   Non sustained Ventricular Tachycardia:   Neuro exam showed normal CN II-XII, strength 5/5 in all limbs  EKG showed Normal Sinus Rhythm  Echo showed LVEF 35-40%  Telemetry monitor showed episode of NSVT 10/21, also multiple polymorphic PVCs.   Orthostatic changes negative.   As echo showed Low LVEF 35-40% and high suspicion of cardiac arrhythmia EP recommended ICD placement.   s/p AICD Placement  Increase Metoprolol to 50mg daily.     Acute HFrEF:   Nonischemic Cardiomyopathy:   Echo showed LVEF 35-40%.   Cardiac cath 10/23 showed no significant CAD.   Continue Metoprolol, Entresto and Lasix.     CKD III  stable at bedside    HTN/HLD   Hold lisinopril given  low normal BP    DVT ppx: Lovenox SC  GI ppx: not indicated.   Diet: DASH diet with fluid restriction  Activity: as tolerated.     #Progress Note Handoff:  Pending (specify):   Family discussion:  Disposition: Home today.

## 2023-10-26 NOTE — PROGRESS NOTE ADULT - NS ATTEND AMEND GEN_ALL_CORE FT
Agreeable for ICD  d/w daughter, son at bedside    I discussed the risks, benefits and alternatives for device implantation.    I reported a risk of major complications at 1% and minor complications at 3%. The details of the procedure and risks associated with undergoing the procedure were discussed in detail including but not limited to, death, myocardial ischemia, stroke, cardiac perforation, potential need for temporary pacemaker implantation, lung damage requiring chest tube (pneumothorax), blood clot, blood collection requiring blood transfusion or repeat surgery (hematoma), infection, or vascular injury. All questions were answered to satisfaction and the patient expressed verbal understanding of the procedure, the benefits, and risks. The patient agreed for the procedure.    ROSE MARIE Garcia
complex patient  s/p ICD implant   no complications
[facility], [facility], old records reviewed    Syncope high suspicion of arrhytmogenic in nature  Recurrent NICM    Needs ICD  I discussed the risks, benefits and alternatives for device implantation.    I reported a risk of major complications at 1% and minor complications at 3%. The details of the procedure and risks associated with undergoing the procedure were discussed in detail including but not limited to, death, myocardial ischemia, stroke, cardiac perforation, potential need for temporary pacemaker implantation, lung damage requiring chest tube (pneumothorax), blood clot, blood collection requiring blood transfusion or repeat surgery (hematoma), infection, or vascular injury. All questions were answered to satisfaction and the patient expressed verbal understanding of the procedure, the benefits, and risks.    Patient wants to discuss with family and decide    Offered MRI- don't want to do due to claustorphobia. Offered her option of sedation. Doesn't want to do it.  GDMT

## 2023-10-26 NOTE — PROGRESS NOTE ADULT - PROVIDER SPECIALTY LIST ADULT
Electrophysiology
Internal Medicine
Hospitalist
Internal Medicine
Internal Medicine
Electrophysiology
Electrophysiology
Internal Medicine
Hospitalist
Internal Medicine

## 2023-10-26 NOTE — PROGRESS NOTE ADULT - SUBJECTIVE AND OBJECTIVE BOX
INTERVAL HPI/OVERNIGHT EVENTS:    Patient s/p MDT ICD implant by Dr. Jones on 10/26/2023.  No event over night. Pt without complains    MEDICATIONS  (STANDING):  atorvastatin 40 milliGRAM(s) Oral at bedtime  furosemide    Tablet 40 milliGRAM(s) Oral daily  lactated ringers. 1000 milliLiter(s) (75 mL/Hr) IV Continuous <Continuous>  metoprolol succinate ER 50 milliGRAM(s) Oral daily  metoprolol succinate ER 12.5 milliGRAM(s) Oral daily  sacubitril 49 mG/valsartan 51 mG 1 Tablet(s) Oral two times a day  sodium chloride 0.9%. 1000 milliLiter(s) (100 mL/Hr) IV Continuous <Continuous>  vancomycin  IVPB      vancomycin  IVPB 1000 milliGRAM(s) IV Intermittent every 12 hours    MEDICATIONS  (PRN):  diazepam    Tablet 5 milliGRAM(s) Oral at bedtime PRN for insomnia  melatonin 3 milliGRAM(s) Oral at bedtime PRN Insomnia  ondansetron Injectable 4 milliGRAM(s) IV Push once PRN Nausea and/or Vomiting  oxycodone    5 mG/acetaminophen 325 mG 2 Tablet(s) Oral once PRN Severe Pain (7 - 10)      Allergies    No Known Allergies    Intolerances          Vital Signs Last 24 Hrs  T(C): 36.4 (26 Oct 2023 04:37), Max: 36.5 (25 Oct 2023 21:28)  T(F): 97.5 (26 Oct 2023 04:37), Max: 97.7 (25 Oct 2023 21:28)  HR: 72 (26 Oct 2023 04:37) (68 - 83)  BP: 105/64 (26 Oct 2023 04:37) (105/58 - 150/83)  BP(mean): 80 (26 Oct 2023 04:37) (54 - 110)  RR: 18 (26 Oct 2023 04:37) (18 - 20)  SpO2: 98% (25 Oct 2023 19:01) (97% - 99%)    Parameters below as of 25 Oct 2023 19:01  Patient On (Oxygen Delivery Method): room air        GENERAL: In no apparent distress, well nourished, and hydrated.  HEART: Regular rate and rhythm; No murmurs, rubs, or gallops.  	Wound healing well; No hematoma; no bleeding  PULMONARY: Clear to auscultation and perfusion.  No rales, wheezing, or rhonchi bilaterally.  ABDOMEN: Soft, Nontender, Nondistended; Bowel sounds present  EXTREMITIES:  2+ Peripheral Pulses, No clubbing, cyanosis, or edema  NEUROLOGICAL: Grossly nonfocal        RADIOLOGY & ADDITIONAL TESTS:      A/P   Patient s/p ICD/ PPM/ BiV- ICD implant    - CXR  - Device interrogation done, awaiting review by attending  - Keflex 500 mg PO q12 h x 5 days  - Bactrum DS q12h x 5 days  - FU in the EP office for wound check with ___    No Heavy lifting >5 lbs. Do not raise your arm above shoulder level for 4-6 weeks.   No driving for 2weeks  No shower, bathtub, no wetting device site for 5 days.  Can take a shower on _  _ , remove dressing at that time, leave Steri-strips in place  Wound and dressing instructions per Dr Rg team INTERVAL HPI/OVERNIGHT EVENTS:    Patient s/p MDT ICD implant by Dr. Jones on 10/26/2023.  No event over night. Pt without complains    MEDICATIONS  (STANDING):  atorvastatin 40 milliGRAM(s) Oral at bedtime  furosemide    Tablet 40 milliGRAM(s) Oral daily  lactated ringers. 1000 milliLiter(s) (75 mL/Hr) IV Continuous <Continuous>  metoprolol succinate ER 50 milliGRAM(s) Oral daily  metoprolol succinate ER 12.5 milliGRAM(s) Oral daily  sacubitril 49 mG/valsartan 51 mG 1 Tablet(s) Oral two times a day  sodium chloride 0.9%. 1000 milliLiter(s) (100 mL/Hr) IV Continuous <Continuous>  vancomycin  IVPB      vancomycin  IVPB 1000 milliGRAM(s) IV Intermittent every 12 hours    MEDICATIONS  (PRN):  diazepam    Tablet 5 milliGRAM(s) Oral at bedtime PRN for insomnia  melatonin 3 milliGRAM(s) Oral at bedtime PRN Insomnia  ondansetron Injectable 4 milliGRAM(s) IV Push once PRN Nausea and/or Vomiting  oxycodone    5 mG/acetaminophen 325 mG 2 Tablet(s) Oral once PRN Severe Pain (7 - 10)      Allergies    No Known Allergies    Intolerances          Vital Signs Last 24 Hrs  T(C): 36.4 (26 Oct 2023 04:37), Max: 36.5 (25 Oct 2023 21:28)  T(F): 97.5 (26 Oct 2023 04:37), Max: 97.7 (25 Oct 2023 21:28)  HR: 72 (26 Oct 2023 04:37) (68 - 83)  BP: 105/64 (26 Oct 2023 04:37) (105/58 - 150/83)  BP(mean): 80 (26 Oct 2023 04:37) (54 - 110)  RR: 18 (26 Oct 2023 04:37) (18 - 20)  SpO2: 98% (25 Oct 2023 19:01) (97% - 99%)    Parameters below as of 25 Oct 2023 19:01  Patient On (Oxygen Delivery Method): room air        GENERAL: In no apparent distress, well nourished, and hydrated.  HEART: Regular rate and rhythm; No murmurs, rubs, or gallops.  	Wound healing well; No hematoma; no bleeding  PULMONARY: Clear to auscultation and perfusion.  No rales, wheezing, or rhonchi bilaterally.  ABDOMEN: Soft, Nontender, Nondistended; Bowel sounds present  EXTREMITIES:  2+ Peripheral Pulses, No clubbing, cyanosis, or edema  NEUROLOGICAL: Grossly nonfocal    Pressure dressing was removed from the chest. Telfa/Tegaderm is clean, dry, and intact. No swelling, no drain age, no hematoma.     RADIOLOGY & ADDITIONAL TESTS:  Chest x-ray is done in the morning. Picture reviewed Official reading pending.

## 2023-10-27 PROBLEM — Z00.00 ENCOUNTER FOR PREVENTIVE HEALTH EXAMINATION: Status: ACTIVE | Noted: 2023-10-27

## 2023-11-03 DIAGNOSIS — I47.20 VENTRICULAR TACHYCARDIA, UNSPECIFIED: ICD-10-CM

## 2023-11-03 DIAGNOSIS — I50.23 ACUTE ON CHRONIC SYSTOLIC (CONGESTIVE) HEART FAILURE: ICD-10-CM

## 2023-11-03 DIAGNOSIS — Z79.899 OTHER LONG TERM (CURRENT) DRUG THERAPY: ICD-10-CM

## 2023-11-03 DIAGNOSIS — N18.30 CHRONIC KIDNEY DISEASE, STAGE 3 UNSPECIFIED: ICD-10-CM

## 2023-11-03 DIAGNOSIS — R74.01 ELEVATION OF LEVELS OF LIVER TRANSAMINASE LEVELS: ICD-10-CM

## 2023-11-03 DIAGNOSIS — Z87.891 PERSONAL HISTORY OF NICOTINE DEPENDENCE: ICD-10-CM

## 2023-11-03 DIAGNOSIS — I49.1 ATRIAL PREMATURE DEPOLARIZATION: ICD-10-CM

## 2023-11-03 DIAGNOSIS — F41.9 ANXIETY DISORDER, UNSPECIFIED: ICD-10-CM

## 2023-11-03 DIAGNOSIS — Z11.52 ENCOUNTER FOR SCREENING FOR COVID-19: ICD-10-CM

## 2023-11-03 DIAGNOSIS — E87.5 HYPERKALEMIA: ICD-10-CM

## 2023-11-03 DIAGNOSIS — I49.3 VENTRICULAR PREMATURE DEPOLARIZATION: ICD-10-CM

## 2023-11-03 DIAGNOSIS — N30.90 CYSTITIS, UNSPECIFIED WITHOUT HEMATURIA: ICD-10-CM

## 2023-11-03 DIAGNOSIS — E78.5 HYPERLIPIDEMIA, UNSPECIFIED: ICD-10-CM

## 2023-11-03 DIAGNOSIS — I48.91 UNSPECIFIED ATRIAL FIBRILLATION: ICD-10-CM

## 2023-11-03 DIAGNOSIS — I42.8 OTHER CARDIOMYOPATHIES: ICD-10-CM

## 2023-11-03 DIAGNOSIS — Z79.01 LONG TERM (CURRENT) USE OF ANTICOAGULANTS: ICD-10-CM

## 2023-11-03 DIAGNOSIS — I13.0 HYPERTENSIVE HEART AND CHRONIC KIDNEY DISEASE WITH HEART FAILURE AND STAGE 1 THROUGH STAGE 4 CHRONIC KIDNEY DISEASE, OR UNSPECIFIED CHRONIC KIDNEY DISEASE: ICD-10-CM

## 2023-11-03 DIAGNOSIS — R55 SYNCOPE AND COLLAPSE: ICD-10-CM

## 2023-11-08 ENCOUNTER — APPOINTMENT (OUTPATIENT)
Dept: INTERNAL MEDICINE | Facility: CLINIC | Age: 83
End: 2023-11-08

## 2023-11-08 ENCOUNTER — APPOINTMENT (OUTPATIENT)
Dept: CARDIOLOGY | Facility: CLINIC | Age: 83
End: 2023-11-08
Payer: MEDICARE

## 2023-11-08 VITALS
WEIGHT: 168 LBS | SYSTOLIC BLOOD PRESSURE: 120 MMHG | HEIGHT: 61 IN | BODY MASS INDEX: 31.72 KG/M2 | DIASTOLIC BLOOD PRESSURE: 70 MMHG | HEART RATE: 73 BPM

## 2023-11-08 DIAGNOSIS — Z78.9 OTHER SPECIFIED HEALTH STATUS: ICD-10-CM

## 2023-11-08 PROCEDURE — 99215 OFFICE O/P EST HI 40 MIN: CPT | Mod: 25

## 2023-11-08 PROCEDURE — 93000 ELECTROCARDIOGRAM COMPLETE: CPT

## 2023-11-08 RX ORDER — SACUBITRIL AND VALSARTAN 49; 51 MG/1; MG/1
49-51 TABLET, FILM COATED ORAL TWICE DAILY
Refills: 0 | Status: DISCONTINUED | COMMUNITY
End: 2023-11-08

## 2023-12-01 ENCOUNTER — OUTPATIENT (OUTPATIENT)
Dept: OUTPATIENT SERVICES | Facility: HOSPITAL | Age: 83
LOS: 1 days | End: 2023-12-01
Payer: MEDICARE

## 2023-12-01 ENCOUNTER — APPOINTMENT (OUTPATIENT)
Dept: ELECTROPHYSIOLOGY | Facility: CLINIC | Age: 83
End: 2023-12-01
Payer: MEDICARE

## 2023-12-01 ENCOUNTER — RESULT REVIEW (OUTPATIENT)
Age: 83
End: 2023-12-01

## 2023-12-01 VITALS
DIASTOLIC BLOOD PRESSURE: 70 MMHG | BODY MASS INDEX: 30.58 KG/M2 | SYSTOLIC BLOOD PRESSURE: 108 MMHG | HEIGHT: 61 IN | WEIGHT: 162 LBS | HEART RATE: 78 BPM

## 2023-12-01 VITALS — BODY MASS INDEX: 30.58 KG/M2 | HEART RATE: 78 BPM | HEIGHT: 61 IN | WEIGHT: 162 LBS

## 2023-12-01 DIAGNOSIS — Z48.89 ENCOUNTER FOR OTHER SPECIFIED SURGICAL AFTERCARE: ICD-10-CM

## 2023-12-01 DIAGNOSIS — Z95.810 PRESENCE OF AUTOMATIC (IMPLANTABLE) CARDIAC DEFIBRILLATOR: ICD-10-CM

## 2023-12-01 DIAGNOSIS — R07.9 CHEST PAIN, UNSPECIFIED: ICD-10-CM

## 2023-12-01 DIAGNOSIS — Z82.49 FAMILY HISTORY OF ISCHEMIC HEART DISEASE AND OTHER DISEASES OF THE CIRCULATORY SYSTEM: ICD-10-CM

## 2023-12-01 DIAGNOSIS — T82.190A OTHER MECHANICAL COMPLICATION OF CARDIAC ELECTRODE, INITIAL ENCOUNTER: ICD-10-CM

## 2023-12-01 DIAGNOSIS — Z87.891 PERSONAL HISTORY OF NICOTINE DEPENDENCE: ICD-10-CM

## 2023-12-01 DIAGNOSIS — Z45.02 ENCOUNTER FOR ADJUSTMENT AND MANAGEMENT OF AUTOMATIC IMPLANTABLE CARDIAC DEFIBRILLATOR: ICD-10-CM

## 2023-12-01 PROCEDURE — 93000 ELECTROCARDIOGRAM COMPLETE: CPT | Mod: 59

## 2023-12-01 PROCEDURE — 99214 OFFICE O/P EST MOD 30 MIN: CPT

## 2023-12-01 PROCEDURE — 71046 X-RAY EXAM CHEST 2 VIEWS: CPT | Mod: 26

## 2023-12-01 PROCEDURE — 93283 PRGRMG EVAL IMPLANTABLE DFB: CPT

## 2023-12-01 PROCEDURE — 99024 POSTOP FOLLOW-UP VISIT: CPT

## 2023-12-01 PROCEDURE — 71046 X-RAY EXAM CHEST 2 VIEWS: CPT

## 2023-12-01 RX ORDER — FUROSEMIDE 40 MG/1
40 TABLET ORAL
Qty: 30 | Refills: 11 | Status: ACTIVE | COMMUNITY

## 2023-12-02 DIAGNOSIS — R07.9 CHEST PAIN, UNSPECIFIED: ICD-10-CM

## 2023-12-03 PROBLEM — Z48.89 ENCOUNTER FOR POSTOPERATIVE WOUND CHECK: Status: ACTIVE | Noted: 2023-12-01

## 2023-12-03 PROBLEM — Z45.02 ENCOUNTER FOR INTERROGATION OF CARDIAC DEFIBRILLATOR: Status: ACTIVE | Noted: 2023-12-03

## 2023-12-03 PROBLEM — Z95.810 ICD (IMPLANTABLE CARDIOVERTER-DEFIBRILLATOR) IN PLACE: Status: RESOLVED | Noted: 2023-12-01 | Resolved: 2023-12-03

## 2023-12-06 ENCOUNTER — APPOINTMENT (OUTPATIENT)
Dept: ELECTROPHYSIOLOGY | Facility: CLINIC | Age: 83
End: 2023-12-06
Payer: MEDICARE

## 2023-12-06 VITALS
WEIGHT: 162 LBS | SYSTOLIC BLOOD PRESSURE: 124 MMHG | DIASTOLIC BLOOD PRESSURE: 80 MMHG | BODY MASS INDEX: 30.58 KG/M2 | HEIGHT: 61 IN | HEART RATE: 100 BPM

## 2023-12-06 PROCEDURE — 93000 ELECTROCARDIOGRAM COMPLETE: CPT | Mod: 59

## 2023-12-06 PROCEDURE — 93290 INTERROG DEV EVAL ICPMS IP: CPT | Mod: 26

## 2023-12-06 PROCEDURE — 99024 POSTOP FOLLOW-UP VISIT: CPT

## 2023-12-06 PROCEDURE — 93283 PRGRMG EVAL IMPLANTABLE DFB: CPT

## 2023-12-22 ENCOUNTER — APPOINTMENT (OUTPATIENT)
Dept: CARDIOLOGY | Facility: CLINIC | Age: 83
End: 2023-12-22

## 2023-12-28 NOTE — HISTORY OF PRESENT ILLNESS
[de-identified] : Ms. Voss is an 83-year-old female with PMHx of chronic HTN, DLD, HFrEF, syncope with cardiogenic etiology s/p DC-ICD (MDT, 23. non-dependent), ?PAF, is here for device assessment.    Feels fine.    She is here today because she was noted to have high RV threshold.   Denies chest pain, shortness of breath, palpitation, dizziness or LOC except noted above.  EKG (12/06/2023): SR   Echo (10/2023): EF 35-40%, mild LAE Cardio: Dr. Taylor

## 2023-12-28 NOTE — ADDENDUM
[FreeTextEntry1] : Lisy RAHMAN assisted in documentation on 12/28/2023 acting as a scribe for Dr. Blanca Lorenzo.

## 2023-12-28 NOTE — ASSESSMENT
[FreeTextEntry1] : ## Non-ischemic Cardiomyopathy   ## Syncope with cardiogenic etiology s/p DC-ICD (MDT, 23. non-dep)    - ICD interrogation shows normally functioning DC-ICD. Battery life ok. Optivol below threshold. No new events. - High RV threshold. However, minimal RV pacing.   - In this scenario, we will continue to observe. Discussed risks versus benefits with the patient. At this moment, this option appears to be reasonable. Will continue to obverse for now.   - No further episodes of syncope. Will continue to observe.    - NSVT treated with Metoprolol Succinate 50 mg.   - Remote monitoring.   - RTC in 6 months.

## 2023-12-28 NOTE — PROCEDURE
[de-identified] : Medtronic [de-identified] : Cobalt [id number] [de-identified] : [id number] [de-identified] : 10/25/2023 [de-identified] : AAIR-DDDR [de-identified] :  bpm [de-identified] : AP: 67.6% // : 0.1% 1 NSVT episode lasting 6 sec (17 beats) with avg. v-rate = 173bpm. Transmitting on [employer].

## 2024-03-01 ENCOUNTER — APPOINTMENT (OUTPATIENT)
Dept: CARDIOLOGY | Facility: CLINIC | Age: 84
End: 2024-03-01

## 2024-04-01 ENCOUNTER — APPOINTMENT (OUTPATIENT)
Dept: CARDIOLOGY | Facility: CLINIC | Age: 84
End: 2024-04-01
Payer: MEDICARE

## 2024-04-01 ENCOUNTER — NON-APPOINTMENT (OUTPATIENT)
Age: 84
End: 2024-04-01

## 2024-04-01 PROCEDURE — 93295 DEV INTERROG REMOTE 1/2/MLT: CPT

## 2024-04-01 PROCEDURE — 93296 REM INTERROG EVL PM/IDS: CPT

## 2024-05-19 ENCOUNTER — RX RENEWAL (OUTPATIENT)
Age: 84
End: 2024-05-19

## 2024-05-19 RX ORDER — ATORVASTATIN CALCIUM 40 MG/1
40 TABLET, FILM COATED ORAL DAILY
Qty: 90 | Refills: 0 | Status: ACTIVE | COMMUNITY
Start: 2024-05-19 | End: 1900-01-01

## 2024-05-31 NOTE — H&P ADULT - VTE RISK ASSESSMENT
Patient advised of insurance approval to proceed with injections and is agreeable to scheduling. Patient scheduled for procedure, pre-procedure instructions reviewed. Patient prefers Local sedation. Reviewed sedation instructions including No Fasting and No  Required. Patient encouraged but not required to hold ASA 81 mg for 24 hours prior to procedure. Patient verbalized understanding of instructions, no further needs at this time.    Pre Procedure Instruction Sheet provided to patient at office visit.       [facility]  PRE-PROCEDURE INSTRUCTIONS WITHOUT SEDATION    Procedure: LESI       Appointment Date: 06/18/2024  Check-In Time: 10:15 AM      Prior to the procedure:  Please update us prior to the procedure if you are experiencing any symptoms of infection such as cough, fever, chills, urinary symptoms, or have recently been prescribed antibiotics, have open wounds, have recently had surgery or dental procedures.    Day of Procedure:  **Drivers will be required for patients who receive prescriptions for Valium.    NO FASTING REQUIRED  Please bring your Insurance Card, Photo ID, List of Current Medications and Referral (if applicable) to your appointment.  Please park in the [facility] [employer] and follow the signs to the [facility].  Check in at [facility] ([address] [place] [address]) outpatient registration in the [facility].  Please note-No prescriptions will be written by Pain Clinic in [place] on the day of procedure. If you require a refill of medications, please contact the office 48 hours prior to your procedure.  If you have an implanted Spinal Cord or Peripheral Nerve Stimulator: Please remember to turn device off for procedure.        Medication Hold:    Number of days you need to be off for the following medications:    Aggrenox 10 days   Agrylin (Anagrelide) 10 days  Brilinta (Ticagrelor) 7 days  Imbruvica (Ibrutinib) 3 days   Enbrel (Etanercept) 24 hours   Fragmin  (Dalteparin) 24 hours   Pletal (Cilostazol) 7 days  Effient (Prasugrel) 7 days  Pradaxa 10 days  Trental 7 days  Eliquis (Apixaban) 3 days  Xarelto (Rivaroxaban) 3 days  Lovenox (Enoxaparin) 24 hours  Aspirin  Greater than 81mg but less than 325mg   5 days  325mg and greater                  7 days  Coumadin       5 days  Procedure may be cancelled if INR is elevated.   Excedrin (with aspirin) 7 days  Plavix (Clopidogrel)                            7 days    NSAIDs: 24 hours preferred      Ibuprofen (Motrin, Advil, Vicoprofen), Naproxen (Naprosyn, Aleve), Piroxcam (Feldene), Meloxicam (Mobic), Oxaprozin (Daypro), Diclofenac (Voltaren), Indomethacin (Indocin), Etodolac (Lodine), Nabumetone (Relafen), Celebrex (Celecoxib)           HERBAL SUPPLEMENTS  5 days preferred  Fish oil, krill oil, Omega-3, Vascepa, Vitamin E, Turmeric, Garlic                       Insurance Authorization:   Most insurances are now requiring a preauthorization for all procedures.  In the event that your insurance does not authorize your procedure within 48 hours of the scheduled date, your procedure will be cancelled and rescheduled to a later date.  Please contact your insurance carrier to determine what your financial responsibility will be for the procedure(s).      Cancellation/Rescheduling Appointment:   In the event you need to cancel or reschedule your appointment, you must notify the office 24 hours prior.    Post-procedure instructions:        Please schedule a follow up visit within 2 to 4 weeks after your last procedure date   Please call our office with any questions or concerns before or after your procedure at  [phone].  If you are a diabetic, please increase the frequency of your glucose monitoring after the procedure as this may cause a temporary increase in your blood sugar.  Contact your primary care physician if your blood sugar rises as you may require some medication adjustment.  It is normal to have increased pain at  injection site for up to 3-5 days after procedure, you can use heat or ice (20 minutes on 20 minutes off) for comfort.    **To hear a recorded version of these instructions, please call [phone] and follow the prompts.  **Para escuchar las instrucciones en Español, por favor de llamar el carlyn [phone] opción 4.     VTE Assessment already completed for this visit

## 2024-06-06 RX ORDER — LOSARTAN POTASSIUM 50 MG/1
50 TABLET, FILM COATED ORAL
Qty: 90 | Refills: 3 | Status: ACTIVE | COMMUNITY
Start: 2023-11-08 | End: 1900-01-01

## 2024-06-06 RX ORDER — METOPROLOL SUCCINATE 50 MG/1
50 TABLET, EXTENDED RELEASE ORAL
Qty: 90 | Refills: 3 | Status: ACTIVE | COMMUNITY
Start: 1900-01-01 | End: 1900-01-01

## 2024-06-12 ENCOUNTER — APPOINTMENT (OUTPATIENT)
Dept: ELECTROPHYSIOLOGY | Facility: CLINIC | Age: 84
End: 2024-06-12
Payer: MEDICARE

## 2024-06-12 VITALS
BODY MASS INDEX: 34.36 KG/M2 | HEART RATE: 93 BPM | DIASTOLIC BLOOD PRESSURE: 80 MMHG | WEIGHT: 182 LBS | SYSTOLIC BLOOD PRESSURE: 127 MMHG | HEIGHT: 61 IN

## 2024-06-12 PROCEDURE — 93290 INTERROG DEV EVAL ICPMS IP: CPT | Mod: 26

## 2024-06-12 PROCEDURE — 99214 OFFICE O/P EST MOD 30 MIN: CPT

## 2024-06-12 PROCEDURE — 93283 PRGRMG EVAL IMPLANTABLE DFB: CPT

## 2024-06-12 PROCEDURE — 93000 ELECTROCARDIOGRAM COMPLETE: CPT | Mod: 59

## 2024-06-12 PROCEDURE — G2211 COMPLEX E/M VISIT ADD ON: CPT

## 2024-06-12 NOTE — HISTORY OF PRESENT ILLNESS
[de-identified] : Ms. Voss is an 83-year-old female with PMHx of chronic HTN, DLD, HFrEF, syncope with cardiogenic etiology s/p DC-ICD (MDT, 23. non-dependent), ?PAF, is here for device assessment.    Feels fine.    She is here today because she was noted to have high RV threshold.   06/12/2024: Feels fine. Denies any complaints.   Denies chest pain, shortness of breath, palpitation, dizziness or LOC except noted above.  EKG (06/12/2024): SR @ 93, , QRS 98, QTc 464  EKG (12/06/2023): SR   Echo (10/2023): EF 35-40%, mild LAE Cardio: Dr. Taylor

## 2024-06-12 NOTE — PROCEDURE
[No] : not [NSR] : normal sinus rhythm [See Device Printout] : See device printout [ICD] : Implantable cardioverter-defibrillator [Voltage: ___ volts] : Voltage was [unfilled] volts [Longevity: ___ months] : The estimated remaining battery life is [unfilled] months [Normal] : The battery status is normal. [Threshold Testing Performed] : Threshold testing was performed [Lead Imp:  ___ohms] : lead impedance was [unfilled] ohms [Sensing Amplitude ___mv] : sensing amplitude was [unfilled] mv [___V @] : [unfilled] V [___ ms] : [unfilled] ms [None] : none [Counters Reset] : the counters were reset [de-identified] : Medtronic [de-identified] : Cobalt [id number] [de-identified] : [id number] [de-identified] : 10/25/2023 [de-identified] : AAIR-DDDR [de-identified] :  bpm [de-identified] : AP: 52.3% // : 0.1% 1 treated VT episode on 1/12/2024 lasting 14 seconds with avg. v-rate = 207bpm. Multiple brief NSVT episodes with avg. v-rate = 180-194bpm. OptiVol fluid WNL. Transmitting on [employer].

## 2024-06-12 NOTE — ADDENDUM
[FreeTextEntry1] : Lisy RAHMAN assisted in documentation on 06/12/2024 acting as a scribe for Dr. Blanca Lorenzo.

## 2024-06-26 ENCOUNTER — RESULT CHARGE (OUTPATIENT)
Age: 84
End: 2024-06-26

## 2024-06-27 ENCOUNTER — APPOINTMENT (OUTPATIENT)
Dept: CARDIOLOGY | Facility: CLINIC | Age: 84
End: 2024-06-27
Payer: MEDICARE

## 2024-06-27 ENCOUNTER — RESULT CHARGE (OUTPATIENT)
Age: 84
End: 2024-06-27

## 2024-06-27 VITALS — HEIGHT: 61 IN | BODY MASS INDEX: 34.36 KG/M2 | HEART RATE: 77 BPM | WEIGHT: 182 LBS

## 2024-06-27 VITALS — DIASTOLIC BLOOD PRESSURE: 64 MMHG | SYSTOLIC BLOOD PRESSURE: 112 MMHG

## 2024-06-27 DIAGNOSIS — I25.10 ATHEROSCLEROTIC HEART DISEASE OF NATIVE CORONARY ARTERY W/OUT ANGINA PECTORIS: ICD-10-CM

## 2024-06-27 DIAGNOSIS — Z86.39 PERSONAL HISTORY OF OTHER ENDOCRINE, NUTRITIONAL AND METABOLIC DISEASE: ICD-10-CM

## 2024-06-27 DIAGNOSIS — I50.20 UNSPECIFIED SYSTOLIC (CONGESTIVE) HEART FAILURE: ICD-10-CM

## 2024-06-27 DIAGNOSIS — I48.0 PAROXYSMAL ATRIAL FIBRILLATION: ICD-10-CM

## 2024-06-27 DIAGNOSIS — I42.8 OTHER CARDIOMYOPATHIES: ICD-10-CM

## 2024-06-27 DIAGNOSIS — I50.22 CHRONIC SYSTOLIC (CONGESTIVE) HEART FAILURE: ICD-10-CM

## 2024-06-27 DIAGNOSIS — I47.29 OTHER VENTRICULAR TACHYCARDIA: ICD-10-CM

## 2024-06-27 PROCEDURE — 99214 OFFICE O/P EST MOD 30 MIN: CPT

## 2024-06-27 PROCEDURE — 93306 TTE W/DOPPLER COMPLETE: CPT

## 2024-06-27 PROCEDURE — G2211 COMPLEX E/M VISIT ADD ON: CPT

## 2024-06-27 PROCEDURE — 93000 ELECTROCARDIOGRAM COMPLETE: CPT

## 2024-06-28 LAB
ALBUMIN SERPL ELPH-MCNC: 4.5 G/DL
ALP BLD-CCNC: 48 U/L
ALT SERPL-CCNC: 13 U/L
ANION GAP SERPL CALC-SCNC: 13 MMOL/L
AST SERPL-CCNC: 17 U/L
BILIRUB SERPL-MCNC: 0.5 MG/DL
BUN SERPL-MCNC: 45 MG/DL
CALCIUM SERPL-MCNC: 9.3 MG/DL
CHLORIDE SERPL-SCNC: 99 MMOL/L
CHOLEST SERPL-MCNC: 192 MG/DL
CO2 SERPL-SCNC: 28 MMOL/L
CREAT SERPL-MCNC: 1.3 MG/DL
EGFR: 40 ML/MIN/1.73M2
ESTIMATED AVERAGE GLUCOSE: 131 MG/DL
GLUCOSE SERPL-MCNC: 97 MG/DL
HBA1C MFR BLD HPLC: 6.2 %
HCT VFR BLD CALC: 40.3 %
HDLC SERPL-MCNC: 62 MG/DL
HGB BLD-MCNC: 12.6 G/DL
LDLC SERPL CALC-MCNC: 100 MG/DL
MCHC RBC-ENTMCNC: 27.6 PG
MCHC RBC-ENTMCNC: 31.3 G/DL
MCV RBC AUTO: 88.2 FL
NONHDLC SERPL-MCNC: 130 MG/DL
NT-PROBNP SERPL-MCNC: 727 PG/ML
PLATELET # BLD AUTO: 236 K/UL
PMV BLD AUTO: 0 /100 WBCS
PMV BLD: 9.5 FL
POTASSIUM SERPL-SCNC: 4.4 MMOL/L
PROT SERPL-MCNC: 7.2 G/DL
RBC # BLD: 4.57 M/UL
RBC # FLD: 14.6 %
SODIUM SERPL-SCNC: 140 MMOL/L
TRIGL SERPL-MCNC: 148 MG/DL
WBC # FLD AUTO: 7.84 K/UL

## 2024-09-10 ENCOUNTER — NON-APPOINTMENT (OUTPATIENT)
Age: 84
End: 2024-09-10

## 2024-09-11 ENCOUNTER — APPOINTMENT (OUTPATIENT)
Dept: CARDIOLOGY | Facility: CLINIC | Age: 84
End: 2024-09-11
Payer: MEDICARE

## 2024-09-11 PROCEDURE — 93296 REM INTERROG EVL PM/IDS: CPT

## 2024-09-11 PROCEDURE — 93295 DEV INTERROG REMOTE 1/2/MLT: CPT

## 2024-11-11 ENCOUNTER — RX RENEWAL (OUTPATIENT)
Age: 84
End: 2024-11-11

## 2024-12-16 ENCOUNTER — APPOINTMENT (OUTPATIENT)
Dept: ELECTROPHYSIOLOGY | Facility: CLINIC | Age: 84
End: 2024-12-16
Payer: MEDICARE

## 2024-12-16 ENCOUNTER — APPOINTMENT (OUTPATIENT)
Dept: CARDIOLOGY | Facility: CLINIC | Age: 84
End: 2024-12-16

## 2024-12-16 VITALS
HEART RATE: 89 BPM | SYSTOLIC BLOOD PRESSURE: 139 MMHG | BODY MASS INDEX: 35.5 KG/M2 | DIASTOLIC BLOOD PRESSURE: 86 MMHG | WEIGHT: 188 LBS | HEIGHT: 61 IN

## 2024-12-16 VITALS
SYSTOLIC BLOOD PRESSURE: 130 MMHG | HEART RATE: 38 BPM | WEIGHT: 188 LBS | HEIGHT: 61 IN | BODY MASS INDEX: 35.5 KG/M2 | DIASTOLIC BLOOD PRESSURE: 90 MMHG

## 2024-12-16 DIAGNOSIS — I47.29 OTHER VENTRICULAR TACHYCARDIA: ICD-10-CM

## 2024-12-16 DIAGNOSIS — I50.23 ACUTE ON CHRONIC SYSTOLIC (CONGESTIVE) HEART FAILURE: ICD-10-CM

## 2024-12-16 DIAGNOSIS — Z86.39 PERSONAL HISTORY OF OTHER ENDOCRINE, NUTRITIONAL AND METABOLIC DISEASE: ICD-10-CM

## 2024-12-16 DIAGNOSIS — I50.22 CHRONIC SYSTOLIC (CONGESTIVE) HEART FAILURE: ICD-10-CM

## 2024-12-16 DIAGNOSIS — I42.8 OTHER CARDIOMYOPATHIES: ICD-10-CM

## 2024-12-16 DIAGNOSIS — I48.0 PAROXYSMAL ATRIAL FIBRILLATION: ICD-10-CM

## 2024-12-16 DIAGNOSIS — I50.20 UNSPECIFIED SYSTOLIC (CONGESTIVE) HEART FAILURE: ICD-10-CM

## 2024-12-16 DIAGNOSIS — I25.10 ATHEROSCLEROTIC HEART DISEASE OF NATIVE CORONARY ARTERY W/OUT ANGINA PECTORIS: ICD-10-CM

## 2024-12-16 DIAGNOSIS — Z45.02 ENCOUNTER FOR ADJUSTMENT AND MANAGEMENT OF AUTOMATIC IMPLANTABLE CARDIAC DEFIBRILLATOR: ICD-10-CM

## 2024-12-16 PROCEDURE — 93283 PRGRMG EVAL IMPLANTABLE DFB: CPT

## 2024-12-16 PROCEDURE — 99215 OFFICE O/P EST HI 40 MIN: CPT

## 2024-12-16 PROCEDURE — 99214 OFFICE O/P EST MOD 30 MIN: CPT

## 2024-12-16 PROCEDURE — 93000 ELECTROCARDIOGRAM COMPLETE: CPT | Mod: 59

## 2024-12-16 PROCEDURE — G2211 COMPLEX E/M VISIT ADD ON: CPT

## 2024-12-17 LAB
ALBUMIN SERPL ELPH-MCNC: 4.3 G/DL
ALP BLD-CCNC: 56 U/L
ALT SERPL-CCNC: 12 U/L
ANION GAP SERPL CALC-SCNC: 12 MMOL/L
AST SERPL-CCNC: 18 U/L
BILIRUB SERPL-MCNC: 0.5 MG/DL
BUN SERPL-MCNC: 20 MG/DL
CALCIUM SERPL-MCNC: 9.2 MG/DL
CHLORIDE SERPL-SCNC: 103 MMOL/L
CHOLEST SERPL-MCNC: 171 MG/DL
CO2 SERPL-SCNC: 23 MMOL/L
CREAT SERPL-MCNC: 0.9 MG/DL
EGFR: 62 ML/MIN/1.73M2
ESTIMATED AVERAGE GLUCOSE: 137 MG/DL
GLUCOSE SERPL-MCNC: 86 MG/DL
HBA1C MFR BLD HPLC: 6.4 %
HCT VFR BLD CALC: 41.7 %
HDLC SERPL-MCNC: 73 MG/DL
HGB BLD-MCNC: 13.1 G/DL
LDLC SERPL CALC-MCNC: 67 MG/DL
MCHC RBC-ENTMCNC: 28.3 PG
MCHC RBC-ENTMCNC: 31.4 G/DL
MCV RBC AUTO: 90.1 FL
NONHDLC SERPL-MCNC: 98 MG/DL
NT-PROBNP SERPL-MCNC: 2093 PG/ML
PLATELET # BLD AUTO: 207 K/UL
PMV BLD AUTO: 0 /100 WBCS
PMV BLD: 9.4 FL
POTASSIUM SERPL-SCNC: 4.2 MMOL/L
PROT SERPL-MCNC: 6.8 G/DL
RBC # BLD: 4.63 M/UL
RBC # FLD: 14.5 %
SODIUM SERPL-SCNC: 138 MMOL/L
TRIGL SERPL-MCNC: 153 MG/DL
WBC # FLD AUTO: 7.62 K/UL

## 2024-12-18 PROBLEM — I50.23 ACUTE ON CHRONIC SYSTOLIC HEART FAILURE: Status: ACTIVE | Noted: 2024-12-18

## 2025-01-06 ENCOUNTER — APPOINTMENT (OUTPATIENT)
Dept: CARDIOLOGY | Facility: CLINIC | Age: 85
End: 2025-01-06

## 2025-01-06 ENCOUNTER — RESULT CHARGE (OUTPATIENT)
Age: 85
End: 2025-01-06

## 2025-01-10 ENCOUNTER — RX RENEWAL (OUTPATIENT)
Age: 85
End: 2025-01-10

## 2025-01-24 ENCOUNTER — NON-APPOINTMENT (OUTPATIENT)
Age: 85
End: 2025-01-24

## 2025-03-12 ENCOUNTER — APPOINTMENT (OUTPATIENT)
Dept: CARDIOLOGY | Facility: CLINIC | Age: 85
End: 2025-03-12
Payer: MEDICARE

## 2025-03-12 ENCOUNTER — NON-APPOINTMENT (OUTPATIENT)
Age: 85
End: 2025-03-12

## 2025-03-12 PROCEDURE — 93296 REM INTERROG EVL PM/IDS: CPT

## 2025-03-12 PROCEDURE — 93295 DEV INTERROG REMOTE 1/2/MLT: CPT

## 2025-05-26 ENCOUNTER — RX RENEWAL (OUTPATIENT)
Age: 85
End: 2025-05-26

## 2025-06-02 NOTE — DISCHARGE NOTE PROVIDER - CARE PROVIDERS DIRECT ADDRESSES
Physician provider: Dr. Mueller  Reason for today's call (Please detail here patients chief complaint): Pt's son wants to know if pt can get labs done the week of July 13 instead of current 7/22 appt. 7/28 OV note states labs should be one week prior to OV, so wants to confirm if they can go the week of 7/13. They want to do a walk in at [facility] if possible.  Last office visit:n/a  Patient Callback Number: [phone]  Was callback number verified?: Yes  Preferred pharmacy (If refill request): n/a  Veriified that patient confirmed no refills left at pharmacy? :No  Calls to office within the last 48 hours?:No    Warm Transfer to (For Red Words): n/a    Patient notified that their information will be routed to the [facility] clinical triage team for review. Patient is advised that they will receive a phone call from the triage department. If symptom related and symptoms worsen before receiving a call back, the patient has been advised to proceed to the nearest ED.        ,DirectAddress_Unknown,DirectAddress_Unknown

## 2025-06-30 ENCOUNTER — NON-APPOINTMENT (OUTPATIENT)
Age: 85
End: 2025-06-30

## 2025-06-30 ENCOUNTER — RESULT CHARGE (OUTPATIENT)
Age: 85
End: 2025-06-30

## 2025-06-30 ENCOUNTER — APPOINTMENT (OUTPATIENT)
Dept: ELECTROPHYSIOLOGY | Facility: CLINIC | Age: 85
End: 2025-06-30
Payer: MEDICARE

## 2025-06-30 ENCOUNTER — APPOINTMENT (OUTPATIENT)
Dept: CARDIOLOGY | Facility: CLINIC | Age: 85
End: 2025-06-30
Payer: MEDICARE

## 2025-06-30 ENCOUNTER — LABORATORY RESULT (OUTPATIENT)
Age: 85
End: 2025-06-30

## 2025-06-30 VITALS
HEIGHT: 61 IN | WEIGHT: 184 LBS | BODY MASS INDEX: 34.74 KG/M2 | SYSTOLIC BLOOD PRESSURE: 130 MMHG | DIASTOLIC BLOOD PRESSURE: 70 MMHG

## 2025-06-30 VITALS
HEART RATE: 90 BPM | SYSTOLIC BLOOD PRESSURE: 104 MMHG | DIASTOLIC BLOOD PRESSURE: 57 MMHG | WEIGHT: 184 LBS | HEIGHT: 61 IN | BODY MASS INDEX: 34.74 KG/M2

## 2025-06-30 PROCEDURE — 99214 OFFICE O/P EST MOD 30 MIN: CPT

## 2025-06-30 PROCEDURE — 93283 PRGRMG EVAL IMPLANTABLE DFB: CPT

## 2025-06-30 PROCEDURE — G2211 COMPLEX E/M VISIT ADD ON: CPT

## 2025-06-30 PROCEDURE — 93000 ELECTROCARDIOGRAM COMPLETE: CPT

## 2025-06-30 RX ORDER — METOPROLOL SUCCINATE 25 MG/1
25 TABLET, EXTENDED RELEASE ORAL
Qty: 90 | Refills: 3 | Status: ACTIVE | COMMUNITY
Start: 2025-06-30 | End: 1900-01-01

## 2025-07-01 LAB
ALBUMIN SERPL ELPH-MCNC: 4.5 G/DL
ALP BLD-CCNC: 51 U/L
ALT SERPL-CCNC: 18 U/L
ANION GAP SERPL CALC-SCNC: 13 MMOL/L
AST SERPL-CCNC: 25 U/L
BILIRUB SERPL-MCNC: 0.8 MG/DL
BUN SERPL-MCNC: 36 MG/DL
CALCIUM SERPL-MCNC: 9.8 MG/DL
CHLORIDE SERPL-SCNC: 97 MMOL/L
CHOLEST SERPL-MCNC: 169 MG/DL
CO2 SERPL-SCNC: 27 MMOL/L
CREAT SERPL-MCNC: 1.5 MG/DL
EGFRCR SERPLBLD CKD-EPI 2021: 34 ML/MIN/1.73M2
ESTIMATED AVERAGE GLUCOSE: 131 MG/DL
GLUCOSE SERPL-MCNC: 85 MG/DL
HBA1C MFR BLD HPLC: 6.2 %
HCT VFR BLD CALC: 41 %
HDLC SERPL-MCNC: 68 MG/DL
HGB BLD-MCNC: 13 G/DL
LDLC SERPL-MCNC: 78 MG/DL
MCHC RBC-ENTMCNC: 29 PG
MCHC RBC-ENTMCNC: 31.7 G/DL
MCV RBC AUTO: 91.3 FL
NONHDLC SERPL-MCNC: 101 MG/DL
NT-PROBNP SERPL-MCNC: 1086 PG/ML
PLATELET # BLD AUTO: 220 K/UL
PMV BLD AUTO: 0 /100 WBCS
PMV BLD: 9.6 FL
POTASSIUM SERPL-SCNC: 4.5 MMOL/L
PROT SERPL-MCNC: 7.3 G/DL
RBC # BLD: 4.49 M/UL
RBC # FLD: 14.9 %
SODIUM SERPL-SCNC: 137 MMOL/L
TRIGL SERPL-MCNC: 133 MG/DL
TSH SERPL-ACNC: 4.52 UIU/ML
WBC # FLD AUTO: 7.73 K/UL

## 2025-07-18 ENCOUNTER — RX RENEWAL (OUTPATIENT)
Age: 85
End: 2025-07-18